# Patient Record
Sex: MALE | Race: WHITE | Employment: FULL TIME | URBAN - METROPOLITAN AREA
[De-identification: names, ages, dates, MRNs, and addresses within clinical notes are randomized per-mention and may not be internally consistent; named-entity substitution may affect disease eponyms.]

---

## 2017-03-17 ENCOUNTER — OFFICE VISIT (OUTPATIENT)
Dept: FAMILY MEDICINE CLINIC | Age: 42
End: 2017-03-17

## 2017-03-17 VITALS
DIASTOLIC BLOOD PRESSURE: 86 MMHG | TEMPERATURE: 98.3 F | WEIGHT: 253 LBS | HEART RATE: 85 BPM | BODY MASS INDEX: 39.63 KG/M2 | SYSTOLIC BLOOD PRESSURE: 124 MMHG | OXYGEN SATURATION: 98 %

## 2017-03-17 DIAGNOSIS — J02.9 SORE THROAT: Primary | ICD-10-CM

## 2017-03-17 LAB — S PYO AG THROAT QL: NORMAL

## 2017-03-17 PROCEDURE — 87880 STREP A ASSAY W/OPTIC: CPT | Performed by: FAMILY MEDICINE

## 2017-03-17 PROCEDURE — 99213 OFFICE O/P EST LOW 20 MIN: CPT | Performed by: FAMILY MEDICINE

## 2017-04-18 ENCOUNTER — TELEPHONE (OUTPATIENT)
Dept: FAMILY MEDICINE CLINIC | Age: 42
End: 2017-04-18

## 2017-04-18 RX ORDER — OMEPRAZOLE 40 MG/1
CAPSULE, DELAYED RELEASE ORAL
Qty: 90 CAPSULE | Refills: 3 | Status: SHIPPED | OUTPATIENT
Start: 2017-04-18 | End: 2017-07-10 | Stop reason: SDUPTHER

## 2017-05-09 ENCOUNTER — OFFICE VISIT (OUTPATIENT)
Dept: SURGERY | Age: 42
End: 2017-05-09

## 2017-05-09 VITALS — HEIGHT: 68 IN | DIASTOLIC BLOOD PRESSURE: 80 MMHG | SYSTOLIC BLOOD PRESSURE: 120 MMHG

## 2017-05-09 DIAGNOSIS — N50.89 SCROTAL SWELLING: Primary | ICD-10-CM

## 2017-05-09 PROCEDURE — 99203 OFFICE O/P NEW LOW 30 MIN: CPT | Performed by: SURGERY

## 2017-05-09 ASSESSMENT — ENCOUNTER SYMPTOMS
EYE ITCHING: 0
ABDOMINAL PAIN: 0
SINUS PRESSURE: 1
BACK PAIN: 1
ABDOMINAL DISTENTION: 0
EYE DISCHARGE: 0
COUGH: 1
COLOR CHANGE: 0

## 2017-05-13 PROBLEM — N45.1 EPIDIDYMITIS WITHOUT ABSCESS: Status: ACTIVE | Noted: 2017-05-13

## 2017-07-10 ENCOUNTER — OFFICE VISIT (OUTPATIENT)
Dept: FAMILY MEDICINE CLINIC | Age: 42
End: 2017-07-10

## 2017-07-10 VITALS
DIASTOLIC BLOOD PRESSURE: 84 MMHG | OXYGEN SATURATION: 97 % | HEART RATE: 72 BPM | WEIGHT: 260 LBS | SYSTOLIC BLOOD PRESSURE: 128 MMHG | BODY MASS INDEX: 39.53 KG/M2

## 2017-07-10 DIAGNOSIS — G40.209 PARTIAL EPILEPSY WITH IMPAIRMENT OF CONSCIOUSNESS (HCC): ICD-10-CM

## 2017-07-10 DIAGNOSIS — K21.9 GASTROESOPHAGEAL REFLUX DISEASE, ESOPHAGITIS PRESENCE NOT SPECIFIED: ICD-10-CM

## 2017-07-10 DIAGNOSIS — M19.072 PRIMARY OSTEOARTHRITIS OF LEFT ANKLE: ICD-10-CM

## 2017-07-10 DIAGNOSIS — I10 ESSENTIAL HYPERTENSION: Primary | ICD-10-CM

## 2017-07-10 PROBLEM — N45.1 EPIDIDYMITIS WITHOUT ABSCESS: Status: RESOLVED | Noted: 2017-05-13 | Resolved: 2017-07-10

## 2017-07-10 PROCEDURE — 99214 OFFICE O/P EST MOD 30 MIN: CPT | Performed by: FAMILY MEDICINE

## 2017-07-10 RX ORDER — OMEPRAZOLE 40 MG/1
CAPSULE, DELAYED RELEASE ORAL
Qty: 90 CAPSULE | Refills: 3 | Status: SHIPPED | OUTPATIENT
Start: 2017-07-10 | End: 2018-06-27 | Stop reason: SDUPTHER

## 2017-07-10 RX ORDER — LISINOPRIL AND HYDROCHLOROTHIAZIDE 20; 12.5 MG/1; MG/1
1 TABLET ORAL DAILY
Qty: 90 TABLET | Refills: 3 | Status: SHIPPED | OUTPATIENT
Start: 2017-07-10 | End: 2018-06-27 | Stop reason: SDUPTHER

## 2017-07-10 RX ORDER — MELOXICAM 15 MG/1
TABLET ORAL
Qty: 90 TABLET | Refills: 3 | Status: SHIPPED | OUTPATIENT
Start: 2017-07-10 | End: 2018-06-27 | Stop reason: SDUPTHER

## 2017-07-10 RX ORDER — LEVETIRACETAM 1000 MG/1
TABLET ORAL
Qty: 180 TABLET | Refills: 3 | Status: SHIPPED | OUTPATIENT
Start: 2017-07-10 | End: 2018-06-27 | Stop reason: SDUPTHER

## 2017-07-10 RX ORDER — AMLODIPINE BESYLATE 5 MG/1
TABLET ORAL
Qty: 90 TABLET | Refills: 3 | Status: SHIPPED | OUTPATIENT
Start: 2017-07-10 | End: 2018-06-27 | Stop reason: SDUPTHER

## 2017-07-10 RX ORDER — ALBUTEROL SULFATE 90 UG/1
2 AEROSOL, METERED RESPIRATORY (INHALATION) EVERY 6 HOURS PRN
Qty: 3 INHALER | Refills: 3 | Status: SHIPPED | OUTPATIENT
Start: 2017-07-10 | End: 2018-07-18 | Stop reason: SDUPTHER

## 2017-08-22 ENCOUNTER — TELEPHONE (OUTPATIENT)
Dept: ORTHOPEDIC SURGERY | Age: 42
End: 2017-08-22

## 2017-08-23 ENCOUNTER — ORTHOTIC/BRACE ENCOUNTER (OUTPATIENT)
Dept: ORTHOPEDIC SURGERY | Age: 42
End: 2017-08-23

## 2017-09-15 ENCOUNTER — OFFICE VISIT (OUTPATIENT)
Dept: ORTHOPEDIC SURGERY | Age: 42
End: 2017-09-15

## 2017-09-15 VITALS
DIASTOLIC BLOOD PRESSURE: 79 MMHG | SYSTOLIC BLOOD PRESSURE: 128 MMHG | WEIGHT: 259.92 LBS | HEIGHT: 68 IN | HEART RATE: 70 BPM | BODY MASS INDEX: 39.39 KG/M2

## 2017-09-15 DIAGNOSIS — M19.072 PRIMARY OSTEOARTHRITIS OF LEFT ANKLE: ICD-10-CM

## 2017-09-15 DIAGNOSIS — M79.671 PAIN IN BOTH FEET: Primary | ICD-10-CM

## 2017-09-15 DIAGNOSIS — M79.672 PAIN IN BOTH FEET: Primary | ICD-10-CM

## 2017-09-15 PROCEDURE — 73630 X-RAY EXAM OF FOOT: CPT | Performed by: ORTHOPAEDIC SURGERY

## 2017-09-15 PROCEDURE — 99243 OFF/OP CNSLTJ NEW/EST LOW 30: CPT | Performed by: ORTHOPAEDIC SURGERY

## 2017-09-20 ENCOUNTER — ORTHOTIC/BRACE ENCOUNTER (OUTPATIENT)
Dept: ORTHOPEDIC SURGERY | Age: 42
End: 2017-09-20

## 2017-09-20 DIAGNOSIS — M76.829 POSTERIOR TIBIALIS MUSCLE DYSFUNCTION: Primary | ICD-10-CM

## 2017-09-20 PROCEDURE — L3020 FOOT LONGITUD/METATARSAL SUP: HCPCS | Performed by: PEDORTHIST

## 2018-03-23 ENCOUNTER — OFFICE VISIT (OUTPATIENT)
Dept: FAMILY MEDICINE CLINIC | Age: 43
End: 2018-03-23

## 2018-03-23 VITALS
WEIGHT: 258.38 LBS | DIASTOLIC BLOOD PRESSURE: 78 MMHG | HEART RATE: 76 BPM | SYSTOLIC BLOOD PRESSURE: 132 MMHG | OXYGEN SATURATION: 98 % | TEMPERATURE: 98.4 F | BODY MASS INDEX: 39.16 KG/M2 | RESPIRATION RATE: 20 BRPM

## 2018-03-23 DIAGNOSIS — J01.90 ACUTE BACTERIAL SINUSITIS: Primary | ICD-10-CM

## 2018-03-23 DIAGNOSIS — B96.89 ACUTE BACTERIAL SINUSITIS: Primary | ICD-10-CM

## 2018-03-23 PROCEDURE — 99213 OFFICE O/P EST LOW 20 MIN: CPT | Performed by: FAMILY MEDICINE

## 2018-03-23 RX ORDER — CEFDINIR 300 MG/1
300 CAPSULE ORAL 2 TIMES DAILY
Qty: 20 CAPSULE | Refills: 0 | Status: SHIPPED | OUTPATIENT
Start: 2018-03-23 | End: 2018-04-02

## 2018-03-23 ASSESSMENT — ENCOUNTER SYMPTOMS
SINUS PRESSURE: 1
HOARSE VOICE: 1
SORE THROAT: 1
COUGH: 1

## 2018-03-23 NOTE — PROGRESS NOTES
Subjective:      Patient ID: Netty Fleischer is a 37 y.o. male. Patient presents for acute medical problem. Medical assistant notes reviewed. Sinusitis   This is a new problem. Episode onset: Tuesday. The problem has been gradually worsening since onset. There has been no fever. His pain is at a severity of 5/10. The pain is moderate. Associated symptoms include congestion, coughing, headaches, a hoarse voice, sinus pressure and a sore throat. (Wheezing ) Treatments tried: tylenol, mucinex. The treatment provided no relief. Patient started having purulent nasal drainage. He's also concerned that he is going out of town next several days. Review of Systems   HENT: Positive for congestion, hoarse voice, sinus pressure and sore throat. Respiratory: Positive for cough. Neurological: Positive for headaches. Allergies   Allergen Reactions    Penicillins     Phenobarbital Sodium        Objective:   Physical Exam   Constitutional: He appears well-developed and well-nourished. He is cooperative. No distress. HENT:   Right Ear: Tympanic membrane and ear canal normal.   Left Ear: Tympanic membrane and ear canal normal.   Nose: Mucosal edema and rhinorrhea (purulent) present. Right sinus exhibits frontal sinus tenderness. Right sinus exhibits no maxillary sinus tenderness. Left sinus exhibits frontal sinus tenderness. Left sinus exhibits no maxillary sinus tenderness. Mouth/Throat: Oropharynx is clear and moist and mucous membranes are normal.   Pulmonary/Chest: Effort normal and breath sounds normal.   Lymphadenopathy:     He has no cervical adenopathy. Neurological: He is alert. Assessment:      Destini Pinto was seen today for sinusitis. Diagnoses and all orders for this visit:    Acute bacterial sinusitis    Other orders  -     cefdinir (OMNICEF) 300 MG capsule; Take 1 capsule by mouth 2 times daily for 10 days            Plan:      Humidification of the bedroom was discussed.   Maintain

## 2018-06-14 ENCOUNTER — HOSPITAL ENCOUNTER (OUTPATIENT)
Dept: SURGERY | Age: 43
Discharge: HOME | End: 2018-06-14
Attending: PODIATRIST | Admitting: PODIATRIST

## 2018-06-14 VITALS
OXYGEN SATURATION: 100 % | BODY MASS INDEX: 39.25 KG/M2 | RESPIRATION RATE: 16 BRPM | DIASTOLIC BLOOD PRESSURE: 68 MMHG | SYSTOLIC BLOOD PRESSURE: 112 MMHG | TEMPERATURE: 98.5 F | HEART RATE: 73 BPM | HEIGHT: 68 IN | WEIGHT: 259 LBS

## 2018-06-14 DIAGNOSIS — M24.571 ANKLE JOINT CONTRACTURE, RIGHT: Primary | ICD-10-CM

## 2018-06-14 LAB
ANION GAP SERPL CALCULATED.3IONS-SCNC: 12 MMOL/L (ref 3–16)
BUN BLDV-MCNC: 19 MG/DL (ref 7–20)
CALCIUM SERPL-MCNC: 9.5 MG/DL (ref 8.3–10.6)
CHLORIDE BLD-SCNC: 101 MMOL/L (ref 99–110)
CO2: 27 MMOL/L (ref 21–32)
CREAT SERPL-MCNC: 0.8 MG/DL (ref 0.9–1.3)
GFR AFRICAN AMERICAN: >60
GFR NON-AFRICAN AMERICAN: >60
GLUCOSE BLD-MCNC: 99 MG/DL (ref 70–99)
HCT VFR BLD CALC: 46.8 % (ref 40.5–52.5)
HEMOGLOBIN: 15.7 G/DL (ref 13.5–17.5)
MCH RBC QN AUTO: 28.4 PG (ref 26–34)
MCHC RBC AUTO-ENTMCNC: 33.6 G/DL (ref 31–36)
MCV RBC AUTO: 84.6 FL (ref 80–100)
PDW BLD-RTO: 13.6 % (ref 12.4–15.4)
PLATELET # BLD: 209 K/UL (ref 135–450)
PMV BLD AUTO: 7.8 FL (ref 5–10.5)
POTASSIUM SERPL-SCNC: 3.9 MMOL/L (ref 3.5–5.1)
RBC # BLD: 5.53 M/UL (ref 4.2–5.9)
SODIUM BLD-SCNC: 140 MMOL/L (ref 136–145)
WBC # BLD: 6.6 K/UL (ref 4–11)

## 2018-06-14 RX ORDER — HYDROCODONE BITARTRATE AND ACETAMINOPHEN 5; 325 MG/1; MG/1
1 TABLET ORAL EVERY 6 HOURS PRN
Status: DISCONTINUED | OUTPATIENT
Start: 2018-06-14 | End: 2018-06-15 | Stop reason: HOSPADM

## 2018-06-14 RX ORDER — LIDOCAINE HYDROCHLORIDE 10 MG/ML
0.5 INJECTION, SOLUTION EPIDURAL; INFILTRATION; INTRACAUDAL; PERINEURAL ONCE
Status: DISCONTINUED | OUTPATIENT
Start: 2018-06-14 | End: 2018-06-15 | Stop reason: HOSPADM

## 2018-06-14 RX ORDER — HYDROCODONE BITARTRATE AND ACETAMINOPHEN 5; 325 MG/1; MG/1
1-2 TABLET ORAL EVERY 6 HOURS PRN
Qty: 30 TABLET | Refills: 0 | Status: SHIPPED | OUTPATIENT
Start: 2018-06-14 | End: 2018-06-17

## 2018-06-14 RX ORDER — SODIUM CHLORIDE, SODIUM LACTATE, POTASSIUM CHLORIDE, CALCIUM CHLORIDE 600; 310; 30; 20 MG/100ML; MG/100ML; MG/100ML; MG/100ML
INJECTION, SOLUTION INTRAVENOUS CONTINUOUS
Status: DISCONTINUED | OUTPATIENT
Start: 2018-06-14 | End: 2018-06-15 | Stop reason: HOSPADM

## 2018-06-14 RX ORDER — MIDAZOLAM HYDROCHLORIDE 1 MG/ML
1 INJECTION INTRAMUSCULAR; INTRAVENOUS ONCE
Status: COMPLETED | OUTPATIENT
Start: 2018-06-14 | End: 2018-06-14

## 2018-06-14 RX ORDER — SCOLOPAMINE TRANSDERMAL SYSTEM 1 MG/1
1 PATCH, EXTENDED RELEASE TRANSDERMAL ONCE
Status: DISCONTINUED | OUTPATIENT
Start: 2018-06-14 | End: 2018-06-15 | Stop reason: HOSPADM

## 2018-06-14 RX ORDER — FENTANYL CITRATE 50 UG/ML
50 INJECTION, SOLUTION INTRAMUSCULAR; INTRAVENOUS ONCE
Status: COMPLETED | OUTPATIENT
Start: 2018-06-14 | End: 2018-06-14

## 2018-06-14 RX ORDER — CEFAZOLIN SODIUM 2 G/100ML
2 INJECTION, SOLUTION INTRAVENOUS
Status: COMPLETED | OUTPATIENT
Start: 2018-06-14 | End: 2018-06-14

## 2018-06-14 RX ADMIN — MIDAZOLAM HYDROCHLORIDE 1 MG: 1 INJECTION INTRAMUSCULAR; INTRAVENOUS at 12:40

## 2018-06-14 RX ADMIN — MIDAZOLAM HYDROCHLORIDE 1 MG: 1 INJECTION INTRAMUSCULAR; INTRAVENOUS at 12:35

## 2018-06-14 RX ADMIN — HYDROCODONE BITARTRATE AND ACETAMINOPHEN 1 TABLET: 5; 325 TABLET ORAL at 14:46

## 2018-06-14 RX ADMIN — CEFAZOLIN SODIUM 2 G: 2 INJECTION, SOLUTION INTRAVENOUS at 13:29

## 2018-06-14 RX ADMIN — SODIUM CHLORIDE, SODIUM LACTATE, POTASSIUM CHLORIDE, CALCIUM CHLORIDE: 600; 310; 30; 20 INJECTION, SOLUTION INTRAVENOUS at 12:32

## 2018-06-14 RX ADMIN — FENTANYL CITRATE 50 MCG: 50 INJECTION, SOLUTION INTRAMUSCULAR; INTRAVENOUS at 12:36

## 2018-06-14 ASSESSMENT — PAIN SCALES - GENERAL
PAINLEVEL_OUTOF10: 4
PAINLEVEL_OUTOF10: 0

## 2018-06-14 ASSESSMENT — PAIN - FUNCTIONAL ASSESSMENT: PAIN_FUNCTIONAL_ASSESSMENT: 0-10

## 2018-06-15 LAB
EKG ATRIAL RATE: 69 BPM
EKG DIAGNOSIS: NORMAL
EKG P AXIS: 3 DEGREES
EKG P-R INTERVAL: 190 MS
EKG Q-T INTERVAL: 408 MS
EKG QRS DURATION: 96 MS
EKG QTC CALCULATION (BAZETT): 437 MS
EKG R AXIS: 23 DEGREES
EKG T AXIS: 16 DEGREES
EKG VENTRICULAR RATE: 69 BPM

## 2018-06-15 PROCEDURE — 93010 ELECTROCARDIOGRAM REPORT: CPT | Performed by: INTERNAL MEDICINE

## 2018-06-27 DIAGNOSIS — G40.209 PARTIAL EPILEPSY WITH IMPAIRMENT OF CONSCIOUSNESS (HCC): ICD-10-CM

## 2018-06-27 RX ORDER — MELOXICAM 15 MG/1
TABLET ORAL
Qty: 90 TABLET | Refills: 0 | Status: SHIPPED | OUTPATIENT
Start: 2018-06-27 | End: 2018-07-18 | Stop reason: SDUPTHER

## 2018-06-27 RX ORDER — LEVETIRACETAM 1000 MG/1
TABLET ORAL
Qty: 180 TABLET | Refills: 0 | Status: SHIPPED | OUTPATIENT
Start: 2018-06-27 | End: 2018-07-18 | Stop reason: SDUPTHER

## 2018-06-27 RX ORDER — AMLODIPINE BESYLATE 5 MG/1
TABLET ORAL
Qty: 90 TABLET | Refills: 0 | Status: SHIPPED | OUTPATIENT
Start: 2018-06-27 | End: 2018-07-18 | Stop reason: SDUPTHER

## 2018-06-27 RX ORDER — LISINOPRIL AND HYDROCHLOROTHIAZIDE 20; 12.5 MG/1; MG/1
1 TABLET ORAL DAILY
Qty: 90 TABLET | Refills: 0 | Status: SHIPPED | OUTPATIENT
Start: 2018-06-27 | End: 2018-07-18

## 2018-06-27 RX ORDER — OMEPRAZOLE 40 MG/1
CAPSULE, DELAYED RELEASE ORAL
Qty: 90 CAPSULE | Refills: 0 | Status: SHIPPED | OUTPATIENT
Start: 2018-06-27 | End: 2018-07-18

## 2018-06-28 ENCOUNTER — TELEPHONE (OUTPATIENT)
Dept: FAMILY MEDICINE CLINIC | Age: 43
End: 2018-06-28

## 2018-07-05 ENCOUNTER — TELEPHONE (OUTPATIENT)
Dept: FAMILY MEDICINE CLINIC | Age: 43
End: 2018-07-05

## 2018-07-05 DIAGNOSIS — G40.209 PARTIAL EPILEPSY WITH IMPAIRMENT OF CONSCIOUSNESS (HCC): ICD-10-CM

## 2018-07-05 RX ORDER — AMLODIPINE BESYLATE 5 MG/1
TABLET ORAL
Qty: 90 TABLET | Refills: 0 | Status: SHIPPED | OUTPATIENT
Start: 2018-07-05 | End: 2018-07-18

## 2018-07-05 RX ORDER — LEVETIRACETAM 1000 MG/1
TABLET ORAL
Qty: 180 TABLET | Refills: 0 | Status: SHIPPED | OUTPATIENT
Start: 2018-07-05 | End: 2018-07-18

## 2018-07-05 RX ORDER — LISINOPRIL AND HYDROCHLOROTHIAZIDE 20; 12.5 MG/1; MG/1
1 TABLET ORAL DAILY
Qty: 90 TABLET | Refills: 0 | Status: SHIPPED | OUTPATIENT
Start: 2018-07-05 | End: 2018-07-18 | Stop reason: SDUPTHER

## 2018-07-05 RX ORDER — MELOXICAM 15 MG/1
TABLET ORAL
Qty: 90 TABLET | Refills: 0 | Status: SHIPPED | OUTPATIENT
Start: 2018-07-05 | End: 2018-07-18

## 2018-07-05 RX ORDER — OMEPRAZOLE 40 MG/1
CAPSULE, DELAYED RELEASE ORAL
Qty: 90 CAPSULE | Refills: 0 | Status: SHIPPED | OUTPATIENT
Start: 2018-07-05 | End: 2018-07-18 | Stop reason: SDUPTHER

## 2018-07-05 NOTE — TELEPHONE ENCOUNTER
Rx's have been cancelled at [facility] pharmacy and sent to [facility].

## 2018-07-05 NOTE — TELEPHONE ENCOUNTER
Pt has an appt scheduled for 09/11. His medications 90 day mail order prescriptions.   He is requesting a refill for the below meds:      levETIRAcetam (KEPPRA) 1000 MG tablet    omeprazole (PRILOSEC) 40 MG delayed release capsule

## 2018-07-17 ENCOUNTER — TELEPHONE (OUTPATIENT)
Dept: NEUROLOGY | Age: 43
End: 2018-07-17

## 2018-07-18 ENCOUNTER — OFFICE VISIT (OUTPATIENT)
Dept: FAMILY MEDICINE CLINIC | Age: 43
End: 2018-07-18

## 2018-07-18 VITALS
OXYGEN SATURATION: 97 % | WEIGHT: 234 LBS | BODY MASS INDEX: 35.58 KG/M2 | HEART RATE: 70 BPM | DIASTOLIC BLOOD PRESSURE: 84 MMHG | SYSTOLIC BLOOD PRESSURE: 120 MMHG

## 2018-07-18 DIAGNOSIS — Z82.49 FAMILY HISTORY OF ISCHEMIC HEART DISEASE: ICD-10-CM

## 2018-07-18 DIAGNOSIS — Q03.9 CONGENITAL HYDROCEPHALUS (HCC): ICD-10-CM

## 2018-07-18 DIAGNOSIS — K21.9 GASTROESOPHAGEAL REFLUX DISEASE, ESOPHAGITIS PRESENCE NOT SPECIFIED: ICD-10-CM

## 2018-07-18 DIAGNOSIS — I10 ESSENTIAL HYPERTENSION: ICD-10-CM

## 2018-07-18 DIAGNOSIS — G40.209 PARTIAL EPILEPSY WITH IMPAIRMENT OF CONSCIOUSNESS (HCC): Primary | ICD-10-CM

## 2018-07-18 PROCEDURE — 90732 PPSV23 VACC 2 YRS+ SUBQ/IM: CPT | Performed by: FAMILY MEDICINE

## 2018-07-18 PROCEDURE — 99214 OFFICE O/P EST MOD 30 MIN: CPT | Performed by: FAMILY MEDICINE

## 2018-07-18 PROCEDURE — 90471 IMMUNIZATION ADMIN: CPT | Performed by: FAMILY MEDICINE

## 2018-07-18 RX ORDER — ALBUTEROL SULFATE 90 UG/1
2 AEROSOL, METERED RESPIRATORY (INHALATION) EVERY 6 HOURS PRN
Qty: 3 INHALER | Refills: 3 | Status: SHIPPED | OUTPATIENT
Start: 2018-07-18 | End: 2019-07-18 | Stop reason: SDUPTHER

## 2018-07-18 RX ORDER — LISINOPRIL AND HYDROCHLOROTHIAZIDE 20; 12.5 MG/1; MG/1
1 TABLET ORAL DAILY
Qty: 90 TABLET | Refills: 3 | Status: SHIPPED | OUTPATIENT
Start: 2018-07-18 | End: 2019-07-18 | Stop reason: SDUPTHER

## 2018-07-18 RX ORDER — OMEPRAZOLE 40 MG/1
CAPSULE, DELAYED RELEASE ORAL
Qty: 90 CAPSULE | Refills: 3 | Status: SHIPPED | OUTPATIENT
Start: 2018-07-18 | End: 2019-07-18 | Stop reason: SDUPTHER

## 2018-07-18 RX ORDER — LEVETIRACETAM 1000 MG/1
TABLET ORAL
Qty: 180 TABLET | Refills: 3 | Status: SHIPPED | OUTPATIENT
Start: 2018-07-18 | End: 2019-07-18 | Stop reason: SDUPTHER

## 2018-07-18 RX ORDER — MELOXICAM 15 MG/1
TABLET ORAL
Qty: 90 TABLET | Refills: 3 | Status: SHIPPED | OUTPATIENT
Start: 2018-07-18 | End: 2019-07-18 | Stop reason: SDUPTHER

## 2018-07-18 RX ORDER — AMLODIPINE BESYLATE 5 MG/1
TABLET ORAL
Qty: 90 TABLET | Refills: 3 | Status: SHIPPED | OUTPATIENT
Start: 2018-07-18 | End: 2019-07-18 | Stop reason: SDUPTHER

## 2018-07-18 ASSESSMENT — PATIENT HEALTH QUESTIONNAIRE - PHQ9
SUM OF ALL RESPONSES TO PHQ9 QUESTIONS 1 & 2: 0
2. FEELING DOWN, DEPRESSED OR HOPELESS: 0
SUM OF ALL RESPONSES TO PHQ QUESTIONS 1-9: 0
1. LITTLE INTEREST OR PLEASURE IN DOING THINGS: 0

## 2018-07-18 ASSESSMENT — ENCOUNTER SYMPTOMS
ABDOMINAL PAIN: 0
HEARTBURN: 0
COUGH: 0
SHORTNESS OF BREATH: 0
CONSTIPATION: 0
BACK PAIN: 0
BLURRED VISION: 0

## 2018-07-18 NOTE — PROGRESS NOTES
3. 9    CREATININE (mg/dL)   Date Value   2018 0.8 (L)        Patient is a family history of heart disease in his father who  in his 35s of a heart attack. His lipids have been normal in the past.  Current Outpatient Prescriptions on File Prior to Visit   Medication Sig Dispense Refill    cetirizine (ZYRTEC) 10 MG tablet Take 10 mg by mouth daily      melatonin 3 MG TABS tablet Take 3 mg by mouth 2 QHS       No current facility-administered medications on file prior to visit. Review of Systems   Constitutional: Negative for malaise/fatigue. HENT: Negative for hearing loss. Eyes: Negative for blurred vision. Respiratory: Negative for cough and shortness of breath. Cardiovascular: Negative for chest pain and leg swelling. Gastrointestinal: Negative for abdominal pain, constipation and heartburn. Musculoskeletal: Positive for joint pain. Negative for back pain and myalgias. Skin: Negative for rash. Neurological: Positive for tingling. Negative for sensory change, focal weakness and weakness. Endo/Heme/Allergies: Negative for polydipsia. Psychiatric/Behavioral: The patient does not have insomnia. Physical Exam   Constitutional: He is oriented to person, place, and time. He appears well-developed and well-nourished. He is cooperative. Obese   HENT:   Nose: No mucosal edema or rhinorrhea. Right sinus exhibits no maxillary sinus tenderness and no frontal sinus tenderness. Left sinus exhibits no maxillary sinus tenderness and no frontal sinus tenderness. Mouth/Throat: Oropharynx is clear and moist and mucous membranes are normal.   Eyes: EOM are normal. Pupils are equal, round, and reactive to light. Neck: Neck supple. Cardiovascular: Normal rate, regular rhythm, normal heart sounds and intact distal pulses. Pulmonary/Chest: Effort normal and breath sounds normal. No respiratory distress.    Musculoskeletal:   Deformity of foot, smaller calf left side

## 2018-07-19 ENCOUNTER — HOSPITAL ENCOUNTER (OUTPATIENT)
Dept: OTHER | Age: 43
Discharge: HOME | End: 2018-07-19
Attending: FAMILY MEDICINE | Admitting: FAMILY MEDICINE

## 2018-07-19 DIAGNOSIS — Z82.49 FAMILY HISTORY OF ISCHEMIC HEART DISEASE: ICD-10-CM

## 2018-07-19 DIAGNOSIS — K21.9 GASTROESOPHAGEAL REFLUX DISEASE, ESOPHAGITIS PRESENCE NOT SPECIFIED: ICD-10-CM

## 2018-07-19 DIAGNOSIS — G40.209 PARTIAL EPILEPSY WITH IMPAIRMENT OF CONSCIOUSNESS (HCC): ICD-10-CM

## 2018-07-19 LAB
ALBUMIN SERPL-MCNC: 4.1 G/DL (ref 3.4–5)
ALP BLD-CCNC: 56 U/L (ref 40–129)
ALT SERPL-CCNC: 22 U/L (ref 10–40)
AST SERPL-CCNC: 13 U/L (ref 15–37)
BILIRUB SERPL-MCNC: 0.8 MG/DL (ref 0–1)
BILIRUBIN DIRECT: <0.2 MG/DL (ref 0–0.3)
BILIRUBIN, INDIRECT: ABNORMAL MG/DL (ref 0–1)
CHOLESTEROL, TOTAL: 104 MG/DL (ref 0–199)
HDLC SERPL-MCNC: 29 MG/DL (ref 40–60)
KEPPRA DOSE AMT: NORMAL
KEPPRA: 36.6 UG/ML (ref 6–46)
LDL CHOLESTEROL CALCULATED: 55 MG/DL
MAGNESIUM: 2 MG/DL (ref 1.8–2.4)
TOTAL PROTEIN: 6.7 G/DL (ref 6.4–8.2)
TRIGL SERPL-MCNC: 101 MG/DL (ref 0–150)
VLDLC SERPL CALC-MCNC: 20 MG/DL

## 2018-08-14 ENCOUNTER — OFFICE VISIT (OUTPATIENT)
Dept: NEUROLOGY | Age: 43
End: 2018-08-14

## 2018-08-14 VITALS
DIASTOLIC BLOOD PRESSURE: 79 MMHG | HEIGHT: 68 IN | BODY MASS INDEX: 39.56 KG/M2 | WEIGHT: 261 LBS | SYSTOLIC BLOOD PRESSURE: 121 MMHG | HEART RATE: 82 BPM

## 2018-08-14 DIAGNOSIS — G40.209 PARTIAL EPILEPSY WITH IMPAIRMENT OF CONSCIOUSNESS (HCC): Primary | ICD-10-CM

## 2018-08-14 DIAGNOSIS — G40.919 BREAKTHROUGH SEIZURE (HCC): ICD-10-CM

## 2018-08-14 PROCEDURE — 99244 OFF/OP CNSLTJ NEW/EST MOD 40: CPT | Performed by: PSYCHIATRY & NEUROLOGY

## 2018-08-14 NOTE — PROGRESS NOTES
NEUROLOGY CONSULTATION     Chief Complaint   Patient presents with    Seizures     Patient is here today because he had a recent seizure after 9 years. HISTORY OF PRESENT ILLNESS :    Kenneth mAado is a 37 y.o. male who is referred by Dr. Julieta Schaffer   History was obtained from patient And his wife. Patient has a known partial complex seizure disorder. Patient also has known congenital hydrocephalus. I had seen this patient a little over 3 years ago. Patient now follow-up with his primary care physician for medication refills. Patient had been doing fairly well on Keppra 1000 mg twice daily. A few weeks ago patient was with some friends and his wife and was having a couple of years. After that he had a spell in which she was startedand became less responsive. This lasted for a minute or 2. After the patient was somewhat foggy and had difficulty with concentrating for the next 2-3 days. There was no tonic-clonic seizure activity noted. There was no bladder or bowel incontinence. There was no tongue or lip biting. Patient had been taking his medications regularly. Since that time patient has had a Keppra level which was therapeutic. Patient states that he had not eaten much that day and for the previous 2 nights he had not slept well maybe not more than a few hours each day. There has been some increased stress at work.       REVIEW OF SYSTEMS    Constitutional:  []   Chills   []  Fatigue   []  Fevers   []  Malaise   []  Weight loss     [x] Denies all of the above    Eyes:  []  Double vision   []  Blurry vision     [x] Denies all of the above    Ears, nose, mouth, throat, and face:   [] Hearing loss    []   Hoarseness      []  Snoring    []  Tinnitus       [x] Denies all of the above     Respiratory:   []  Cough    []  Shortness of breath         [x] Denies all of the above     Cardiovascular:   []  Chest pain    []

## 2018-08-14 NOTE — LETTER
[facility]  [address] [address]  Phone: [phone]  Fax: [phone]    Dylon Del Rosario MD        August 14, 2018       Patient: Sahra Washington   MR Number: [id number]   YOB: 1975   Date of Visit: 8/14/2018       Dear Dr. Abelardo Farris: Thank you for the request for consultation for Ada Chiang to me for the evaluation of Breakthrough seizure. Below are the relevant portions of my assessment and plan of care. NEUROLOGY CONSULTATION     Chief Complaint   Patient presents with    Seizures     Patient is here today because he had a recent seizure after 9 years. HISTORY OF PRESENT ILLNESS :    Ada Chiang is a 37 y.o. male who is referred by Dr. Abelardo Farris   History was obtained from patient And his wife. Patient has a known partial complex seizure disorder. Patient also has known congenital hydrocephalus. I had seen this patient a little over 3 years ago. Patient now follow-up with his primary care physician for medication refills. Patient had been doing fairly well on Keppra 1000 mg twice daily. A few weeks ago patient was with some friends and his wife and was having a couple of years. After that he had a spell in which she was startedand became less responsive. This lasted for a minute or 2. After the patient was somewhat foggy and had difficulty with concentrating for the next 2-3 days. There was no tonic-clonic seizure activity noted. There was no bladder or bowel incontinence. There was no tongue or lip biting. Patient had been taking his medications regularly. Since that time patient has had a Keppra level which was therapeutic. Patient states that he had not eaten much that day and for the previous 2 nights he had not slept well maybe not more than a few hours each day. There has been some increased stress at work.       REVIEW OF SYSTEMS  PONV (postoperative nausea and vomiting)     Seizure ([address]     started as infant, then none x 30 yrs, none since 2009    Seizures ([address] 06/05/2018    LAST SEIZURE     Sinusitis, acute     Upper respiratory infection      Family History   Problem Relation Age of Onset    Rheum Arthritis Mother     High Blood Pressure Mother     Cancer Maternal Grandmother         lung    Other Maternal Grandfather         parkinsons disease    Heart Disease Father      Social History     Social History    Marital status:      Spouse name: N/A    Number of children: N/A    Years of education: N/A     Social History Main Topics    Smoking status: Former Smoker     Quit date: 5/10/1996    Smokeless tobacco: Never Used    Alcohol use 3.0 oz/week     5 Cans of beer per week      Comment: 5 BEERS WEEKLY    Drug use: No    Sexual activity: Not Asked     Other Topics Concern    None     Social History Narrative    None       PHYSICAL EXAMINATION:  /79   Pulse 82   Ht 5' 8\" (1.727 m)   Wt 261 lb (118.4 kg)   BMI 39.68 kg/m²    Appearance: Well appearing, well nourished and in no distress  Mental Status Exam: Patient is alert, oriented to person, place and time. Recent and remote memory is normal  Fund of Knowledge is normal  Attention/concentration is normal.   Speech : No dysarthria  Language : No aphasia  Funduscopic Exam: sharp disc margins  Cranial Nerves:   II: Visual fields:  Full to confrontation  III: Pupils:  equal, round, reactive to light  III,IV,VI: Extra Ocular Movements are intact.  No nystagmus  V: Facial sensation is intact to pin prick and light touch  VII: Facial strength and movements: intact and symmetric smile,cheek puffing and eyebrow elevation  VIII: Hearing:  Intact to finger rub bilaterally  IX: Palate  elevation is symmetric  XI: Shoulder shrug is intact  XII: Tongue movements are normal  Motor:  Muscle tone and bulk are normal.

## 2018-08-14 NOTE — PATIENT INSTRUCTIONS
Please call with any questions or concerns:   [facility]  @ [phone]. LAB RESULTS:  Please obtain any labs or diagnostic tests as discussed today. You should call the office to check the results. Please allow  3 to 7 days for us to get these results. MEDICATION LIST:  Please bring an accurate list of your medications to every visit. APPOINTMENT CONFIRMATION:  We will call you the day before your scheduled appointment to confirm. If we are unable to reach you, you MUST call back by the end of the day to confirm the appointment or we may be forced to cancel.

## 2019-07-18 ENCOUNTER — OFFICE VISIT (OUTPATIENT)
Dept: FAMILY MEDICINE CLINIC | Age: 44
End: 2019-07-18
Payer: COMMERCIAL

## 2019-07-18 VITALS
OXYGEN SATURATION: 96 % | SYSTOLIC BLOOD PRESSURE: 100 MMHG | BODY MASS INDEX: 39.53 KG/M2 | HEART RATE: 68 BPM | DIASTOLIC BLOOD PRESSURE: 80 MMHG | WEIGHT: 260 LBS

## 2019-07-18 DIAGNOSIS — G40.209 PARTIAL EPILEPSY WITH IMPAIRMENT OF CONSCIOUSNESS (HCC): ICD-10-CM

## 2019-07-18 DIAGNOSIS — Z00.00 PREVENTATIVE HEALTH CARE: Primary | ICD-10-CM

## 2019-07-18 DIAGNOSIS — J30.2 SEASONAL ALLERGIC RHINITIS, UNSPECIFIED TRIGGER: ICD-10-CM

## 2019-07-18 DIAGNOSIS — K21.9 GASTROESOPHAGEAL REFLUX DISEASE, ESOPHAGITIS PRESENCE NOT SPECIFIED: ICD-10-CM

## 2019-07-18 DIAGNOSIS — F51.01 PRIMARY INSOMNIA: ICD-10-CM

## 2019-07-18 DIAGNOSIS — I10 ESSENTIAL HYPERTENSION: ICD-10-CM

## 2019-07-18 DIAGNOSIS — M19.072 PRIMARY OSTEOARTHRITIS OF LEFT ANKLE: ICD-10-CM

## 2019-07-18 DIAGNOSIS — Z23 NEED FOR VACCINATION FOR STREP PNEUMONIAE: ICD-10-CM

## 2019-07-18 DIAGNOSIS — J45.909 MILD ASTHMA WITHOUT COMPLICATION, UNSPECIFIED WHETHER PERSISTENT: ICD-10-CM

## 2019-07-18 PROCEDURE — 99396 PREV VISIT EST AGE 40-64: CPT | Performed by: PHYSICIAN ASSISTANT

## 2019-07-18 PROCEDURE — 90670 PCV13 VACCINE IM: CPT | Performed by: PHYSICIAN ASSISTANT

## 2019-07-18 PROCEDURE — 90471 IMMUNIZATION ADMIN: CPT | Performed by: PHYSICIAN ASSISTANT

## 2019-07-18 RX ORDER — AMLODIPINE BESYLATE 5 MG/1
TABLET ORAL
Qty: 90 TABLET | Refills: 3 | Status: SHIPPED | OUTPATIENT
Start: 2019-07-18 | End: 2020-10-26 | Stop reason: SDUPTHER

## 2019-07-18 RX ORDER — TRAZODONE HYDROCHLORIDE 50 MG/1
TABLET ORAL
Qty: 60 TABLET | Refills: 3 | Status: SHIPPED | OUTPATIENT
Start: 2019-07-18 | End: 2019-09-17 | Stop reason: SDUPTHER

## 2019-07-18 RX ORDER — MELOXICAM 15 MG/1
TABLET ORAL
Qty: 90 TABLET | Refills: 3 | Status: SHIPPED | OUTPATIENT
Start: 2019-07-18 | End: 2020-10-26 | Stop reason: SDUPTHER

## 2019-07-18 RX ORDER — LISINOPRIL AND HYDROCHLOROTHIAZIDE 20; 12.5 MG/1; MG/1
1 TABLET ORAL DAILY
Qty: 90 TABLET | Refills: 3 | Status: SHIPPED | OUTPATIENT
Start: 2019-07-18 | End: 2020-06-04

## 2019-07-18 RX ORDER — LEVETIRACETAM 1000 MG/1
TABLET ORAL
Qty: 180 TABLET | Refills: 3 | Status: SHIPPED | OUTPATIENT
Start: 2019-07-18 | End: 2020-10-26 | Stop reason: SDUPTHER

## 2019-07-18 RX ORDER — OMEPRAZOLE 40 MG/1
CAPSULE, DELAYED RELEASE ORAL
Qty: 90 CAPSULE | Refills: 3 | Status: SHIPPED | OUTPATIENT
Start: 2019-07-18 | End: 2020-10-26 | Stop reason: SDUPTHER

## 2019-07-18 RX ORDER — ALBUTEROL SULFATE 90 UG/1
2 AEROSOL, METERED RESPIRATORY (INHALATION) EVERY 6 HOURS PRN
Qty: 3 INHALER | Refills: 0 | Status: SHIPPED | OUTPATIENT
Start: 2019-07-18 | End: 2019-09-29 | Stop reason: SDUPTHER

## 2019-07-18 ASSESSMENT — ENCOUNTER SYMPTOMS
CHEST TIGHTNESS: 0
CONSTIPATION: 0
TROUBLE SWALLOWING: 0
BACK PAIN: 0
DIARRHEA: 0
SORE THROAT: 0
EYE PAIN: 0
COUGH: 0
VOICE CHANGE: 0
SHORTNESS OF BREATH: 0
ABDOMINAL PAIN: 0

## 2019-07-18 ASSESSMENT — PATIENT HEALTH QUESTIONNAIRE - PHQ9
SUM OF ALL RESPONSES TO PHQ QUESTIONS 1-9: 0
1. LITTLE INTEREST OR PLEASURE IN DOING THINGS: 0
SUM OF ALL RESPONSES TO PHQ9 QUESTIONS 1 & 2: 0
SUM OF ALL RESPONSES TO PHQ QUESTIONS 1-9: 0
2. FEELING DOWN, DEPRESSED OR HOPELESS: 0

## 2019-07-18 NOTE — PATIENT INSTRUCTIONS
Mabel Calzada was seen today for follow-up. 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)  -     levETIRAcetam (KEPPRA) 1000 MG tablet; TAKE 1 TABLET BY MOUTH 2 TIMES DAILY    Mild asthma without complication, unspecified whether persistent  -     albuterol sulfate HFA (VENTOLIN HFA) 108 (90 Base) MCG/ACT inhaler; Inhale 2 puffs into the lungs every 6 hours as needed for Wheezing    Essential hypertension  -     lisinopril-hydrochlorothiazide (PRINZIDE;ZESTORETIC) 20-12.5 MG per tablet; Take 1 tablet by mouth daily  -     amLODIPine (NORVASC) 5 MG tablet; TAKE 1 TABLET BY MOUTH DAILY    Seasonal allergic rhinitis, unspecified trigger    Primary osteoarthritis of left ankle  -     meloxicam (MOBIC) 15 MG tablet; TAKE 1 TABLET BY MOUTH DAILY    Primary insomnia  -     traZODone (DESYREL) 50 MG tablet; Take 1-2 tabs po qhs prn insomnia    Need for vaccination for Strep pneumoniae  -     PREVNAR 13 IM (Pneumococcal conjugate vaccine 13-valent)       Trazodone for sleep, prevnar 13 today, discussed better eating habits and more exercise, return in a year.

## 2019-07-18 NOTE — PROGRESS NOTES
Donaldo Ricci was seen today for follow-up. 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)  -     levETIRAcetam (KEPPRA) 1000 MG tablet; TAKE 1 TABLET BY MOUTH 2 TIMES DAILY    Mild asthma without complication, unspecified whether persistent  -     albuterol sulfate HFA (VENTOLIN HFA) 108 (90 Base) MCG/ACT inhaler; Inhale 2 puffs into the lungs every 6 hours as needed for Wheezing    Essential hypertension  -     lisinopril-hydrochlorothiazide (PRINZIDE;ZESTORETIC) 20-12.5 MG per tablet; Take 1 tablet by mouth daily  -     amLODIPine (NORVASC) 5 MG tablet; TAKE 1 TABLET BY MOUTH DAILY    Seasonal allergic rhinitis, unspecified trigger    Primary osteoarthritis of left ankle  -     meloxicam (MOBIC) 15 MG tablet; TAKE 1 TABLET BY MOUTH DAILY    Primary insomnia  -     traZODone (DESYREL) 50 MG tablet; Take 1-2 tabs po qhs prn insomnia    Need for vaccination for Strep pneumoniae  -     PREVNAR 13 IM (Pneumococcal conjugate vaccine 13-valent)             Plan:      Prevnar 13 today, try trazodone for sleep, refills given, reviewed recent labs that were normal, return in a year.          Ness Rao

## 2019-09-17 DIAGNOSIS — F51.01 PRIMARY INSOMNIA: ICD-10-CM

## 2019-09-17 RX ORDER — TRAZODONE HYDROCHLORIDE 50 MG/1
TABLET ORAL
Qty: 180 TABLET | Refills: 1 | Status: SHIPPED | OUTPATIENT
Start: 2019-09-17 | End: 2020-10-26 | Stop reason: SDUPTHER

## 2019-12-27 DIAGNOSIS — J45.909 MILD ASTHMA WITHOUT COMPLICATION, UNSPECIFIED WHETHER PERSISTENT: ICD-10-CM

## 2019-12-27 RX ORDER — ALBUTEROL SULFATE 90 UG/1
AEROSOL, METERED RESPIRATORY (INHALATION)
Qty: 3 INHALER | Refills: 0 | Status: SHIPPED | OUTPATIENT
Start: 2019-12-27 | End: 2020-10-26 | Stop reason: SDUPTHER

## 2020-03-26 RX ORDER — ALBUTEROL SULFATE 90 UG/1
AEROSOL, METERED RESPIRATORY (INHALATION)
Qty: 24 G | Refills: 3 | OUTPATIENT
Start: 2020-03-26

## 2020-03-27 RX ORDER — ALBUTEROL SULFATE 90 UG/1
AEROSOL, METERED RESPIRATORY (INHALATION)
Qty: 24 G | Refills: 3 | OUTPATIENT
Start: 2020-03-27

## 2020-06-04 RX ORDER — LISINOPRIL AND HYDROCHLOROTHIAZIDE 20; 12.5 MG/1; MG/1
TABLET ORAL
Qty: 90 TABLET | Refills: 0 | Status: SHIPPED | OUTPATIENT
Start: 2020-06-04 | End: 2020-10-26 | Stop reason: SDUPTHER

## 2020-06-18 RX ORDER — ALBUTEROL SULFATE 90 UG/1
AEROSOL, METERED RESPIRATORY (INHALATION)
Qty: 24 G | Refills: 0 | OUTPATIENT
Start: 2020-06-18

## 2020-07-30 ENCOUNTER — TELEPHONE (OUTPATIENT)
Dept: FAMILY MEDICINE CLINIC | Age: 45
End: 2020-07-30

## 2020-07-30 NOTE — TELEPHONE ENCOUNTER
Gurugela Cy sent to [facility] Clinical Staff               Subject: Message to Provider     QUESTIONS   Information for Provider? pt requesting np apt for her in her    Joanne Tinoco  [birth year]. has Elizabeth Schulte they really would like to switch to   dr Manoj Mitchell. please f/u asap   ---------------------------------------------------------------------------   --------------   CALL BACK INFO   What is the best way for the office to contact you? OK to leave message on   voicemail   Preferred Call Back Phone Number? [phone]   ---------------------------------------------------------------------------   --------------   SCRIPT ANSWERS   Relationship to Patient?  Self

## 2020-10-26 ENCOUNTER — OFFICE VISIT (OUTPATIENT)
Dept: FAMILY MEDICINE CLINIC | Age: 45
End: 2020-10-26
Payer: COMMERCIAL

## 2020-10-26 VITALS
TEMPERATURE: 97.2 F | BODY MASS INDEX: 42.41 KG/M2 | DIASTOLIC BLOOD PRESSURE: 80 MMHG | HEIGHT: 68 IN | OXYGEN SATURATION: 98 % | HEART RATE: 68 BPM | SYSTOLIC BLOOD PRESSURE: 116 MMHG | WEIGHT: 279.8 LBS

## 2020-10-26 PROCEDURE — 99396 PREV VISIT EST AGE 40-64: CPT | Performed by: FAMILY MEDICINE

## 2020-10-26 RX ORDER — MELOXICAM 15 MG/1
TABLET ORAL
Qty: 90 TABLET | Refills: 1 | Status: SHIPPED | OUTPATIENT
Start: 2020-10-26 | End: 2021-05-04 | Stop reason: SDUPTHER

## 2020-10-26 RX ORDER — OMEPRAZOLE 40 MG/1
CAPSULE, DELAYED RELEASE ORAL
Qty: 90 CAPSULE | Refills: 1 | Status: SHIPPED | OUTPATIENT
Start: 2020-10-26 | End: 2021-05-04 | Stop reason: SDUPTHER

## 2020-10-26 RX ORDER — ALBUTEROL SULFATE 90 UG/1
AEROSOL, METERED RESPIRATORY (INHALATION)
Qty: 3 INHALER | Refills: 1 | Status: SHIPPED | OUTPATIENT
Start: 2020-10-26 | End: 2021-05-03

## 2020-10-26 RX ORDER — AMLODIPINE BESYLATE 5 MG/1
TABLET ORAL
Qty: 90 TABLET | Refills: 1 | Status: SHIPPED | OUTPATIENT
Start: 2020-10-26 | End: 2021-05-04 | Stop reason: SDUPTHER

## 2020-10-26 RX ORDER — TRAZODONE HYDROCHLORIDE 50 MG/1
TABLET ORAL
Qty: 180 TABLET | Refills: 1 | Status: SHIPPED | OUTPATIENT
Start: 2020-10-26 | End: 2021-05-04 | Stop reason: SDUPTHER

## 2020-10-26 RX ORDER — LEVETIRACETAM 1000 MG/1
TABLET ORAL
Qty: 180 TABLET | Refills: 1 | Status: SHIPPED | OUTPATIENT
Start: 2020-10-26 | End: 2021-05-04 | Stop reason: SDUPTHER

## 2020-10-26 RX ORDER — LISINOPRIL AND HYDROCHLOROTHIAZIDE 20; 12.5 MG/1; MG/1
TABLET ORAL
Qty: 90 TABLET | Refills: 1 | Status: SHIPPED | OUTPATIENT
Start: 2020-10-26 | End: 2021-05-04 | Stop reason: SDUPTHER

## 2020-10-26 ASSESSMENT — PATIENT HEALTH QUESTIONNAIRE - PHQ9
SUM OF ALL RESPONSES TO PHQ QUESTIONS 1-9: 0
SUM OF ALL RESPONSES TO PHQ9 QUESTIONS 1 & 2: 0
SUM OF ALL RESPONSES TO PHQ QUESTIONS 1-9: 0
1. LITTLE INTEREST OR PLEASURE IN DOING THINGS: 0
SUM OF ALL RESPONSES TO PHQ QUESTIONS 1-9: 0
2. FEELING DOWN, DEPRESSED OR HOPELESS: 0

## 2020-10-26 NOTE — PROGRESS NOTES
Ivanna Lei   YOB: 1975    Date of Visit:  10/26/2020        Allergies   Allergen Reactions    Penicillins Rash    Phenobarbital Sodium Diarrhea and Nausea And Vomiting     Outpatient Medications Marked as Taking for the 10/26/20 encounter (Office Visit) with Tessa Jones MD   Medication Sig Dispense Refill    albuterol sulfate  (90 Base) MCG/ACT inhaler INHALE 2 PUFFS ORALLY EVERY SIX HOURS AS NEEDED FOR WHEEZING 3 Inhaler 1    amLODIPine (NORVASC) 5 MG tablet TAKE 1 TABLET BY MOUTH DAILY 90 tablet 1    levETIRAcetam (KEPPRA) 1000 MG tablet TAKE 1 TABLET BY MOUTH 2 TIMES DAILY 180 tablet 1    lisinopril-hydroCHLOROthiazide (PRINZIDE;ZESTORETIC) 20-12.5 MG per tablet TAKE 1 TABLET BY MOUTH EVERY DAY 90 tablet 1    meloxicam (MOBIC) 15 MG tablet TAKE 1 TABLET BY MOUTH DAILY 90 tablet 1    omeprazole (PRILOSEC) 40 MG delayed release capsule TAKE 1 CAPSULE BY MOUTH DAILY 90 capsule 1    traZODone (DESYREL) 50 MG tablet Take 1-2 tabs po qhs prn insomnia 180 tablet 1    cetirizine (ZYRTEC) 10 MG tablet Take 10 mg by mouth daily      melatonin 3 MG TABS tablet Take 6 mg by mouth nightly as needed            Vitals:    10/26/20 1304   BP: 116/80   Site: Left Upper Arm   Position: Sitting   Cuff Size: Medium Adult   Pulse: 68   Temp: 97.2 °F (36.2 °C)   TempSrc: Temporal   SpO2: 98%   Weight: 279 lb 12.8 oz (126.9 kg)   Height: 5' 8\" (1.727 m)     Body mass index is 42.54 kg/m². Wt Readings from Last 3 Encounters:   10/26/20 279 lb 12.8 oz (126.9 kg)   07/18/19 260 lb (117.9 kg)   08/14/18 261 lb (118.4 kg)     BP Readings from Last 3 Encounters:   10/26/20 116/80   07/18/19 100/80   08/14/18 121/79        Chief Complaint   Patient presents with   Beba Sierra [facility] Patient     [facility] care ; medication mgmt       HPI    Federico Daughters presents to establish care. he has the following concerns today:    Seizure disorder:  Stable on his current medications since 2009. His last seizure was July 2018. Last saw a neurologist July 2018 - Dr. Deacon Douglas - he was cleared by neurology and advised to f/u with PCP. Thought 2/2 to shunt. Congenital hydrocephalus:  He has a  shunt since age 3. His was diagnosed at age 3. Was having balance and speech issues. Has no residual issues. Was told he would never need to have shunt replaced unless became symptomatic. Has yearly eye exams to measure pressure behind his eyes. Having no coordination or speech issues. Asthma: Rarely needs albuterol. Hypertension: Stable on current medication regimen. GERD: On omeprazole daily. Insomnia: on trazondone. Drop foot:  L side. Has a brace he has worn the last 10 years. Has chronic pain. Takes mobic daily. Hx of urethral stent:  S/p surgery x2 - last 2005. Dx age 13. 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 Present since age 6. SH:  Lives with wife Lashawn Flores who is a patient here). Has 1 child- 33 yo step- son. .      Past Medical History:   Diagnosis Date    Asthma     SEASONAL    Bronchitis, acute     Disorder of skin and subcutaneous tissue     Hydrocephalus, congenital (HCC)     Hypertension     Need for prophylactic vaccination against Streptococcus pneumoniae (pneumococcus)     Need for prophylactic vaccination and inoculation against influenza     Need for prophylactic vaccination or inoculation against diphtheria and tetanus     Osteoarthrosis, unspecified whether generalized or localized, unspecified site     foot and ankle    Pharyngitis     PONV (postoperative nausea and vomiting)     Seizure ([address]     started as infant, then none x 30 yrs, none since 2009    Seizures ([address] 06/05/2018    LAST SEIZURE     Sinusitis, acute     Upper respiratory infection        Past Surgical History:   Procedure Laterality Date    FOOT SURGERY Right     gastrocnemius recession    HYDROCELE EXCISION Right 06/2017    OTHER SURGICAL HISTORY      URETHROPLASTY X 2    URETHRAL STRICTURE DILATATION      x2    VENTRICULOPERITONEAL SHUNT         Social History     Socioeconomic History    Marital status:      Spouse name: Not on file    Number of children: Not on file    Years of education: Not on file    Highest education level: Not on file   Occupational History    Not on file   Social Needs    Financial resource strain: Not on file    Food insecurity     Worry: Not on file     Inability: Not on file    Transportation needs     Medical: Not on file     Non-medical: Not on file   Tobacco Use    Smoking status: Former Smoker     Last attempt to quit: 5/10/1996     Years since quittin.4    Smokeless tobacco: Never Used   Substance and Sexual Activity    Alcohol use: Yes     Alcohol/week: 5.0 standard drinks     Types: 5 Cans of beer per week     Comment: 5 BEERS WEEKLY    Drug use: No    Sexual activity: Not on file   Lifestyle    Physical activity     Days per week: Not on file     Minutes per session: Not on file    Stress: Not on file   Relationships    Social connections     Talks on phone: Not on file     Gets together: Not on file     Attends Islam service: Not on file     Active member of club or organization: Not on file     Attends meetings of clubs or organizations: Not on file     Relationship status: Not on file    Intimate partner violence     Fear of current or ex partner: Not on file     Emotionally abused: Not on file     Physically abused: Not on file     Forced sexual activity: Not on file   Other Topics Concern    Not on file   Social History Narrative    Not on file       Family History   Problem Relation Age of Onset    Rheum Arthritis Mother     High Blood Pressure Mother     Cancer Maternal Grandmother         lung    Other Maternal Grandfather         parkinsons disease    Heart Disease Father          Review of Systems  Complete review of systems negative except as documented in the HPI.     Physical Exam  Constitutional:       General: He is not in acute distress. Appearance: He is well-developed. HENT:      Head: Normocephalic and atraumatic. Right Ear: Tympanic membrane, ear canal and external ear normal. There is no impacted cerumen. Tympanic membrane is not injected or bulging. Left Ear: Tympanic membrane, ear canal and external ear normal. There is no impacted cerumen. Tympanic membrane is not injected or bulging. Eyes:      General: Lids are normal.         Right eye: No discharge. Left eye: No discharge. Extraocular Movements: Extraocular movements intact. Conjunctiva/sclera: Conjunctivae normal.      Pupils: Pupils are equal, round, and reactive to light. Neck:      Musculoskeletal: Normal range of motion and neck supple. Thyroid: No thyromegaly. Trachea: No tracheal deviation. Cardiovascular:      Rate and Rhythm: Normal rate and regular rhythm. Heart sounds: Normal heart sounds. No murmur. Pulmonary:      Effort: Pulmonary effort is normal. No respiratory distress. Breath sounds: Normal breath sounds. Abdominal:      General: Bowel sounds are normal. There is no distension. Palpations: Abdomen is soft. Tenderness: There is no abdominal tenderness. Musculoskeletal:         General: No swelling. Comments: Normal gait, normal muscle tone   Lymphadenopathy:      Cervical: No cervical adenopathy. Skin:     General: Skin is warm and dry. Findings: No rash. Neurological:      General: No focal deficit present. Mental Status: He is alert and oriented to person, place, and time. Mental status is at baseline. Cranial Nerves: No cranial nerve deficit. Psychiatric:         Mood and Affect: Mood normal.         Behavior: Behavior normal.         Thought Content: Thought content normal.         Judgment: Judgment normal.           Assessment/Plan     1.  Mild asthma without complication, unspecified whether persistent  Stable. Continue current regimen. - albuterol sulfate  (90 Base) MCG/ACT inhaler; INHALE 2 PUFFS ORALLY EVERY SIX HOURS AS NEEDED FOR WHEEZING  Dispense: 3 Inhaler; Refill: 0    2. Essential hypertension  Stable. Continue current regimen. - amLODIPine (NORVASC) 5 MG tablet; TAKE 1 TABLET BY MOUTH DAILY  Dispense: 90 tablet; Refill: 3  - lisinopril-hydroCHLOROthiazide (PRINZIDE;ZESTORETIC) 20-12.5 MG per tablet; TAKE 1 TABLET BY MOUTH EVERY DAY  Dispense: 90 tablet; Refill: 3    3. 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 Continue current regimen. Discussed seeing neurology again. Patient prefers to continue fu here as stable. If develops any new symptoms will refer back the neuro. - levETIRAcetam (KEPPRA) 1000 MG tablet; TAKE 1 TABLET BY MOUTH 2 TIMES DAILY  Dispense: 180 tablet; Refill: 3    4. Primary osteoarthritis of left ankle  Stable. Continue current regimen. - meloxicam (MOBIC) 15 MG tablet; TAKE 1 TABLET BY MOUTH DAILY  Dispense: 90 tablet; Refill: 3    5. Gastroesophageal reflux disease  Stable. Continue current regimen. - omeprazole (PRILOSEC) 40 MG delayed release capsule; TAKE 1 CAPSULE BY MOUTH DAILY  Dispense: 90 capsule; Refill: 3    6. Primary insomnia  Stable. Continue current regimen. - traZODone (DESYREL) 50 MG tablet; Take 1-2 tabs po qhs prn insomnia  Dispense: 180 tablet; Refill: 1    7. Congenital hydrocephalus (HCC)  Stable. Continue current regimen. 8. Seasonal allergic rhinitis, unspecified trigger  Stable. Continue current regimen. 9. Class 3 severe obesity due to excess calories with serious comorbidity and body mass index (BMI) of 40.0 to 44.9 in adult [facility])  Increased. Counseled on lifestyle modifications including diet and exercise. 10. Congenital stricture of urethra  Stable. Continue current regimen. Resolved. [address] maintenance  Annual physical exam done today.  Counseled on preventative care and a healthy lifestlye. Immunization history reviewed. - Lipid Panel; Future  - HIV Screen; Future  - Comprehensive Metabolic Panel; Future        Discussed medications with patient, who voiced understanding of their use and indications. All questions answered. Return in about 6 months (around 4/26/2021) for Chronic conditions.

## 2020-10-30 ENCOUNTER — HOSPITAL ENCOUNTER (OUTPATIENT)
Age: 45
Discharge: HOME | End: 2020-10-30
Payer: COMMERCIAL

## 2020-10-30 LAB
A/G RATIO: 1.3 (ref 1.1–2.2)
ALBUMIN SERPL-MCNC: 4 G/DL (ref 3.4–5)
ALP BLD-CCNC: 63 U/L (ref 40–129)
ALT SERPL-CCNC: 27 U/L (ref 10–40)
ANION GAP SERPL CALCULATED.3IONS-SCNC: 12 MMOL/L (ref 3–16)
AST SERPL-CCNC: 16 U/L (ref 15–37)
BILIRUB SERPL-MCNC: 0.8 MG/DL (ref 0–1)
BUN BLDV-MCNC: 15 MG/DL (ref 7–20)
CALCIUM SERPL-MCNC: 8.8 MG/DL (ref 8.3–10.6)
CHLORIDE BLD-SCNC: 98 MMOL/L (ref 99–110)
CHOLESTEROL, TOTAL: 123 MG/DL (ref 0–199)
CO2: 25 MMOL/L (ref 21–32)
CREAT SERPL-MCNC: 1 MG/DL (ref 0.9–1.3)
GFR AFRICAN AMERICAN: >60
GFR NON-AFRICAN AMERICAN: >60
GLOBULIN: 3 G/DL
GLUCOSE BLD-MCNC: 93 MG/DL (ref 70–99)
HDLC SERPL-MCNC: 31 MG/DL (ref 40–60)
HIV AG/AB: NORMAL
HIV ANTIGEN: NORMAL
HIV-1 ANTIBODY: NORMAL
HIV-2 AB: NORMAL
KEPPRA DOSE AMT: NORMAL
KEPPRA: 28.3 UG/ML (ref 6–46)
LDL CHOLESTEROL CALCULATED: 69 MG/DL
POTASSIUM SERPL-SCNC: 4 MMOL/L (ref 3.5–5.1)
SODIUM BLD-SCNC: 135 MMOL/L (ref 136–145)
TOTAL PROTEIN: 7 G/DL (ref 6.4–8.2)
TRIGL SERPL-MCNC: 113 MG/DL (ref 0–150)
VLDLC SERPL CALC-MCNC: 23 MG/DL

## 2020-10-30 PROCEDURE — 80177 DRUG SCRN QUAN LEVETIRACETAM: CPT

## 2020-10-30 PROCEDURE — 80053 COMPREHEN METABOLIC PANEL: CPT

## 2020-10-30 PROCEDURE — 86701 HIV-1ANTIBODY: CPT

## 2020-10-30 PROCEDURE — 36415 COLL VENOUS BLD VENIPUNCTURE: CPT

## 2020-10-30 PROCEDURE — 87390 HIV-1 AG IA: CPT

## 2020-10-30 PROCEDURE — 80061 LIPID PANEL: CPT

## 2020-10-30 PROCEDURE — 86702 HIV-2 ANTIBODY: CPT

## 2021-04-26 ENCOUNTER — OFFICE VISIT (OUTPATIENT)
Dept: FAMILY MEDICINE CLINIC | Age: 46
End: 2021-04-26
Payer: COMMERCIAL

## 2021-04-26 VITALS
HEART RATE: 64 BPM | WEIGHT: 271 LBS | SYSTOLIC BLOOD PRESSURE: 118 MMHG | BODY MASS INDEX: 41.07 KG/M2 | OXYGEN SATURATION: 99 % | HEIGHT: 68 IN | DIASTOLIC BLOOD PRESSURE: 82 MMHG

## 2021-04-26 DIAGNOSIS — I10 ESSENTIAL HYPERTENSION: Primary | ICD-10-CM

## 2021-04-26 DIAGNOSIS — G40.209 PARTIAL EPILEPSY WITH IMPAIRMENT OF CONSCIOUSNESS (HCC): ICD-10-CM

## 2021-04-26 DIAGNOSIS — E66.01 CLASS 3 SEVERE OBESITY DUE TO EXCESS CALORIES WITH SERIOUS COMORBIDITY AND BODY MASS INDEX (BMI) OF 40.0 TO 44.9 IN ADULT (HCC): ICD-10-CM

## 2021-04-26 DIAGNOSIS — J45.909 MILD ASTHMA WITHOUT COMPLICATION, UNSPECIFIED WHETHER PERSISTENT: ICD-10-CM

## 2021-04-26 DIAGNOSIS — Z00.00 HEALTHCARE MAINTENANCE: ICD-10-CM

## 2021-04-26 DIAGNOSIS — Q03.9 CONGENITAL HYDROCEPHALUS (HCC): ICD-10-CM

## 2021-04-26 PROCEDURE — 99214 OFFICE O/P EST MOD 30 MIN: CPT | Performed by: FAMILY MEDICINE

## 2021-04-26 SDOH — ECONOMIC STABILITY: INCOME INSECURITY: HOW HARD IS IT FOR YOU TO PAY FOR THE VERY BASICS LIKE FOOD, HOUSING, MEDICAL CARE, AND HEATING?: NOT HARD AT ALL

## 2021-04-26 SDOH — ECONOMIC STABILITY: TRANSPORTATION INSECURITY
IN THE PAST 12 MONTHS, HAS LACK OF TRANSPORTATION KEPT YOU FROM MEETINGS, WORK, OR FROM GETTING THINGS NEEDED FOR DAILY LIVING?: NO

## 2021-04-26 ASSESSMENT — PATIENT HEALTH QUESTIONNAIRE - PHQ9: 1. LITTLE INTEREST OR PLEASURE IN DOING THINGS: 0

## 2021-04-26 NOTE — PROGRESS NOTES
Dickson Boydton   YOB: 1975    Date of Visit:  4/26/2021    Allergies   Allergen Reactions    Penicillins Rash    Phenobarbital Sodium Diarrhea and Nausea And Vomiting     Outpatient Medications Marked as Taking for the 4/26/21 encounter (Office Visit) with Jettie Osgood, MD   Medication Sig Dispense Refill    albuterol sulfate  (90 Base) MCG/ACT inhaler INHALE 2 PUFFS ORALLY EVERY SIX HOURS AS NEEDED FOR WHEEZING 3 Inhaler 1    amLODIPine (NORVASC) 5 MG tablet TAKE 1 TABLET BY MOUTH DAILY 90 tablet 1    levETIRAcetam (KEPPRA) 1000 MG tablet TAKE 1 TABLET BY MOUTH 2 TIMES DAILY 180 tablet 1    lisinopril-hydroCHLOROthiazide (PRINZIDE;ZESTORETIC) 20-12.5 MG per tablet TAKE 1 TABLET BY MOUTH EVERY DAY 90 tablet 1    meloxicam (MOBIC) 15 MG tablet TAKE 1 TABLET BY MOUTH DAILY 90 tablet 1    omeprazole (PRILOSEC) 40 MG delayed release capsule TAKE 1 CAPSULE BY MOUTH DAILY 90 capsule 1    traZODone (DESYREL) 50 MG tablet Take 1-2 tabs po qhs prn insomnia 180 tablet 1    cetirizine (ZYRTEC) 10 MG tablet Take 10 mg by mouth daily      melatonin 3 MG TABS tablet Take 6 mg by mouth nightly as needed            Vitals:    04/26/21 0748   BP: 118/82   Pulse: 64   SpO2: 99%   Weight: 271 lb (122.9 kg)   Height: 5' 8\" (1.727 m)     Body mass index is 41.21 kg/m². Wt Readings from Last 3 Encounters:   04/26/21 271 lb (122.9 kg)   10/26/20 279 lb 12.8 oz (126.9 kg)   07/18/19 260 lb (117.9 kg)     BP Readings from Last 3 Encounters:   04/26/21 118/82   10/26/20 116/80   07/18/19 100/80        Chief Complaint   Patient presents with    6 Month Follow-Up       HPI    Seizure disorder:  Stable on his current medications since 2009. His last seizure was July 2018. Last saw a neurologist July 2018 - Dr. Gayla Ignacio - he was cleared by neurology and advised to f/u with PCP. Thought 2/2 to shunt.  Patient prefers not to see neurology again unless has recurrent issues.      Congenital hydrocephalus: He has a  shunt since age 3. His was diagnosed at age 3. Was having balance and speech issues. Has no residual issues. Was told he would never need to have shunt replaced unless became symptomatic. Has yearly eye exams to measure pressure behind his eyes. Having no coordination or speech issues.      Asthma: Rarely needs albuterol.      Hypertension: Stable on current medication regimen.      GERD: On omeprazole daily.      Insomnia: on trazondone.      Drop foot:  L side. Has a brace he has worn the last 10 years. Has chronic pain. Takes mobic daily.      Hx of urethral stent:  S/p surgery x2 - last . Dx age 13.      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 Present since age 6.      SH: Lives with wife Halina Silva who is a patient here). Has 1 child- 31 yo step- son. .        Social History     Socioeconomic History    Marital status:      Spouse name: Not on file    Number of children: Not on file    Years of education: Not on file    Highest education level: Not on file   Occupational History    Not on file   Social Needs    Financial resource strain: Not hard at all   Fernando-Jason insecurity     Worry: Never true     Inability: Never true   [employer] needs     Medical: No     Non-medical: No   Tobacco Use    Smoking status: Former Smoker     Quit date: 5/10/1996     Years since quittin.9    Smokeless tobacco: Never Used   Substance and Sexual Activity    Alcohol use:  Yes     Alcohol/week: 5.0 standard drinks     Types: 5 Cans of beer per week     Comment: 5 BEERS WEEKLY    Drug use: No    Sexual activity: Not on file   Lifestyle    Physical activity     Days per week: Not on file     Minutes per session: Not on file    Stress: Not on file   Relationships    Social connections     Talks on phone: Not on file     Gets together: Not on file     Attends Islam service: Not on file     Active member of club or organization: Not on file     Attends meetings of clubs or organizations: Not on file     Relationship status: Not on file    Intimate partner violence     Fear of current or ex partner: Not on file     Emotionally abused: Not on file     Physically abused: Not on file     Forced sexual activity: Not on file   Other Topics Concern    Not on file   Social History Narrative    Not on file         Review of Systems  As documented in the HPI. Currently no complaints of CP or RACQUEL. Physical Exam  Constitutional:       General: He is not in acute distress. Appearance: He is well-developed. HENT:      Head: Normocephalic and atraumatic. Cardiovascular:      Rate and Rhythm: Normal rate and regular rhythm. Heart sounds: Normal heart sounds. No murmur. Pulmonary:      Effort: Pulmonary effort is normal. No respiratory distress. Breath sounds: Normal breath sounds. Skin:     General: Skin is warm and dry. Neurological:      Mental Status: He is alert. Psychiatric:         Behavior: Behavior normal.           Assessment/Plan     1. Essential hypertension  Stable. Continue current regimen. - Basic Metabolic Panel; Future    2. Healthcare maintenance  - Hepatitis C Antibody; Future    3. 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 Continue current regimen.   - LEVETIRACETAM LEVEL; Future    4. Congenital hydrocephalus (HCC)  Stable. Continue current regimen. 5. Mild asthma without complication, unspecified whether persistent  Stable. Continue current regimen. 6. Class 3 severe obesity due to excess calories with serious comorbidity and body mass index (BMI) of 40.0 to 44.9 in [facility])  Improved. Continue with weight loss efforts. Discussed medications with patient, who voiced understanding of their use and indications. All questions answered. Return in about 6 months (around 10/26/2021) for Chronic conditions, Physical.         Documentation was done using voice recognition dragon software.  Efforts were made to ensure accuracy; however, inadvertent, unintentional computerized transcription errors may be present. --Viktoria Washington MD on 4/26/2021    An electronic signature was used to authenticate this note.

## 2021-05-02 DIAGNOSIS — J45.909 MILD ASTHMA WITHOUT COMPLICATION, UNSPECIFIED WHETHER PERSISTENT: ICD-10-CM

## 2021-05-03 ENCOUNTER — PATIENT MESSAGE (OUTPATIENT)
Dept: FAMILY MEDICINE CLINIC | Age: 46
End: 2021-05-03

## 2021-05-03 DIAGNOSIS — F51.01 PRIMARY INSOMNIA: ICD-10-CM

## 2021-05-03 DIAGNOSIS — G40.209 PARTIAL EPILEPSY WITH IMPAIRMENT OF CONSCIOUSNESS (HCC): ICD-10-CM

## 2021-05-03 DIAGNOSIS — M19.072 PRIMARY OSTEOARTHRITIS OF LEFT ANKLE: ICD-10-CM

## 2021-05-03 DIAGNOSIS — I10 ESSENTIAL HYPERTENSION: ICD-10-CM

## 2021-05-03 RX ORDER — ALBUTEROL SULFATE 90 UG/1
AEROSOL, METERED RESPIRATORY (INHALATION)
Qty: 20.1 INHALER | Refills: 1 | Status: SHIPPED | OUTPATIENT
Start: 2021-05-03 | End: 2022-07-22 | Stop reason: SDUPTHER

## 2021-05-03 NOTE — TELEPHONE ENCOUNTER
Medication:   Requested Prescriptions     Pending Prescriptions Disp Refills    albuterol sulfate  (90 Base) MCG/ACT inhaler [Pharmacy Med Name: ALBUTEROL HFA (PROVENTIL) INH] 20.1 Inhaler 1     Sig: INHALE 2 PUFFS EVERY SIX HOURS AS NEEDED FOR WHEEZING        Last Filled:  10/26/2020 3 inhalers 1 refill     Patient Phone Number: [phone] (home) [phone] (work)    Last appt: 4/26/2021   Next appt: 10/28/2021    Last OARRS: No flowsheet data found.

## 2021-05-03 NOTE — TELEPHONE ENCOUNTER
Medication:   Requested Prescriptions     Pending Prescriptions Disp Refills    levETIRAcetam (KEPPRA) 1000 MG tablet 180 tablet 1     Sig: TAKE 1 TABLET BY MOUTH 2 TIMES DAILY    lisinopril-hydroCHLOROthiazide (PRINZIDE;ZESTORETIC) 20-12.5 MG per tablet 90 tablet 1     Sig: TAKE 1 TABLET BY MOUTH EVERY DAY    amLODIPine (NORVASC) 5 MG tablet 90 tablet 1     Sig: TAKE 1 TABLET BY MOUTH DAILY    traZODone (DESYREL) 50 MG tablet 180 tablet 1     Sig: Take 1-2 tabs po qhs prn insomnia    omeprazole (PRILOSEC) 40 MG delayed release capsule 90 capsule 1     Sig: TAKE 1 CAPSULE BY MOUTH DAILY    meloxicam (MOBIC) 15 MG tablet 90 tablet 1     Sig: TAKE 1 TABLET BY MOUTH DAILY        Last Filled:  10/26/2020 90 tabs 0 refills     Patient Phone Number: [phone] (home) [phone] (work)    Last appt: 4/26/2021   Next appt: 10/28/2021    Last OARRS: No flowsheet data found.

## 2021-05-03 NOTE — TELEPHONE ENCOUNTER
From: Mike Segal  To: Caroline Zarate MD  Sent: 5/3/2021 2:19 PM EDT  Subject: Prescription Question    Dr Robert Gr,    I was recently in your office for a checkup. For some reason my prescriptions were not sent to [facility] in [place] or they did not go thru. Can someone in your office resend? I need refills on    Keppra  Lisinopril  Amlodipine  Trazodone  Omeprazole  Meloxicam     Thank you!

## 2021-05-04 DIAGNOSIS — I10 ESSENTIAL HYPERTENSION: ICD-10-CM

## 2021-05-04 DIAGNOSIS — K21.9 GASTRO-ESOPHAGEAL REFLUX DISEASE WITHOUT ESOPHAGITIS: ICD-10-CM

## 2021-05-04 DIAGNOSIS — G40.209 PARTIAL EPILEPSY WITH IMPAIRMENT OF CONSCIOUSNESS (HCC): ICD-10-CM

## 2021-05-04 DIAGNOSIS — F51.01 PRIMARY INSOMNIA: ICD-10-CM

## 2021-05-04 RX ORDER — LEVETIRACETAM 1000 MG/1
TABLET ORAL
Qty: 180 TABLET | Refills: 1 | Status: SHIPPED | OUTPATIENT
Start: 2021-05-04 | End: 2021-05-04

## 2021-05-04 RX ORDER — MELOXICAM 15 MG/1
TABLET ORAL
Qty: 90 TABLET | Refills: 1 | Status: SHIPPED | OUTPATIENT
Start: 2021-05-04 | End: 2021-05-06 | Stop reason: SDUPTHER

## 2021-05-04 RX ORDER — TRAZODONE HYDROCHLORIDE 50 MG/1
TABLET ORAL
Qty: 180 TABLET | Refills: 1 | Status: SHIPPED | OUTPATIENT
Start: 2021-05-04 | End: 2021-05-06 | Stop reason: SDUPTHER

## 2021-05-04 RX ORDER — OMEPRAZOLE 40 MG/1
CAPSULE, DELAYED RELEASE ORAL
Qty: 90 CAPSULE | Refills: 1 | Status: SHIPPED | OUTPATIENT
Start: 2021-05-04 | End: 2021-05-04

## 2021-05-04 RX ORDER — TRAZODONE HYDROCHLORIDE 50 MG/1
TABLET ORAL
Qty: 180 TABLET | Refills: 1 | Status: SHIPPED | OUTPATIENT
Start: 2021-05-04 | End: 2021-05-04

## 2021-05-04 RX ORDER — LEVETIRACETAM 1000 MG/1
TABLET ORAL
Qty: 180 TABLET | Refills: 1 | Status: SHIPPED | OUTPATIENT
Start: 2021-05-04 | End: 2021-05-06 | Stop reason: SDUPTHER

## 2021-05-04 RX ORDER — LISINOPRIL AND HYDROCHLOROTHIAZIDE 20; 12.5 MG/1; MG/1
TABLET ORAL
Qty: 90 TABLET | Refills: 1 | Status: SHIPPED | OUTPATIENT
Start: 2021-05-04 | End: 2021-05-04

## 2021-05-04 RX ORDER — LISINOPRIL AND HYDROCHLOROTHIAZIDE 20; 12.5 MG/1; MG/1
TABLET ORAL
Qty: 90 TABLET | Refills: 1 | Status: SHIPPED | OUTPATIENT
Start: 2021-05-04 | End: 2021-05-06 | Stop reason: SDUPTHER

## 2021-05-04 RX ORDER — OMEPRAZOLE 40 MG/1
CAPSULE, DELAYED RELEASE ORAL
Qty: 90 CAPSULE | Refills: 1 | Status: SHIPPED | OUTPATIENT
Start: 2021-05-04 | End: 2021-05-06 | Stop reason: SDUPTHER

## 2021-05-04 RX ORDER — AMLODIPINE BESYLATE 5 MG/1
TABLET ORAL
Qty: 90 TABLET | Refills: 1 | Status: SHIPPED | OUTPATIENT
Start: 2021-05-04 | End: 2021-05-04

## 2021-05-04 RX ORDER — AMLODIPINE BESYLATE 5 MG/1
TABLET ORAL
Qty: 90 TABLET | Refills: 1 | Status: SHIPPED | OUTPATIENT
Start: 2021-05-04 | End: 2021-05-06 | Stop reason: SDUPTHER

## 2021-05-06 DIAGNOSIS — I10 ESSENTIAL HYPERTENSION: ICD-10-CM

## 2021-05-06 DIAGNOSIS — M19.072 PRIMARY OSTEOARTHRITIS OF LEFT ANKLE: ICD-10-CM

## 2021-05-06 DIAGNOSIS — K21.9 GASTRO-ESOPHAGEAL REFLUX DISEASE WITHOUT ESOPHAGITIS: ICD-10-CM

## 2021-05-06 DIAGNOSIS — G40.209 PARTIAL EPILEPSY WITH IMPAIRMENT OF CONSCIOUSNESS (HCC): ICD-10-CM

## 2021-05-06 DIAGNOSIS — F51.01 PRIMARY INSOMNIA: ICD-10-CM

## 2021-05-06 RX ORDER — MELOXICAM 15 MG/1
TABLET ORAL
Qty: 90 TABLET | Refills: 1 | Status: SHIPPED | OUTPATIENT
Start: 2021-05-06 | End: 2021-10-28 | Stop reason: SDUPTHER

## 2021-05-06 RX ORDER — TRAZODONE HYDROCHLORIDE 50 MG/1
TABLET ORAL
Qty: 180 TABLET | Refills: 1 | Status: SHIPPED | OUTPATIENT
Start: 2021-05-06 | End: 2021-10-28 | Stop reason: SDUPTHER

## 2021-05-06 RX ORDER — LISINOPRIL AND HYDROCHLOROTHIAZIDE 20; 12.5 MG/1; MG/1
1 TABLET ORAL DAILY
Qty: 90 TABLET | Refills: 1 | Status: SHIPPED | OUTPATIENT
Start: 2021-05-06 | End: 2021-10-28 | Stop reason: SDUPTHER

## 2021-05-06 RX ORDER — OMEPRAZOLE 40 MG/1
CAPSULE, DELAYED RELEASE ORAL
Qty: 90 CAPSULE | Refills: 1 | Status: SHIPPED | OUTPATIENT
Start: 2021-05-06 | End: 2021-10-28 | Stop reason: SDUPTHER

## 2021-05-06 RX ORDER — AMLODIPINE BESYLATE 5 MG/1
TABLET ORAL
Qty: 90 TABLET | Refills: 1 | Status: SHIPPED | OUTPATIENT
Start: 2021-05-06 | End: 2021-10-28 | Stop reason: SDUPTHER

## 2021-05-06 RX ORDER — LEVETIRACETAM 1000 MG/1
TABLET ORAL
Qty: 180 TABLET | Refills: 1 | Status: SHIPPED | OUTPATIENT
Start: 2021-05-06 | End: 2021-10-28 | Stop reason: SDUPTHER

## 2021-05-06 NOTE — TELEPHONE ENCOUNTER
Medication and Quantity requested: levETIRAcetam (KEPPRA) 1000 MG tablet   QTY:180   Medication and Quantity requested: omeprazole (PRILOSEC) 40 MG delayed release capsule  QTY:90  Medication and Quantity requested: lisinopril-hydroCHLOROthiazide (PRINZIDE;ZESTORETIC) 20-12.5 MG per tablet   QTY:90  Medication and Quantity requested: amLODIPine (NORVASC) 5 MG tablet   QTY:90  Medication and Quantity requested:traZODone (DESYREL) 50 MG tablet  QTY:180  Medication and Quantity requested:meloxicam (MOBIC) 15 MG tablet   QTY:90     Last Visit  4/26/21    Pharmacy and phone number updated in EPIC:  Yes  [facility]      All rx were sent to wrong pharmacy                          Last Visit       Pharmacy and phone number updated in EPIC:  yes

## 2021-05-06 NOTE — TELEPHONE ENCOUNTER
LOV 04/26/21 NOV 10/28/21    90 day refill requests pending, they were sent on 5/4/21 to local [facility], [facility]

## 2021-08-27 ENCOUNTER — PATIENT MESSAGE (OUTPATIENT)
Dept: FAMILY MEDICINE CLINIC | Age: 46
End: 2021-08-27

## 2021-08-27 NOTE — TELEPHONE ENCOUNTER
From: Leona Cabrera  To: Magi Ortega MD  Sent: 8/27/2021 3:16 PM EDT  Subject: Non-Urgent Medical Question    Dr Meghan Bird,    I am needing assistance with paperwork to obtain a handicap parking sticker. I have a congenital foot/ankle defect with nerve pain (drop foot). Can I drop forms off for your staff to complete or is an appointment required?

## 2021-10-28 ENCOUNTER — OFFICE VISIT (OUTPATIENT)
Dept: FAMILY MEDICINE CLINIC | Age: 46
End: 2021-10-28
Payer: COMMERCIAL

## 2021-10-28 VITALS
BODY MASS INDEX: 38.92 KG/M2 | SYSTOLIC BLOOD PRESSURE: 128 MMHG | HEIGHT: 68 IN | OXYGEN SATURATION: 98 % | DIASTOLIC BLOOD PRESSURE: 82 MMHG | WEIGHT: 256.8 LBS | HEART RATE: 61 BPM

## 2021-10-28 DIAGNOSIS — G40.209 PARTIAL EPILEPSY WITH IMPAIRMENT OF CONSCIOUSNESS (HCC): ICD-10-CM

## 2021-10-28 DIAGNOSIS — Z00.00 HEALTHCARE MAINTENANCE: Primary | ICD-10-CM

## 2021-10-28 DIAGNOSIS — M19.072 PRIMARY OSTEOARTHRITIS OF LEFT ANKLE: ICD-10-CM

## 2021-10-28 DIAGNOSIS — K21.9 GASTRO-ESOPHAGEAL REFLUX DISEASE WITHOUT ESOPHAGITIS: ICD-10-CM

## 2021-10-28 DIAGNOSIS — I10 ESSENTIAL HYPERTENSION: ICD-10-CM

## 2021-10-28 DIAGNOSIS — F51.01 PRIMARY INSOMNIA: ICD-10-CM

## 2021-10-28 DIAGNOSIS — J45.909 MILD ASTHMA WITHOUT COMPLICATION, UNSPECIFIED WHETHER PERSISTENT: ICD-10-CM

## 2021-10-28 PROCEDURE — 90674 CCIIV4 VAC NO PRSV 0.5 ML IM: CPT | Performed by: FAMILY MEDICINE

## 2021-10-28 PROCEDURE — 99396 PREV VISIT EST AGE 40-64: CPT | Performed by: FAMILY MEDICINE

## 2021-10-28 PROCEDURE — 90471 IMMUNIZATION ADMIN: CPT | Performed by: FAMILY MEDICINE

## 2021-10-28 RX ORDER — TRAZODONE HYDROCHLORIDE 50 MG/1
TABLET ORAL
Qty: 180 TABLET | Refills: 1 | Status: SHIPPED | OUTPATIENT
Start: 2021-10-28 | End: 2022-05-23 | Stop reason: SDUPTHER

## 2021-10-28 RX ORDER — LEVETIRACETAM 1000 MG/1
TABLET ORAL
Qty: 180 TABLET | Refills: 1 | Status: SHIPPED | OUTPATIENT
Start: 2021-10-28 | End: 2022-05-23 | Stop reason: SDUPTHER

## 2021-10-28 RX ORDER — CETIRIZINE HYDROCHLORIDE 10 MG/1
10 TABLET ORAL DAILY
Qty: 30 TABLET | Refills: 0 | Status: CANCELLED | OUTPATIENT
Start: 2021-10-28

## 2021-10-28 RX ORDER — OMEPRAZOLE 40 MG/1
CAPSULE, DELAYED RELEASE ORAL
Qty: 90 CAPSULE | Refills: 1 | Status: SHIPPED | OUTPATIENT
Start: 2021-10-28 | End: 2022-05-23 | Stop reason: SDUPTHER

## 2021-10-28 RX ORDER — LANOLIN ALCOHOL/MO/W.PET/CERES
6 CREAM (GRAM) TOPICAL NIGHTLY PRN
Status: CANCELLED | OUTPATIENT
Start: 2021-10-28

## 2021-10-28 RX ORDER — ALBUTEROL SULFATE 90 UG/1
AEROSOL, METERED RESPIRATORY (INHALATION)
Qty: 1 EACH | Refills: 0 | Status: CANCELLED | OUTPATIENT
Start: 2021-10-28

## 2021-10-28 RX ORDER — LISINOPRIL AND HYDROCHLOROTHIAZIDE 20; 12.5 MG/1; MG/1
1 TABLET ORAL DAILY
Qty: 90 TABLET | Refills: 1 | Status: SHIPPED | OUTPATIENT
Start: 2021-10-28 | End: 2022-05-23 | Stop reason: SDUPTHER

## 2021-10-28 RX ORDER — AMLODIPINE BESYLATE 5 MG/1
TABLET ORAL
Qty: 90 TABLET | Refills: 1 | Status: SHIPPED | OUTPATIENT
Start: 2021-10-28 | End: 2022-05-23 | Stop reason: SDUPTHER

## 2021-10-28 RX ORDER — MELOXICAM 15 MG/1
TABLET ORAL
Qty: 90 TABLET | Refills: 1 | Status: SHIPPED | OUTPATIENT
Start: 2021-10-28 | End: 2022-05-23 | Stop reason: SDUPTHER

## 2021-10-28 NOTE — PROGRESS NOTES
History and Physical      Carol Du  YOB: 1975    Date of Service:  10/28/2021    Chief Complaint:   Carol Du is a 48 y.o. male who presents for complete physical examination. Chief Complaint   Patient presents with    Follow-up     Chronic conditions       HPI: Seizure disorder:  Stable on his current medications since 2009.  His last seizure was July 2018. Last saw a neurologist July 2018 - Dr. Abhijit Regalado - he was cleared by neurology and advised to f/u with PCP.  Thought 2/2 to shunt. Patient prefers not to see neurology again unless has recurrent issues.      Congenital hydrocephalus:  He has a  shunt since age 3.  His was diagnosed at age 3. Was having balance and speech issues.  Has no residual issues. Was told he would never need to have shunt replaced unless became symptomatic. Has yearly eye exams to measure pressure behind his eyes. Having no coordination or speech issues. Has multiple fibromas related to this condition. Obesity: working diligently on diet and loosing weight.      Asthma: Rarely needs albuterol.      Hypertension: Stable on current medication regimen.      GERD: On omeprazole daily.      Insomnia: on trazondone.      Drop foot:  L side. Has a brace he has worn the last 10 years. Has chronic pain.  Takes mobic daily.      Hx of urethral stent:  S/p surgery x2 - last 2005. Dx age 13.      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 Present since age 6.      SH:  Lives with wife (Suzanne Rowan who is a patient here).  Has 1 child- 33 yo step- son.  .      Vitals:    10/28/21 0743   BP: 128/82   Pulse: 61   SpO2: 98%   Weight: 256 lb 12.8 oz (116.5 kg)   Height: 5' 8\" (1.727 m)       Wt Readings from Last 3 Encounters:   10/28/21 256 lb 12.8 oz (116.5 kg)   04/26/21 271 lb (122.9 kg)   10/26/20 279 lb 12.8 oz (126.9 kg)     BP Readings from Last 3 Encounters:   10/28/21 128/82   04/26/21 118/82   10/26/20 116/80       Patient Active Problem List Diagnosis    Esophageal reflux    Asthma    Essential hypertension    Family history of ischemic heart disease    Allergic rhinitis    Urethral stricture    Partial epilepsy with impairment of consciousness ([facility]    Congenital hydrocephalus (HCC)    OA (osteoarthritis) of ankle    Obesity       Preventive Care:  Health Maintenance   Topic Date Due    Hepatitis C screen  Never done    Colon cancer screen colonoscopy  Never done    Potassium monitoring  10/30/2021    Creatinine monitoring  10/30/2021    DTaP/Tdap/Td vaccine (2 - Td or Tdap) 10/08/2024    Lipid screen  10/30/2025    Pneumococcal 0-64 years Vaccine (2 of 2 - PPSV23) 03/07/2038    Flu vaccine  Completed    COVID-19 Vaccine  Completed    HIV screen  Completed    Hepatitis A vaccine  Aged Out    Hepatitis B vaccine  Aged Out    Hib vaccine  Aged Out    Meningococcal (ACWY) vaccine  Aged Out        Immunization History   Administered Date(s) Administered    COVID-19, Pfizer, PF, 30mcg/0.3mL 03/18/2021, 04/08/2021    Influenza Virus Vaccine 10/19/2020    Influenza Whole 11/01/2015    Influenza, MDCK Quadv, IM, PF (Flucelvax 2 yrs and older) 10/28/2021    Pneumococcal Conjugate 13-valent ([id number]) 07/18/2019    Pneumococcal Polysaccharide ([id number]) 07/18/2018    Tdap (Boostrix, Adacel) 10/08/2014       Allergies   Allergen Reactions    Penicillins Rash    Phenobarbital Sodium Diarrhea and Nausea And Vomiting     Outpatient Medications Marked as Taking for the 10/28/21 encounter (Office Visit) with Aaron Crawley MD   Medication Sig Dispense Refill    meloxicam (MOBIC) 15 MG tablet TAKE 1 TABLET BY MOUTH DAILY 90 tablet 1    lisinopril-hydroCHLOROthiazide (PRINZIDE;ZESTORETIC) 20-12.5 MG per tablet Take 1 tablet by mouth daily 90 tablet 1    omeprazole (PRILOSEC) 40 MG delayed release capsule TAKE 1 CAPSULE BY MOUTH EVERY DAY 90 capsule 1    amLODIPine (NORVASC) 5 MG tablet TAKE 1 TABLET BY MOUTH EVERY DAY 90 tablet 1    levETIRAcetam (KEPPRA) 1000 MG tablet TAKE 1 TABLET BY MOUTH TWICE A  tablet 1    traZODone (DESYREL) 50 MG tablet TAKE 1 TO 2 TABLETS BY MOUTH AT BEDTIME AS NEEDED FOR INSOMNIA 180 tablet 1    albuterol sulfate  (90 Base) MCG/ACT inhaler INHALE 2 PUFFS EVERY SIX HOURS AS NEEDED FOR WHEEZING 20.1 Inhaler 1    cetirizine (ZYRTEC) 10 MG tablet Take 10 mg by mouth daily      melatonin 3 MG TABS tablet Take 6 mg by mouth nightly as needed          Past Medical History:   Diagnosis Date    Asthma     SEASONAL    Bronchitis, acute     Disorder of skin and subcutaneous tissue     Hydrocephalus, congenital (HCC)     Hypertension     Need for prophylactic vaccination against Streptococcus pneumoniae (pneumococcus)     Need for prophylactic vaccination and inoculation against influenza     Need for prophylactic vaccination or inoculation against diphtheria and tetanus     Osteoarthrosis, unspecified whether generalized or localized, unspecified site     foot and ankle    Pharyngitis     PONV (postoperative nausea and vomiting)     Seizure ([address]     started as infant, then none x 30 yrs, none since 2009    Seizures ([address] 06/05/2018    LAST SEIZURE     Sinusitis, acute     Upper respiratory infection      Past Surgical History:   Procedure Laterality Date    FOOT SURGERY Right     gastrocnemius recession    HYDROCELE EXCISION Right 06/2017    OTHER SURGICAL HISTORY      URETHROPLASTY X 2    URETHRAL STRICTURE DILATATION      x2    VENTRICULOPERITONEAL SHUNT       Family History   Problem Relation Age of Onset    Rheum Arthritis Mother     High Blood Pressure Mother     Cancer Maternal Grandmother         lung    Other Maternal Grandfather         parkinsons disease    Heart Disease Father      Social History     Socioeconomic History    Marital status:      Spouse name: Not on file    Number of children: Not on file    Years of education: Not on file   [place] Highest education level: Not on file   Occupational History    Not on file   Tobacco Use    Smoking status: Former Smoker     Quit date: 5/10/1996     Years since quittin.4    Smokeless tobacco: Never Used   Vaping Use    Vaping Use: Never used   Substance and Sexual Activity    Alcohol use: Yes     Alcohol/week: 5.0 standard drinks     Types: 5 Cans of beer per week     Comment: 5 BEERS WEEKLY    Drug use: No    Sexual activity: Not on file   Other Topics Concern    Not on file   Social History Narrative    Not on file     Social Determinants of Health     Financial Resource Strain: Low Risk     Difficulty of Paying Living Expenses: Not hard at all   Food Insecurity: No Food Insecurity    Worried About Running Out of Food in the Last Year: Never true    Marisabel of Food in the Last Year: Never true   Transportation Needs: No Transportation Needs    Lack of Transportation (Medical): No    Lack of Transportation (Non-Medical): No   Physical Activity:     Days of Exercise per Week:     Minutes of Exercise per Session:    Stress:     Feeling of Stress :    Social Connections:     Frequency of Communication with Friends and Family:     Frequency of Social Gatherings with Friends and Family:     Attends Catholic Services:     Active Member of Clubs or Organizations:     Attends Club or Organization Meetings:     Marital Status:    Intimate Partner Violence:     Fear of Current or Ex-Partner:     Emotionally Abused:     Physically Abused:     Sexually Abused:        Review of Systems:  A comprehensive review of systems was negative except for what was noted in the HPI. Physical Exam:   Vitals:    10/28/21 0743   BP: 128/82   Pulse: 61   SpO2: 98%   Weight: 256 lb 12.8 oz (116.5 kg)   Height: 5' 8\" (1.727 m)     Body mass index is 39.05 kg/m². Constitutional: He is oriented to person, place, and time. He appears well-developed and well-nourished. No distress.    HEENT:   Head: Normocephalic and atraumatic. Right Ear: Tympanic membrane, external ear and ear canal normal.   Left Ear: Tympanic membrane, external ear and ear canal normal.   Nose: Nose normal.   Mouth/Throat: He has no cervical adenopathy. Eyes: Conjunctivae and extraocular motions are normal. Pupils are equal, round, and reactive to light. Neck: Full passive range of motion without pain. Neck supple. No mass and no thyromegaly present. Cardiovascular: Normal rate, regular rhythm, normal heart sounds. No murmur heard. Pulmonary/Chest: Effort normal and breath sounds normal. No respiratory distress. He has no wheezes, rhonchi or rales. Abdominal: Soft, non-tender. Bowel sounds and aorta are normal. There is no noticeable organomegaly, mass or bruit. Musculoskeletal: He exhibits no edema. Neurological: He is alert and oriented to person, place, and time. No cranial nerve deficit. Coordination normal.   Skin: Skin is warm, dry and intact. Psychiatric: He has a normal mood and affect. His speech is normal and behavior is normal. Judgment, cognition and memory are normal.           Assessment/Plan     1. Primary osteoarthritis of left ankle  Stable. Continue current regimen. Risks of NSAIDS discussed. - meloxicam (MOBIC) 15 MG tablet; TAKE 1 TABLET BY MOUTH DAILY  Dispense: 90 tablet; Refill: 1    2. Essential hypertension  Stable. Continue current regimen. - lisinopril-hydroCHLOROthiazide (PRINZIDE;ZESTORETIC) 20-12.5 MG per tablet; Take 1 tablet by mouth daily  Dispense: 90 tablet; Refill: 1  - amLODIPine (NORVASC) 5 MG tablet; TAKE 1 TABLET BY MOUTH EVERY DAY  Dispense: 90 tablet; Refill: 1    3. Gastro-esophageal reflux disease without esophagitis  Stable. Continue current regimen. Reports symptoms if does not take PPI daily. - omeprazole (PRILOSEC) 40 MG delayed release capsule; TAKE 1 CAPSULE BY MOUTH EVERY DAY  Dispense: 90 capsule; Refill: 1    4.  Partial epilepsy with impairment of consciousness ([facility]  Stable. Continue current regimen.   - levETIRAcetam (KEPPRA) 1000 MG tablet; TAKE 1 TABLET BY MOUTH TWICE A DAY  Dispense: 180 tablet; Refill: 1  - LEVETIRACETAM LEVEL; Future    5. Primary insomnia  Stable. Continue current regimen. - traZODone (DESYREL) 50 MG tablet; TAKE 1 TO 2 TABLETS BY MOUTH AT BEDTIME AS NEEDED FOR INSOMNIA  Dispense: 180 tablet; Refill: 1    6. Mild asthma without complication, unspecified whether persistent  Stable. Continue current regimen. 7. Healthcare maintenance  Annual physical exam done today. Counseled on preventative care and a healthy lifestlye. Immunization history reviewed. - Comprehensive Metabolic Panel; Future  - Lipid Panel; Future  - Hepatitis C Antibody; Future  - AFL - Gio Mars MD, Gastroenterology, [facility]      Discussed medications with patient, who voiced understanding of their use and indications. All questions answered. Return in about 6 months (around 4/28/2022) for Chronic conditions. Documentation was done using voice recognition dragon software. Efforts were made to ensure accuracy; however, inadvertent, unintentional computerized transcription errors may be present. --Kori Mobley MD on 10/28/2021    An electronic signature was used to authenticate this note.

## 2021-11-11 LAB
ALBUMIN SERPL-MCNC: 4.7 G/DL
ALP BLD-CCNC: 75 U/L
ALT SERPL-CCNC: 21 U/L
ANION GAP SERPL CALCULATED.3IONS-SCNC: 1.8 MMOL/L
AST SERPL-CCNC: 15 U/L
AVERAGE GLUCOSE: NORMAL
BASOPHILS ABSOLUTE: 0 /ΜL
BASOPHILS RELATIVE PERCENT: 1 %
BILIRUB SERPL-MCNC: 0.9 MG/DL (ref 0.1–1.4)
BUN BLDV-MCNC: 16 MG/DL
CALCIUM SERPL-MCNC: 9.4 MG/DL
CHLORIDE BLD-SCNC: 98 MMOL/L
CHOLESTEROL, TOTAL: 132 MG/DL
CHOLESTEROL/HDL RATIO: 4
CO2: NORMAL
CREAT SERPL-MCNC: 1.09 MG/DL
EOSINOPHILS ABSOLUTE: 0.2 /ΜL
EOSINOPHILS RELATIVE PERCENT: 3 %
GFR CALCULATED: 80
GLUCOSE BLD-MCNC: 88 MG/DL
HBA1C MFR BLD: 5.8 %
HCT VFR BLD CALC: 48.8 % (ref 41–53)
HDLC SERPL-MCNC: 33 MG/DL (ref 35–70)
HEMOGLOBIN: 16.7 G/DL (ref 13.5–17.5)
LDL CHOLESTEROL CALCULATED: 76 MG/DL (ref 0–160)
LYMPHOCYTES ABSOLUTE: 1.3 /ΜL
LYMPHOCYTES RELATIVE PERCENT: 22 %
MCH RBC QN AUTO: 28.7 PG
MCHC RBC AUTO-ENTMCNC: 34.2 G/DL
MCV RBC AUTO: 84 FL
MONOCYTES ABSOLUTE: 0.7 /ΜL
MONOCYTES RELATIVE PERCENT: 11 %
NEUTROPHILS ABSOLUTE: 3.8 /ΜL
NEUTROPHILS RELATIVE PERCENT: 62 %
NONHDLC SERPL-MCNC: ABNORMAL MG/DL
PLATELET # BLD: 242 K/ΜL
PMV BLD AUTO: NORMAL FL
POTASSIUM SERPL-SCNC: 4.2 MMOL/L
RBC # BLD: 5.82 10^6/ΜL
SODIUM BLD-SCNC: 138 MMOL/L
TOTAL PROTEIN: 7.3
TRIGL SERPL-MCNC: 126 MG/DL
VLDLC SERPL CALC-MCNC: 23 MG/DL
WBC # BLD: 6 10^3/ML

## 2022-05-23 ENCOUNTER — PATIENT MESSAGE (OUTPATIENT)
Dept: FAMILY MEDICINE CLINIC | Age: 47
End: 2022-05-23

## 2022-05-23 DIAGNOSIS — G40.209 PARTIAL EPILEPSY WITH IMPAIRMENT OF CONSCIOUSNESS (HCC): ICD-10-CM

## 2022-05-23 DIAGNOSIS — K21.9 GASTRO-ESOPHAGEAL REFLUX DISEASE WITHOUT ESOPHAGITIS: ICD-10-CM

## 2022-05-23 DIAGNOSIS — I10 ESSENTIAL HYPERTENSION: ICD-10-CM

## 2022-05-23 DIAGNOSIS — M19.072 PRIMARY OSTEOARTHRITIS OF LEFT ANKLE: ICD-10-CM

## 2022-05-23 DIAGNOSIS — F51.01 PRIMARY INSOMNIA: ICD-10-CM

## 2022-05-23 RX ORDER — TRAZODONE HYDROCHLORIDE 50 MG/1
TABLET ORAL
Qty: 90 TABLET | Refills: 0 | Status: SHIPPED | OUTPATIENT
Start: 2022-05-23 | End: 2022-07-22 | Stop reason: SDUPTHER

## 2022-05-23 RX ORDER — OMEPRAZOLE 40 MG/1
CAPSULE, DELAYED RELEASE ORAL
Qty: 180 CAPSULE | Refills: 0 | Status: SHIPPED | OUTPATIENT
Start: 2022-05-23 | End: 2022-07-22 | Stop reason: SDUPTHER

## 2022-05-23 RX ORDER — MELOXICAM 15 MG/1
TABLET ORAL
Qty: 90 TABLET | Refills: 0 | Status: SHIPPED | OUTPATIENT
Start: 2022-05-23 | End: 2022-07-22 | Stop reason: SDUPTHER

## 2022-05-23 RX ORDER — LISINOPRIL AND HYDROCHLOROTHIAZIDE 20; 12.5 MG/1; MG/1
1 TABLET ORAL DAILY
Qty: 90 TABLET | Refills: 0 | Status: SHIPPED | OUTPATIENT
Start: 2022-05-23 | End: 2022-07-22 | Stop reason: SDUPTHER

## 2022-05-23 RX ORDER — LEVETIRACETAM 1000 MG/1
TABLET ORAL
Qty: 180 TABLET | Refills: 0 | Status: SHIPPED | OUTPATIENT
Start: 2022-05-23 | End: 2022-07-22 | Stop reason: SDUPTHER

## 2022-05-23 RX ORDER — AMLODIPINE BESYLATE 5 MG/1
TABLET ORAL
Qty: 90 TABLET | Refills: 0 | Status: SHIPPED | OUTPATIENT
Start: 2022-05-23 | End: 2022-07-22 | Stop reason: SDUPTHER

## 2022-05-23 NOTE — TELEPHONE ENCOUNTER
Medication:   Requested Prescriptions     Pending Prescriptions Disp Refills    lisinopril-hydroCHLOROthiazide (PRINZIDE;ZESTORETIC) 20-12.5 MG per tablet 90 tablet 1     Sig: Take 1 tablet by mouth daily    amLODIPine (NORVASC) 5 MG tablet 90 tablet 1     Sig: TAKE 1 TABLET BY MOUTH EVERY DAY        Last Filled:  10/28/2021 #90 w/1 RF    Patient Phone Number: [phone] (home) [phone] (work)    Last appt: 10/28/2021   Next appt: 6/14/2022    Last OARRS: No flowsheet data found.

## 2022-05-23 NOTE — TELEPHONE ENCOUNTER
From: Sunita Reddy  To: Dr. Jose Montelongo: 2022 8:20 AM EDT  Subject: Refill prescriptions to [facility] -     Due to not being able to see you this morning since you have COVID, can you please send in a new 90-day prescription for the below medications to [facility] (this is a new place that I have not used before so it won't be in the system for me)     Medlert ID#: [id number]  Tel: [phone]  Please include my  ([id number]) and email address ([email or web address]) along with the request.     - Omeprazole 40mg DR capsule 2 times a day  - Meloxicam 15mg 1 time daily  - Trazadone 50mg 1 time daily  - Levetiracetam (Keppra) 1,000mg 2 times a day       Please let me know if you have any questions.      Thanks,  Will Deanna Rico  [phone]

## 2022-05-23 NOTE — TELEPHONE ENCOUNTER
From: Faye Guillaume  To: Dr. Corley Ni: 5/23/2022 8:23 AM EDT  Subject: Refill prescriptions to [address]     [facility],    Since I was unable to see you this morning because you have COVID, can you please send a 90-day refill for the following medications to [facility] please:    - Lisinopril HCTZ 20mg 1 time daily   - Amlodipine 5mg 1 time daily    [facility] should already be in my file. It was previously [address] and then changed to [address]. Please let me know if you have any questions.     Thanks,  Will Mary Alice Harris  [phone]

## 2022-07-22 ENCOUNTER — HOSPITAL ENCOUNTER (OUTPATIENT)
Dept: GENERAL RADIOLOGY | Age: 47
Discharge: HOME | End: 2022-07-22
Payer: COMMERCIAL

## 2022-07-22 ENCOUNTER — OFFICE VISIT (OUTPATIENT)
Dept: FAMILY MEDICINE CLINIC | Age: 47
End: 2022-07-22
Payer: COMMERCIAL

## 2022-07-22 VITALS
BODY MASS INDEX: 39.47 KG/M2 | SYSTOLIC BLOOD PRESSURE: 122 MMHG | OXYGEN SATURATION: 99 % | HEART RATE: 61 BPM | WEIGHT: 260.4 LBS | HEIGHT: 68 IN | DIASTOLIC BLOOD PRESSURE: 74 MMHG | TEMPERATURE: 97.4 F

## 2022-07-22 DIAGNOSIS — J45.909 MILD ASTHMA WITHOUT COMPLICATION, UNSPECIFIED WHETHER PERSISTENT: ICD-10-CM

## 2022-07-22 DIAGNOSIS — M19.072 PRIMARY OSTEOARTHRITIS OF LEFT ANKLE: ICD-10-CM

## 2022-07-22 DIAGNOSIS — Z11.59 NEED FOR HEPATITIS C SCREENING TEST: ICD-10-CM

## 2022-07-22 DIAGNOSIS — M75.01 ADHESIVE CAPSULITIS OF RIGHT SHOULDER: ICD-10-CM

## 2022-07-22 DIAGNOSIS — G40.209 PARTIAL EPILEPSY WITH IMPAIRMENT OF CONSCIOUSNESS (HCC): ICD-10-CM

## 2022-07-22 DIAGNOSIS — I10 ESSENTIAL HYPERTENSION: ICD-10-CM

## 2022-07-22 DIAGNOSIS — F51.01 PRIMARY INSOMNIA: ICD-10-CM

## 2022-07-22 DIAGNOSIS — Q03.9 CONGENITAL HYDROCEPHALUS (HCC): Primary | ICD-10-CM

## 2022-07-22 DIAGNOSIS — M21.42 FLAT FEET, BILATERAL: ICD-10-CM

## 2022-07-22 DIAGNOSIS — Z00.00 HEALTHCARE MAINTENANCE: Primary | ICD-10-CM

## 2022-07-22 DIAGNOSIS — M21.372 LEFT FOOT DROP: ICD-10-CM

## 2022-07-22 DIAGNOSIS — K21.9 GASTRO-ESOPHAGEAL REFLUX DISEASE WITHOUT ESOPHAGITIS: ICD-10-CM

## 2022-07-22 DIAGNOSIS — M21.41 FLAT FEET, BILATERAL: ICD-10-CM

## 2022-07-22 LAB
ANION GAP SERPL CALCULATED.3IONS-SCNC: 11 MMOL/L (ref 3–16)
BUN BLDV-MCNC: 16 MG/DL (ref 7–20)
CALCIUM SERPL-MCNC: 8.8 MG/DL (ref 8.3–10.6)
CHLORIDE BLD-SCNC: 101 MMOL/L (ref 99–110)
CO2: 27 MMOL/L (ref 21–32)
CREAT SERPL-MCNC: 0.9 MG/DL (ref 0.9–1.3)
GFR AFRICAN AMERICAN: >60
GFR NON-AFRICAN AMERICAN: >60
GLUCOSE BLD-MCNC: 91 MG/DL (ref 70–99)
HEPATITIS C ANTIBODY INTERPRETATION: NORMAL
KEPPRA DOSE AMT: NORMAL
KEPPRA: 27.3 UG/ML (ref 6–46)
POTASSIUM SERPL-SCNC: 4.1 MMOL/L (ref 3.5–5.1)
SODIUM BLD-SCNC: 139 MMOL/L (ref 136–145)

## 2022-07-22 PROCEDURE — 99396 PREV VISIT EST AGE 40-64: CPT | Performed by: FAMILY MEDICINE

## 2022-07-22 PROCEDURE — 73030 X-RAY EXAM OF SHOULDER: CPT

## 2022-07-22 RX ORDER — AMLODIPINE BESYLATE 5 MG/1
TABLET ORAL
Qty: 90 TABLET | Refills: 0 | Status: CANCELLED | OUTPATIENT
Start: 2022-07-22

## 2022-07-22 RX ORDER — MELOXICAM 15 MG/1
TABLET ORAL
Qty: 90 TABLET | Refills: 3 | Status: SHIPPED | OUTPATIENT
Start: 2022-07-22

## 2022-07-22 RX ORDER — LEVETIRACETAM 1000 MG/1
TABLET ORAL
Qty: 180 TABLET | Refills: 3 | Status: SHIPPED | OUTPATIENT
Start: 2022-07-22

## 2022-07-22 RX ORDER — ALBUTEROL SULFATE 90 UG/1
AEROSOL, METERED RESPIRATORY (INHALATION)
Qty: 20.1 EACH | Refills: 3 | Status: SHIPPED | OUTPATIENT
Start: 2022-07-22

## 2022-07-22 RX ORDER — LANOLIN ALCOHOL/MO/W.PET/CERES
6 CREAM (GRAM) TOPICAL NIGHTLY PRN
Qty: 180 TABLET | Refills: 3 | Status: SHIPPED | OUTPATIENT
Start: 2022-07-22

## 2022-07-22 RX ORDER — TRAZODONE HYDROCHLORIDE 50 MG/1
TABLET ORAL
Qty: 90 TABLET | Refills: 3 | Status: SHIPPED | OUTPATIENT
Start: 2022-07-22

## 2022-07-22 RX ORDER — CETIRIZINE HYDROCHLORIDE 10 MG/1
10 TABLET ORAL DAILY
Qty: 90 TABLET | Refills: 3 | Status: SHIPPED | OUTPATIENT
Start: 2022-07-22

## 2022-07-22 RX ORDER — OMEPRAZOLE 40 MG/1
CAPSULE, DELAYED RELEASE ORAL
Qty: 180 CAPSULE | Refills: 3 | Status: SHIPPED | OUTPATIENT
Start: 2022-07-22

## 2022-07-22 RX ORDER — LISINOPRIL AND HYDROCHLOROTHIAZIDE 20; 12.5 MG/1; MG/1
1 TABLET ORAL DAILY
Qty: 90 TABLET | Refills: 3 | Status: SHIPPED | OUTPATIENT
Start: 2022-07-22

## 2022-07-22 RX ORDER — LISINOPRIL AND HYDROCHLOROTHIAZIDE 20; 12.5 MG/1; MG/1
1 TABLET ORAL DAILY
Qty: 90 TABLET | Refills: 0 | Status: CANCELLED | OUTPATIENT
Start: 2022-07-22

## 2022-07-22 RX ORDER — AMLODIPINE BESYLATE 5 MG/1
TABLET ORAL
Qty: 90 TABLET | Refills: 3 | Status: SHIPPED | OUTPATIENT
Start: 2022-07-22

## 2022-07-22 SDOH — HEALTH STABILITY: MENTAL HEALTH
STRESS IS WHEN SOMEONE FEELS TENSE, NERVOUS, ANXIOUS, OR CAN'T SLEEP AT NIGHT BECAUSE THEIR MIND IS TROUBLED. HOW STRESSED ARE YOU?: ONLY A LITTLE

## 2022-07-22 SDOH — SOCIAL STABILITY: SOCIAL NETWORK: HOW OFTEN DO YOU ATTENT MEETINGS OF THE CLUB OR ORGANIZATION YOU BELONG TO?: MORE THAN 4 TIMES PER YEAR

## 2022-07-22 SDOH — SOCIAL STABILITY: SOCIAL NETWORK
IN A TYPICAL WEEK, HOW MANY TIMES DO YOU TALK ON THE PHONE WITH FAMILY, FRIENDS, OR NEIGHBORS?: MORE THAN THREE TIMES A WEEK

## 2022-07-22 SDOH — HEALTH STABILITY: PHYSICAL HEALTH: ON AVERAGE, HOW MANY DAYS PER WEEK DO YOU ENGAGE IN MODERATE TO STRENUOUS EXERCISE (LIKE A BRISK WALK)?: 0 DAYS

## 2022-07-22 SDOH — ECONOMIC STABILITY: INCOME INSECURITY: IN THE LAST 12 MONTHS, WAS THERE A TIME WHEN YOU WERE NOT ABLE TO PAY THE MORTGAGE OR RENT ON TIME?: NO

## 2022-07-22 SDOH — ECONOMIC STABILITY: HOUSING INSECURITY
IN THE LAST 12 MONTHS, WAS THERE A TIME WHEN YOU DID NOT HAVE A STEADY PLACE TO SLEEP OR SLEPT IN A SHELTER (INCLUDING NOW)?: NO

## 2022-07-22 SDOH — SOCIAL STABILITY: SOCIAL NETWORK: HOW OFTEN DO YOU GET TOGETHER WITH FRIENDS OR RELATIVES?: THREE TIMES A WEEK

## 2022-07-22 SDOH — HEALTH STABILITY: MENTAL HEALTH: HOW MANY STANDARD DRINKS CONTAINING ALCOHOL DO YOU HAVE ON A TYPICAL DAY?: 1 OR 2

## 2022-07-22 SDOH — SOCIAL STABILITY: SOCIAL NETWORK: HOW OFTEN DO YOU ATTEND CHURCH OR RELIGIOUS SERVICES?: MORE THAN 4 TIMES PER YEAR

## 2022-07-22 SDOH — ECONOMIC STABILITY: HOUSING INSECURITY: IN THE LAST 12 MONTHS, HOW MANY PLACES HAVE YOU LIVED?: 2

## 2022-07-22 SDOH — HEALTH STABILITY: MENTAL HEALTH: HOW OFTEN DO YOU HAVE A DRINK CONTAINING ALCOHOL?: 4 OR MORE TIMES A WEEK

## 2022-07-22 SDOH — ECONOMIC STABILITY: INCOME INSECURITY: HOW HARD IS IT FOR YOU TO PAY FOR THE VERY BASICS LIKE FOOD, HOUSING, MEDICAL CARE, AND HEATING?: NOT HARD AT ALL

## 2022-07-22 SDOH — ECONOMIC STABILITY: FOOD INSECURITY: WITHIN THE PAST 12 MONTHS, THE FOOD YOU BOUGHT JUST DIDN'T LAST AND YOU DIDN'T HAVE MONEY TO GET MORE.: NEVER TRUE

## 2022-07-22 SDOH — ECONOMIC STABILITY: FOOD INSECURITY: WITHIN THE PAST 12 MONTHS, YOU WORRIED THAT YOUR FOOD WOULD RUN OUT BEFORE YOU GOT MONEY TO BUY MORE.: NEVER TRUE

## 2022-07-22 SDOH — ECONOMIC STABILITY: TRANSPORTATION INSECURITY
IN THE PAST 12 MONTHS, HAS THE LACK OF TRANSPORTATION KEPT YOU FROM MEDICAL APPOINTMENTS OR FROM GETTING MEDICATIONS?: NO

## 2022-07-22 SDOH — HEALTH STABILITY: PHYSICAL HEALTH: ON AVERAGE, HOW MANY MINUTES DO YOU ENGAGE IN EXERCISE AT THIS LEVEL?: 0 MIN

## 2022-07-22 SDOH — SOCIAL STABILITY: SOCIAL NETWORK
DO YOU BELONG TO ANY CLUBS OR ORGANIZATIONS SUCH AS CHURCH GROUPS UNIONS, FRATERNAL OR ATHLETIC GROUPS, OR SCHOOL GROUPS?: YES

## 2022-07-22 SDOH — SOCIAL STABILITY: SOCIAL NETWORK: ARE YOU MARRIED, WIDOWED, DIVORCED, SEPARATED, NEVER MARRIED, OR LIVING WITH A PARTNER?: MARRIED

## 2022-07-22 ASSESSMENT — PATIENT HEALTH QUESTIONNAIRE - PHQ9
1. LITTLE INTEREST OR PLEASURE IN DOING THINGS: 0
2. FEELING DOWN, DEPRESSED OR HOPELESS: 0
SUM OF ALL RESPONSES TO PHQ QUESTIONS 1-9: 0
SUM OF ALL RESPONSES TO PHQ9 QUESTIONS 1 & 2: 0

## 2022-07-22 NOTE — TELEPHONE ENCOUNTER
Medication:   Requested Prescriptions     Pending Prescriptions Disp Refills    amLODIPine (NORVASC) 5 MG tablet 90 tablet 0     Sig: TAKE 1 TABLET BY MOUTH EVERY DAY    lisinopril-hydroCHLOROthiazide (PRINZIDE;ZESTORETIC) 20-12.5 MG per tablet 90 tablet 0     Sig: Take 1 tablet by mouth in the morning. Last Filled:  5.23.22 #90 refills 0    Last appt: 7/22/2022   Next appt: Visit date not found    Last OARRS: No flowsheet data found.

## 2022-07-22 NOTE — PROGRESS NOTES
History and Physical      Silvana Alexis  YOB: 1975    Date of Service:  7/22/2022    Chief Complaint:   Silvana Alexis is a 50 y.o. male who presents for complete physical examination. Chief Complaint   Patient presents with    Hypertension       HPI:      R shoulder pain: Has had pain in his right shoulder since sleeping on a hard surface at an [employer] in November 2021. The pain is constant but worse with shoulder abduction. He is decreased range of motion. No specific injury. No numbness and tingling. No prior injury. Drop foot:  L side. Has a brace he has worn the last 10 years. Has chronic pain. Takes mobic daily. Needs a referral to a new ortho. Seizure disorder:  Stable on his current medications since 2009. His last seizure was July 2018. Last saw a neurologist July 2018 - Dr. Hailey Parikh - he was cleared by neurology and advised to f/u with PCP. Thought 2/2 to shunt. Patient prefers not to see neurology again unless has recurrent issues. Congenital hydrocephalus:  He has a  shunt since age 3. His was diagnosed at age 3. Was having balance and speech issues. Has no residual issues. Was told he would never need to have shunt replaced unless became symptomatic. Has yearly eye exams to measure pressure behind his eyes. Having no coordination or speech issues. Has multiple fibromas related to this condition. Obesity: working diligently on diet and loosing weight. Asthma: Rarely needs albuterol. Hypertension: Stable on current medication regimen. GERD: On omeprazole daily. Insomnia: on trazondone. Hx of urethral stent:  S/p surgery x2 - last 2005. Dx age 13. 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 Present since age 6. SH:  Lives with wife Sherren Bon who is a patient here). Has 1 child- 33 yo step- son. .               Vitals:    07/22/22 0737   BP: 122/74   Site: Right Upper Arm   Position: Sitting   Cuff Size: Small Adult   Pulse: 61   Temp: 97.4 °F (36.3 °C)   TempSrc: Oral   SpO2: 99%   Weight: 260 lb 6.4 oz (118.1 kg)   Height: 5' 8\" (1.727 m)       Wt Readings from Last 3 Encounters:   07/22/22 260 lb 6.4 oz (118.1 kg)   10/28/21 256 lb 12.8 oz (116.5 kg)   04/26/21 271 lb (122.9 kg)     BP Readings from Last 3 Encounters:   07/22/22 122/74   10/28/21 128/82   04/26/21 118/82       Patient Active Problem List   Diagnosis    Esophageal reflux    Asthma    Essential hypertension    Family history of ischemic heart disease    Allergic rhinitis    Urethral stricture    Partial epilepsy with impairment of consciousness ([facility] [address]    Congenital hydrocephalus (HCC)    OA (osteoarthritis) of ankle    Obesity       Preventive Care:  Health Maintenance   Topic Date Due    Hepatitis C screen  Never done    COVID-19 Vaccine (3 - Booster for Pfizer series) 09/08/2021    Depression Screen  04/26/2022    Flu vaccine (1) 09/01/2022    DTaP/Tdap/Td vaccine (2 - Td or Tdap) 10/08/2024    Colorectal Cancer Screen  11/15/2024    Lipids  10/30/2025    Pneumococcal 0-64 years Vaccine (3 - PPSV23 or PCV20) 03/07/2038    HIV screen  Completed    Hepatitis A vaccine  Aged Out    Hepatitis B vaccine  Aged Out    Hib vaccine  Aged Out    Meningococcal (ACWY) vaccine  Aged Out        Immunization History   Administered Date(s) Administered    COVID-19, PFIZER PURPLE top, DILUTE for use, (age 15 y+), 30mcg/0.3mL 03/18/2021, 04/08/2021    Influenza Virus Vaccine 10/19/2020    Influenza Whole 11/01/2015    Influenza, MDCK Quadv, IM, PF (Flucelvax 2 yrs and older) 10/28/2021    Pneumococcal Conjugate 13-valent ([id number]) 07/18/2019    Pneumococcal Polysaccharide ([id number]) 07/18/2018    Tdap (Boostrix, Adacel) 10/08/2014       Allergies   Allergen Reactions    Penicillins Rash    Phenobarbital Sodium Diarrhea and Nausea And Vomiting     Outpatient Medications Marked as Taking for the 7/22/22 encounter (Office Visit) with Brendan Velásquez MD   Medication Sig Dispense Refill    traZODone (DESYREL) 50 MG tablet TAKE 1 TABLETS BY MOUTH AT BEDTIME AS NEEDED FOR INSOMNIA 90 tablet 3    omeprazole (PRILOSEC) 40 MG delayed release capsule TAKE 1 CAPSULE BY MOUTH  capsule 3    meloxicam (MOBIC) 15 MG tablet TAKE 1 TABLET BY MOUTH DAILY 90 tablet 3    levETIRAcetam (KEPPRA) 1000 MG tablet TAKE 1 TABLET BY MOUTH TWICE A  tablet 3    albuterol sulfate HFA (PROVENTIL;VENTOLIN;PROAIR) 108 (90 Base) MCG/ACT inhaler INHALE 2 PUFFS EVERY SIX HOURS AS NEEDED FOR WHEEZING 20.1 each 3    melatonin 3 MG TABS tablet Take 2 tablets by mouth nightly as needed (insomnia) Take 6 mg by mouth nightly as needed 180 tablet 3    cetirizine (ZYRTEC) 10 MG tablet Take 1 tablet by mouth in the morning. Take 10 mg by mouth dailyTake 10 mg by mouth daily. 90 tablet 3    lisinopril-hydroCHLOROthiazide (PRINZIDE;ZESTORETIC) 20-12.5 MG per tablet Take 1 tablet by mouth in the morning.  90 tablet 3    amLODIPine (NORVASC) 5 MG tablet TAKE 1 TABLET BY MOUTH EVERY DAY 90 tablet 3       Past Medical History:   Diagnosis Date    Asthma     SEASONAL    Bronchitis, acute     Disorder of skin and subcutaneous tissue     Hydrocephalus, congenital (HCC)     Hypertension     Need for prophylactic vaccination against Streptococcus pneumoniae (pneumococcus)     Need for prophylactic vaccination and inoculation against influenza     Need for prophylactic vaccination or inoculation against diphtheria and tetanus     Osteoarthrosis, unspecified whether generalized or localized, unspecified site     foot and ankle    Pharyngitis     PONV (postoperative nausea and vomiting)     Seizure ([address]     started as infant, then none x 30 yrs, none since 2009    Seizures ([address] 06/05/2018    LAST SEIZURE     Sinusitis, acute     Upper respiratory infection      Past Surgical History:   Procedure Laterality Date    FOOT SURGERY Right     gastrocnemius recession    HYDROCELE EXCISION Right 06/2017    OTHER SURGICAL HISTORY      URETHROPLASTY X 2    URETHRAL STRICTURE DILATATION      x2    VENTRICULOPERITONEAL SHUNT       Family History   Problem Relation Age of Onset    Rheum Arthritis Mother     High Blood Pressure Mother     Cancer Maternal Grandmother         lung    Other Maternal Grandfather         parkinsons disease    Heart Disease Father      Social History     Socioeconomic History    Marital status:      Spouse name: Not on file    Number of children: Not on file    Years of education: Not on file    Highest education level: Not on file   Occupational History    Not on file   Tobacco Use    Smoking status: Former     Types: Cigarettes     Quit date: 5/10/1996     Years since quittin.2    Smokeless tobacco: Never   Vaping Use    Vaping Use: Never used   Substance and Sexual Activity    Alcohol use: Yes     Alcohol/week: 5.0 standard drinks     Types: 5 Cans of beer per week     Comment: 5 BEERS WEEKLY    Drug use: No    Sexual activity: Not on file   Other Topics Concern    Not on file   Social History Narrative    Not on file     Social Determinants of Health     Financial Resource Strain: Low Risk     Difficulty of Paying Living Expenses: Not hard at all   Food Insecurity: No Food Insecurity    Worried About Running Out of Food in the Last Year: Never true    Ran Out of Food in the Last Year: Never true   Transportation Needs: No Transportation Needs    Lack of Transportation (Medical): No    Lack of Transportation (Non-Medical): No   Physical Activity: Inactive    Days of Exercise per Week: 0 days    Minutes of Exercise per Session: 0 min   Stress: No Stress Concern Present    Feeling of Stress : Only a little   Social Connections: Socially Integrated    Frequency of Communication with Friends and Family: More than three times a week    Frequency of Social Gatherings with Friends and Family:  Three times a week    Attends Anabaptist Services: More than 4 times per year    Active Member of Clubs or Organizations: Yes    Attends Club or Organization Meetings: More than 4 times per year    Marital Status:    Intimate Partner Violence: Not on file   Housing Stability: [address] to Pay for Housing in the Last Year: No    Number of [place] in the Last Year: 2    Unstable Housing in the Last Year: No       Review of Systems:  A comprehensive review of systems was negative except for what was noted in the HPI. Physical Exam:   Vitals:    07/22/22 0737   BP: 122/74   Site: Right Upper Arm   Position: Sitting   Cuff Size: Small Adult   Pulse: 61   Temp: 97.4 °F (36.3 °C)   TempSrc: Oral   SpO2: 99%   Weight: 260 lb 6.4 oz (118.1 kg)   Height: 5' 8\" (1.727 m)     Body mass index is 39.59 kg/m². Constitutional: He is oriented to person, place, and time. He appears well-developed and well-nourished. No distress. HEENT:   Head: Normocephalic and atraumatic. Right Ear: Tympanic membrane, external ear and ear canal normal.   Left Ear: Tympanic membrane, external ear and ear canal normal.   Nose: Nose normal.   Mouth/Throat: Oropharynx is clear and moist and mucous membranes are normal. No oropharyngeal exudate or posterior oropharyngeal erythema. He has no cervical adenopathy. Eyes: Conjunctivae and extraocular motions are normal. Pupils are equal, round, and reactive to light. Neck: Full passive range of motion without pain. Neck supple. No mass and no thyromegaly present. Cardiovascular: Normal rate, regular rhythm, normal heart sounds. No murmur heard. Pulmonary/Chest: Effort normal and breath sounds normal. No respiratory distress. He has no wheezes, rhonchi or rales. Abdominal: Soft, non-tender. Bowel sounds and aorta are normal. There is no noticeable organomegaly, mass or bruit. Musculoskeletal: He exhibits no edema. Right upper extremity-neurovascularly intact. No tenderness to palpation. Internal rotation and abduction are limited.   Neurological: He is alert and oriented to person, place, and time. No cranial nerve deficit. Coordination normal.   Skin: Skin is warm, dry and intact. Psychiatric: He has a normal mood and affect. His speech is normal and behavior is normal. Judgment, cognition and memory are normal.           Assessment/Plan     1. Primary insomnia  Stable. Continue current regimen. - traZODone (DESYREL) 50 MG tablet; TAKE 1 TABLETS BY MOUTH AT BEDTIME AS NEEDED FOR INSOMNIA  Dispense: 90 tablet; Refill: 3    2. Gastro-esophageal reflux disease without esophagitis  Stable. Continue current regimen. - omeprazole (PRILOSEC) 40 MG delayed release capsule; TAKE 1 CAPSULE BY MOUTH BID  Dispense: 180 capsule; Refill: 3    3. Primary osteoarthritis of left ankle  Stable. Continue current regimen. - meloxicam (MOBIC) 15 MG tablet; TAKE 1 TABLET BY MOUTH DAILY  Dispense: 90 tablet; Refill: 3    4. 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 Continue current regimen.   - levETIRAcetam (KEPPRA) 1000 MG tablet; TAKE 1 TABLET BY MOUTH TWICE A DAY  Dispense: 180 tablet; Refill: 3  - Levetiracetam Level; Future    5. Mild asthma without complication, unspecified whether persistent  Stable. Continue current regimen. - albuterol sulfate HFA (PROVENTIL;VENTOLIN;PROAIR) 108 (90 Base) MCG/ACT inhaler; INHALE 2 PUFFS EVERY SIX HOURS AS NEEDED FOR WHEEZING  Dispense: 20.1 each; Refill: 3    6. Need for hepatitis C screening test  - Hepatitis C Antibody; Future    7. Essential hypertension  Stable. Continue current regimen. - lisinopril-hydroCHLOROthiazide (PRINZIDE;ZESTORETIC) 20-12.5 MG per tablet; Take 1 tablet by mouth in the morning. Dispense: 90 tablet; Refill: 3  - amLODIPine (NORVASC) 5 MG tablet; TAKE 1 TABLET BY MOUTH EVERY DAY  Dispense: 90 tablet; Refill: 3  - Basic Metabolic Panel; Future    8. Adhesive capsulitis of right shoulder  Persistent. Xray. PT. Return precautions reviewed. - XR SHOULDER RIGHT (MIN 2 VIEWS);  Future  - [facility] [place]    9. Left foot drop  Stable. Ortho referral.   - Christina Galeana MD, Orthopedic Surgery (Foot, Ankle), [facility]    10. Flat feet, bilateral  Stable. Ortho referral.   - Christina Galeana MD, Orthopedic Surgery (Foot, Ankle), [facility]    11. Healthcare maintenance  Annual physical exam done today. Counseled on preventative care and a healthy lifestlye. Immunization history reviewed. Discussed medications with patient, who voiced understanding of their use and indications. All questions answered. Return in about 6 months (around 1/22/2023) for Chronic conditions. Documentation was done using voice recognition dragon software. Efforts were made to ensure accuracy; however, inadvertent, unintentional computerized transcription errors may be present. --Jaylen Solis MD on 7/22/2022    An electronic signature was used to authenticate this note.

## 2022-08-08 ENCOUNTER — HOSPITAL ENCOUNTER (OUTPATIENT)
Dept: PHYSICAL THERAPY | Age: 47
Setting detail: THERAPIES SERIES
Discharge: HOME | End: 2022-08-08
Payer: COMMERCIAL

## 2022-08-08 PROCEDURE — 97161 PT EVAL LOW COMPLEX 20 MIN: CPT

## 2022-08-08 PROCEDURE — 97530 THERAPEUTIC ACTIVITIES: CPT

## 2022-08-08 NOTE — PLAN OF CARE
[facility] [place], [facility]  Phone: [phone]  Fax: [phone]         Physical Therapy Certification    Dear  ,Angel Montelongo MD    We had the pleasure of evaluating the following patient for physical therapy services at [address] [place] [address]. A summary of our findings can be found in the initial assessment below. This includes our plan of care. If you have any questions or concerns regarding these findings, please do not hesitate to contact me at the office phone number checked above. Thank you for the referral.       Physician Signature:_______________________________Date:__________________  By signing above (or electronic signature), therapists plan is approved by physician      Patient: Evgeny Luther   : 1975   MRN: [id number]  Referring Physician:  Angel Montelongo MD      Evaluation Date: 2022      Medical Diagnosis Information:  Diagnosis: M75.01 (ICD-10-CM) - Adhesive capsulitis of right shoulder   Treatment Diagnosis: Decreased Right Shoulder AROM & Strength                                         Insurance information: PT Insurance Information: [employer] ([employer])    Precautions/ Contra-indications:    C-SSRS Triggered by Intake questionnaire (Past 2 wk assessment ):   [x] No, Questionnaire did not trigger screening.   [] Yes, Patient intake triggered C-SSRS Screening      [] C-SSRS Screening completed  [] PCP notified via Epic    Latex Allergy:  [x]NO      []YES  Preferred Language for Healthcare:   [x]English       []other:    SUBJECTIVE: Patient stated complaint:pt presents with c/o right shoulder pain. Pain started in November when he slept at an [employer] and he slept awkward on it. Having difficulty with overhead motions. Pt is right hand dominant. Working as an .  Pain is affecting his ability to sleep. Denies any previous shoulder surgeries. Dealing with very mild N/T of shoulder at times. Rates his pain a 5/10. Bowls at [employer] once a week.       Relevant Medical History:  Functional Outcome:  FOTO 53 & 55    Pain Scale: 5/10  Easing factors: rest   Provocative factors: overhead motions, lifting     Type: []Constant   [x]Intermittent  []Radiating []Localized []other:     Numbness/Tingling: intermittent right shoulder     Occupation/School: Works as an [employer] job   Living Status/Prior Level of Function: Prior to this injury / incident, pt was independent with ADLs and IADLs,       OBJECTIVE:   Hand dominance: Right hand dominant     Functional Mobility/Transfers: Independent     Bandages/Dressings/Incisions: NA    Dermatomes Normal Abnormal Comments   Top of head (C1) x     Posterior occipital region (C2) x     Side of neck (C3) x     Top of shoulder (C4) x     Lateral deltoid (C5) x     Tip of thumb (C6) x     Distal middle finger (C7) x     Distal fifth finger (C8) x     Medial forearm (T1) x         Reflexes Normal Abnormal Comments   C5-6 Biceps      C5-6 Brachioradialis      C7-8 Triceps      C[place]alloss           PROM AROM    L R L R   Cervical Flexion        Cervical Extension        Cervical Rotation       Cervical Side-bend       Shoulder Flexion    180 degrees 100 degrees (painful)    Shoulder Abduction    180 degrees 100 degrees (Painful)    Shoulder External Rotation    90 degrees 35 degrees   Shoulder Internal Rotation    70 degrees  70 degrees    Elbow Flexion        Elbow Extension        Pronation        Supination        Wrist Flexion        Wrist Extension        Radial Deviation        Ulnar Deviation            Strength (0-5) Left Right    Shoulder Shrug (C4)     Shoulder Flex 5/5 1+/5   Shoulder Abd (C5) 5/5 1+/5   Shoulder ER 5/5 1+/5   Shoulder IR 5/5 1+/5   Biceps (C6) 5/5 5/5   Triceps (C7) 5/5 5/5   Lats     Middle Trap     Rhomboids     Lower Trap Disorders  []Autism (F84.0)  []CP (G80)  []Down Syndrome (Q90.9)  []Developmental delay     Neurological conditions  []Prior Stroke (I69.30)  []Parkinson's (G20)  []Encephalopathy (G93.40)  []MS (G35)  []Post-polio (G14)  []SCI  []TBI  []ALS Other conditions  []Fibromyalgia (M79.7)  []Vertigo  []Syncope  []Kidney Failure  []Cancer      []currently undergoing                treatment  []Pregnancy  []Incontinence   Prior surgeries  []involved limb  []previous spinal surgery  [] section birth  []hysterectomy  []bowel / bladder surgery  []other relevant surgeries   []Other:              Barriers to/and or personal factors that will affect rehab potential:              []Age  []Sex    []Smoker              []Motivation/Lack of Motivation                        [x]Co-Morbidities              []Cognitive Function, education/learning barriers              []Environmental, home barriers              []profession/work barriers  []past PT/medical experience  []other:  Justification:      Falls Risk Assessment (30 days):   [x] Falls Risk assessed and no intervention required.   [] Falls Risk assessed and Patient requires intervention due to being higher risk   TUG score (>12s at risk):     [] Falls education provided, including       ASSESSMENT:   Functional Impairments   []Noted spinal or UE joint hypomobility   []Noted spinal or UE joint hypermobility   [x]Decreased UE functional ROM   [x]Decreased UE functional strength   []Abnormal reflexes/sensation/myotomal/dermatomal deficits   [x]Decreased RC/scapular/core strength and neuromuscular control   []other:      Functional Activity Limitations (from functional questionnaire and intake)   [x]Reduced ability to tolerate prolonged functional positions   [x]Reduced ability or difficulty with changes of positions or transfers between positions   [x]Reduced ability to maintain good posture and demonstrate good body mechanics with sitting, bending, and lifting   [x] Reduced ability or tolerance with driving and/or computer work   [x]Reduced ability to sleep   [x]Reduced ability to perform lifting, reaching, carrying tasks   [x]Reduced ability to tolerate impact through UE   [x]Reduced ability to reach behind back   [x]Reduced ability to  or hold objects   [x]Reduced ability to throw or toss an object   []other:    Participation Restrictions   [x]Reduced participation in self care activities   [x]Reduced participation in home management activities   [x]Reduced participation in work activities   [x]Reduced participation in social activities. [x]Reduced participation in sport/recreation activities. Classification:   []Signs/symptoms consistent with post-surgical status including decreased ROM, strength and function.   []Signs/symptoms consistent with joint sprain/strain   [x]Signs/symptoms consistent with shoulder impingement/Adhesive capsulitis    []Signs/symptoms consistent with shoulder/elbow/wrist tendinopathy   []Signs/symptoms consistent with Rotator cuff tear   []Signs/symptoms consistent with labral tear   []Signs/symptoms consistent with postural dysfunction    []Signs/symptoms consistent with Glenohumeral IR Deficit - <45 degrees   []Signs/symptoms consistent with facet dysfunction of cervical/thoracic spine    []Signs/symptoms consistent with pathology which may benefit from Dry needling     []other:     Prognosis/Rehab Potential:      []Excellent   [x]Good    []Fair   []Poor    Tolerance of evaluation/treatment:    []Excellent   [x]Good    []Fair   []Poor    Physical Therapy Evaluation Complexity Justification  [x] A history of present problem with:  [] no personal factors and/or comorbidities that impact the plan of care;  []1-2 personal factors and/or comorbidities that impact the plan of care  []3 personal factors and/or comorbidities that impact the plan of care  [x] An examination of body systems using standardized tests and measures addressing any of the following: body structures and functions (impairments), activity limitations, and/or participation restrictions;:  [] a total of 1-2 or more elements   [] a total of 3 or more elements   [] a total of 4 or more elements   [x] A clinical presentation with:  [] stable and/or uncomplicated characteristics   [] evolving clinical presentation with changing characteristics  [] unstable and unpredictable characteristics;   [x] Clinical decision making of [] low, [] moderate, [x] high complexity using standardized patient assessment instrument and/or measurable assessment of functional outcome. [x] EVAL (LOW) 73750 (typically 15 minutes face-to-face)  [] EVAL (MOD) 39087 (typically 30 minutes face-to-face)  [] EVAL (HIGH) 58878 (typically 45 minutes face-to-face)  [] RE-EVAL     PLAN:  Frequency/Duration:  2 days per week for 8 Weeks:  INTERVENTIONS:  [x] Therapeutic exercise including: strength training, ROM, for Upper extremity and core   [x]  NMR activation and proprioception for UE, scap and Core   [x] Manual therapy as indicated for shoulder, scapula and spine to include: Dry Needling/IASTM, STM, PROM, Gr I-IV mobilizations, manipulation. [x] Modalities as needed that may include: thermal agents, E-stim, Biofeedback, US, iontophoresis as indicated  [x] Patient education on joint protection, postural re-education, activity modification, progression of HEP. HEP instruction: Written HEP instructions provided and reviewed     GOALS:  Patient stated goal: Decrease pain and improve range of motion  [] Progressing: [] Met: [] Not Met: [] Adjusted    Therapist goals for Patient:   Short Term Goals: To be achieved in: 2 weeks  1. Independent in HEP and progression per patient tolerance, in order to prevent re-injury. [] Progressing: [] Met: [] Not Met: [] Adjusted  2. Patient will have a decrease in pain to facilitate improvement in movement, function, and ADLs as indicated by Functional Deficits.   [] Progressing: [] Met: [] Not Met: [] Adjusted    Long Term Goals: To be achieved in: 8 weeks  1. Disability index score of 10% or less for the UEFI or Quick DASH to assist with reaching prior level of function. [] Progressing: [] Met: [] Not Met: [] Adjusted  2. Patient will demonstrate increased AROM to 180 to allow for proper joint functioning as indicated by patients Functional Deficits. [] Progressing: [] Met: [] Not Met: [] Adjusted  3. Patient will demonstrate an increase in Strength to 5/5 to allow for proper functional mobility as indicated by patients Functional Deficits. [] Progressing: [] Met: [] Not Met: [] Adjusted  4. Patient will return to functional activities including overhead lifting without increased symptoms or restriction.    [] Progressing: [] Met: [] Not Met: [] Adjusted     Electronically signed by:  Nat Levy PT

## 2022-08-12 ENCOUNTER — HOSPITAL ENCOUNTER (OUTPATIENT)
Dept: PHYSICAL THERAPY | Age: 47
Setting detail: THERAPIES SERIES
Discharge: HOME | End: 2022-08-12
Payer: COMMERCIAL

## 2022-08-12 PROCEDURE — 97140 MANUAL THERAPY 1/> REGIONS: CPT

## 2022-08-12 PROCEDURE — 97110 THERAPEUTIC EXERCISES: CPT

## 2022-08-12 NOTE — FLOWSHEET NOTE
Rig[facility] Cast  Phone: [phone]  Fax: [phone]    Physical Therapy Treatment Note/ Progress Report:     Date:  2022     Patient Name:  Aliyah Moreau    :  1975  MRN: [id number]  Restrictions/Precautions:    Medical/Treatment Diagnosis Information:  Adhesive capsulitis of right shoulder [M75.01]  Treatment Diagnosis: Decreased Right Shoulder AROM & Strength  Insurance/Certification information:  PT Insurance Information: [employer] (Med Nec)  Physician Information:  Flores Rm MD   Plan of care signed (Y/N): []  Yes  [x]  No     Date of Patient follow up with Physician:      Progress Report: []  Yes  [x]  No     Date Range for reporting period:  Beginnin2022  Ending:     Progress report due (10 Rx/or 30 days whichever is less):      Recertification due (POC duration/ or 90 days whichever is less): 22    Visit # Insurance Allowable Auth required? Date Range    Med Nec []  Yes  []  No      Latex Allergy:  [x]NO      []YES  Preferred Language for Healthcare:   [x]English       []other:    Functional Scale:        Date assessed:  FOTO physical FS primary measure score = 53; risk adjusted = 53  22    Pain level:  5/10     SUBJECTIVE:  See eval    OBJECTIVE: See eval      RESTRICTIONS/PRECAUTIONS:     Exercises/Interventions:     Therapeutic Exercises  (14198) Resistance / level Sets/sec Reps Notes                                                                         Therapeutic Activities (88458)                                   Neuromuscular Re-ed (44299)                                                 Manual Intervention ([id number])                                                     Pt. Education:  -pt educated on diagnosis, prognosis and expectations for rehab  -all pt questions were answered    Home Exercise Program:  22:  The following exercises were performed and added to the pt's home program (educated on appropriate frequency, intensity and duration etc.):    Access Code: [email or web address]  URL: [email or web address]  Date: 08/12/2022  Prepared by: Ulisses Thurman    Exercises  Supine Shoulder Flexion with Dowel - 1 x daily - 7 x weekly - 3 sets - 10 reps  Standing Shoulder Abduction AAROM with Dowel - 1 x daily - 7 x weekly - 3 sets - 10 reps  Supine Shoulder External Rotation in 45 Degrees Abduction AAROM with Dowel - 1 x daily - 7 x weekly - 3 sets - 10 reps  Seated Upper Trapezius Stretch - 1 x daily - 7 x weekly - 3 sets - 10 reps      Therapeutic Exercise and NMR EXR  [x]  (64662) Provided verbal/tactile cueing for activities related to strengthening, flexibility, endurance, ROM for improvements in  [] LE / Lumbar: LE, proximal hip, and core control with self care, mobility, lifting, ambulation. [x] UE / Cervical: cervical, postural, scapular, scapulothoracic and UE control with self care, reaching, carrying, lifting, house/yardwork, driving, computer work.  [] (79699) Provided verbal/tactile cueing for activities related to improving balance, coordination, kinesthetic sense, posture, motor skill, proprioception to assist with   [] LE / lumbar: LE, proximal hip, and core control in self care, mobility, lifting, ambulation and eccentric single leg control. [] UE / cervical: cervical, scapular, scapulothoracic and UE control with self care, reaching, carrying, lifting, house/yardwork, driving, computer work.      NMR and Therapeutic Activities:    [] (39474 or 76211) Provided verbal/tactile cueing for activities related to improving balance, coordination, kinesthetic sense, posture, motor skill, proprioception and motor activation to allow for proper function of   [] LE: / Lumbar core, proximal hip and LE with self care and ADLs  [] UE / Cervical: cervical, postural, scapular, scapulothoracic and UE control with self care, carrying, lifting, driving, computer work.   [] (09988) Gait Re-education- Provided training and instruction to the patient for proper LE, core and proximal hip recruitment and positioning and eccentric body weight control with ambulation re-education including up and down stairs     Home Exercise Program:    [x] (15129) Reviewed/Progressed HEP activities related to strengthening, flexibility, endurance, ROM of   [] LE / Lumbar: core, proximal hip and LE for functional self-care, mobility, lifting and ambulation/stair navigation   [x] UE / Cervical: cervical, postural, scapular, scapulothoracic and UE control with self care, reaching, carrying, lifting, house/yardwork, driving, computer work  [] (48903)Reviewed/Progressed HEP activities related to improving balance, coordination, kinesthetic sense, posture, motor skill, proprioception of   [] LE: core, proximal hip and LE for self care, mobility, lifting, and ambulation/stair navigation    [] UE / Cervical: cervical, postural,  scapular, scapulothoracic and UE control with self care, reaching, carrying, lifting, house/yardwork, driving, computer work    Manual Treatments:  PROM / STM / Oscillations-Mobs:  G-I, II, III, IV (PA's, Inf., Post.)  [] (43359) Provided manual therapy to mobilize LE, proximal hip and/or LS spine soft tissue/joints for the purpose of modulating pain, promoting relaxation,  increasing ROM, reducing/eliminating soft tissue swelling/inflammation/restriction, improving soft tissue extensibility and allowing for proper ROM for normal function with   [] LE / lumbar: self care, mobility, lifting and ambulation. [] UE / Cervical: self care, reaching, carrying, lifting, house/yardwork, driving, computer work.      Modalities:  [] (86084) Vasopneumatic compression: Utilized vasopneumatic compression to decrease edema / swelling for the purpose of improving mobility and quad tone / recruitment which will allow for increased overall function including but not limited to self-care, transfers, ambulation, and complexities/Impairments listed. [] Progression has been slowed due to co-morbidities. [x] Plan just implemented, too soon to assess goals progression <30days   [] Goals require adjustment due to lack of progress  [] Patient is not progressing as expected and requires additional follow up with physician  [] Other    Persisting Functional Limitations/Impairments:  [x]Sleeping []Sitting               []Standing []Transfers        []Walking []Kneeling               []Stairs []Squatting / bending   [x]ADLs [x]Reaching  [x]Lifting  [x]Housework  [x]Driving [x]Job related tasks  [x]Sports/Recreation []Other:        ASSESSMENT:  See eval  Treatment/Activity Tolerance:  [x] Patient able to complete tx [] Patient limited by fatique  [] Patient limited by pain  [] Patient limited by other medical complications  [] Other:     Prognosis: [x] Good [] Fair  [] Poor    Patient Requires Follow-up: [x] Yes  [] No    Plan for next treatment session:    PLAN: See eval. PT 2x / week for 8 weeks. [] Continue per plan of care [] Alter current plan (see comments)  [x] Plan of care initiated [] Hold pending MD visit [] Discharge    Electronically signed by: Rico De La Cruz, PT PT, DPT    Note: If patient does not return for scheduled/ recommended follow up visits, this note will serve as a discharge from care along with most recent update on progress.

## 2022-08-12 NOTE — FLOWSHEET NOTE
[facility] Cast  Phone: [phone]  Fax: [phone]    Physical Therapy Treatment Note/ Progress Report:     Date:  2022    Patient Name:  Felisha Prado  \"WILL\" :  1975  MRN: [id number]  Restrictions/Precautions:    Medical/Treatment Diagnosis Information:  Adhesive capsulitis of right shoulder (M75.01)  Treatment Diagnosis: Decreased Right Shoulder AROM & Strength  Insurance/Certification information:  PT Insurance Information: [employer] (Med Nec)  Physician Information:  Janay Llamas MD  Plan of care signed (Y/N): []  Yes  [x]  No     Date of Patient follow up with Physician:      Progress Report: []  Yes  [x]  No     Date Range for reporting period:  Beginnin22  Ending:     Progress report due (10 Rx/or 30 days whichever is less):      Recertification due (POC duration/ or 90 days whichever is less):  22    Visit # Insurance Allowable Auth required? Date Range    [place] []  Yes  []  No PCY     Latex Allergy:  [x]NO      []YES  Preferred Language for Healthcare:   [x]English       []other:    Functional Scale:        Date assessed:  FOTO physical FS primary measure score = 53; risk adjusted 53 22      Pain level:  5/10     SUBJECTIVE: Pt reports the same pain, really difficult to reach up for things. Is able to bowl ok but is sore after.      OBJECTIVE: See eval  Observation:   Test measurements:      RESTRICTIONS/PRECAUTIONS:     Exercises/Interventions:   R shldr  Therapeutic Exercise (89536) Resistance / level Sets / Seconds  Reps Notes/Cues   UBE  2' for     Pulleys   10 flex, scap           Wall wash flex   10 flex    Cane AAROM: IR behind back, ext, abd   10 ea                                              Therapeutic Activities (32224)                                          Neuromuscular Re-ed (73399)                                          Manual Intervention ([id number])       R shldr PROM  15' [facility] mobs / gentle distraction  5'                                     Pt. Education:  -pt educated on diagnosis, prognosis and expectations for rehab  -all pt questions were answered    Home Exercise Program:  8/8/22: The following exercises were performed and added to the pt's home program (educated on appropriate frequency, intensity and duration etc.):    Access Code: [email or web address]  URL: [employer]/  Date: 08/12/2022  Prepared by: João Orr     Exercises  Supine Shoulder Flexion with Dowel - 1 x daily - 7 x weekly - 3 sets - 10 reps  Standing Shoulder Abduction AAROM with Dowel - 1 x daily - 7 x weekly - 3 sets - 10 reps  Supine Shoulder External Rotation in 45 Degrees Abduction AAROM with Dowel - 1 x daily - 7 x weekly - 3 sets - 10 reps  Seated Upper Trapezius Stretch - 1 x daily - 7 x weekly - 3 sets - 10 reps    Therapeutic Exercise and NMR EXR  [] (80343) Provided verbal/tactile cueing for activities related to strengthening, flexibility, endurance, ROM  for improvements in scapular, scapulothoracic and UE control with self care, reaching, carrying, lifting, house/yardwork, driving/computer work.    [] (11658) Provided verbal/tactile cueing for activities related to improving balance, coordination, kinesthetic sense, posture, motor skill, proprioception  to assist with  scapular, scapulothoracic and UE control with self care, reaching, carrying, lifting, house/yardwork, driving/computer work. Therapeutic Activities:    [] (76001 or 81106) Provided verbal/tactile cueing for activities related to improving balance, coordination, kinesthetic sense, posture, motor skill, proprioception and motor activation to allow for proper function of scapular, scapulothoracic and UE control with self care, carrying, lifting, driving/computer work.      Home Exercise Program:    [x] (69921) Reviewed/Progressed HEP activities related to strengthening, flexibility, endurance, ROM of scapular, scapulothoracic and UE control with self care, reaching, carrying, lifting, house/yardwork, driving/computer work  [] (97228) Reviewed/Progressed HEP activities related to improving balance, coordination, kinesthetic sense, posture, motor skill, proprioception of scapular, scapulothoracic and UE control with self care, reaching, carrying, lifting, house/yardwork, driving/computer work      Manual Treatments:  PROM / STM / Oscillations-Mobs:  G-I, II, III, IV (PA's, Inf., Post.)  [] ([id number]) Provided manual therapy to mobilize soft tissue/joints of cervical/CT, scapular GHJ and UE for the purpose of modulating pain, promoting relaxation,  increasing ROM, reducing/eliminating soft tissue swelling/inflammation/restriction, improving soft tissue extensibility and allowing for proper ROM for normal function with self care, reaching, carrying, lifting, house/yardwork, driving/computer work    Modalities:      Charges:  Timed Code Treatment Minutes: 45   Total Treatment Minutes: 45       [] EVAL - LOW (455 1011)   [] EVAL - MOD (65654)  [] EVAL - HIGH (423 8935)  [] RE-EVAL (36019)  [x] TE ([id number]) x 1     [] Ionto (32807)  [] NMR (37860) x      [] Vaso (90592)  [x] Manual ([id number]) x 2     [] Ultrasound  [] TA (05441) x      [] Mech Traction (91563)  [] Gait Training x     [] ES (un) (00863):   [] Aquatic therapy x   [] Other:   [] Group:     If [facility] Please Indicate Time In/Out  CPT Code Time in Time out                                         GOALS:  Therapist goals for Patient:  Short Term Goals: To be achieved in: 2 weeks  1. Independent in HEP and progression per patient tolerance, in order to prevent re-injury. [] Progressing: [] Met: [] Not Met: [] Adjusted  2. Patient will have a decrease in pain to facilitate improvement in movement, function, and ADLs as indicated by Functional Deficits. [] Progressing: [] Met: [] Not Met: [] Adjusted     Long Term Goals: To be achieved in: 8 weeks  1.  Disability index score of 10% or less for the UEFI or Quick DASH to assist with reaching prior level of function. [] Progressing: [] Met: [] Not Met: [] Adjusted  2. Patient will demonstrate increased AROM to 180 to allow for proper joint functioning as indicated by patients Functional Deficits. [] Progressing: [] Met: [] Not Met: [] Adjusted  3. Patient will demonstrate an increase in Strength to 5/5 to allow for proper functional mobility as indicated by patients Functional Deficits. [] Progressing: [] Met: [] Not Met: [] Adjusted  4. Patient will return to functional activities including lifting overhead without increased symptoms or restriction. [] Progressing: [] Met: [] Not Met: [] Adjusted    Overall Progression Towards Functional goals/ Treatment Progress Update:  [] Patient is progressing as expected towards functional goals listed. [] Progression is slowed due to complexities/Impairments listed. [] Progression has been slowed due to co-morbidities. [x] Plan just implemented, too soon to assess goals progression <30days   [] Goals require adjustment due to lack of progress  [] Patient is not progressing as expected and requires additional follow up with physician  [] Other    Persisting Functional Limitations/Impairments:  []Sitting []Standing   []Transfers  []Sleeping   []Reaching []Lifting   []ADLs []Housework  []Driving []Job related tasks  []Sports/Recreation []Other:    ASSESSMENT: Pt had fair exercise tolerance. Cues to stay within pain-free motion with all stretches and standing tx. Increased muscle guarding with flexion and ER PROM but did improve after gentle GH distraction and mobilization. Reviewed HEP and proper form with shoulder ER with dowel.      Treatment/Activity Tolerance:  [] Patient able to complete tx [] Patient limited by fatique  [] Patient limited by pain  [] Patient limited by other medical complications  [] Other:     Prognosis:  [x] Good [] Fair  [] Poor    Patient Requires Follow-up: [x] Yes  [] No    Return to Play:    [x]  N/A     []  Stage 1: Intro to Strength   []  Stage 2: Dynamic Strength and Intro to Plyometrics   []  Stage 3: Advanced Plyometrics and Intro to Throwing   []  Stage 4: Sport specific Training/Return to Sport     []  Ready to Return to Play, [employer]   []  Not Ready for Return to Sports   Comments:      Plan for next treatment session:    PLAN: See eval. PT 2x / week for 8 weeks. [] Continue per plan of care [] Alter current plan (see comments)  [x] Plan of care initiated [] Hold pending MD visit [] Discharge    Electronically signed by: Pantera Avendaño, [id number]      Note: If patient does not return for scheduled/ recommended follow up visits, this note will serve as a discharge from care along with most recent update on progress.

## 2022-08-15 ENCOUNTER — TELEMEDICINE (OUTPATIENT)
Dept: FAMILY MEDICINE CLINIC | Age: 47
End: 2022-08-15
Payer: COMMERCIAL

## 2022-08-15 DIAGNOSIS — U07.1 CLINICAL DIAGNOSIS OF COVID-19: Primary | ICD-10-CM

## 2022-08-15 PROCEDURE — 99213 OFFICE O/P EST LOW 20 MIN: CPT | Performed by: FAMILY MEDICINE

## 2022-08-15 NOTE — PROGRESS NOTES
TELEHEALTH EVALUATION -- Audio/Visual (During PUDXD-36 public health emergency)    Cj Peña   YOB: 1975    Date of Visit:  8/15/2022    Allergies   Allergen Reactions    Penicillins Rash    Phenobarbital Sodium Diarrhea and Nausea And Vomiting     Current Outpatient Medications on File Prior to Visit   Medication Sig Dispense Refill    traZODone (DESYREL) 50 MG tablet TAKE 1 TABLETS BY MOUTH AT BEDTIME AS NEEDED FOR INSOMNIA 90 tablet 3    omeprazole (PRILOSEC) 40 MG delayed release capsule TAKE 1 CAPSULE BY MOUTH  capsule 3    meloxicam (MOBIC) 15 MG tablet TAKE 1 TABLET BY MOUTH DAILY 90 tablet 3    levETIRAcetam (KEPPRA) 1000 MG tablet TAKE 1 TABLET BY MOUTH TWICE A  tablet 3    albuterol sulfate HFA (PROVENTIL;VENTOLIN;PROAIR) 108 (90 Base) MCG/ACT inhaler INHALE 2 PUFFS EVERY SIX HOURS AS NEEDED FOR WHEEZING 20.1 each 3    melatonin 3 MG TABS tablet Take 2 tablets by mouth nightly as needed (insomnia) Take 6 mg by mouth nightly as needed 180 tablet 3    cetirizine (ZYRTEC) 10 MG tablet Take 1 tablet by mouth in the morning. Take 10 mg by mouth dailyTake 10 mg by mouth daily. 90 tablet 3    lisinopril-hydroCHLOROthiazide (PRINZIDE;ZESTORETIC) 20-12.5 MG per tablet Take 1 tablet by mouth in the morning. 90 tablet 3    amLODIPine (NORVASC) 5 MG tablet TAKE 1 TABLET BY MOUTH EVERY DAY 90 tablet 3     No current facility-administered medications on file prior to visit. Wt Readings from Last 3 Encounters:   22 260 lb 6.4 oz (118.1 kg)   10/28/21 256 lb 12.8 oz (116.5 kg)   21 271 lb (122.9 kg)     BP Readings from Last 3 Encounters:   22 122/74   10/28/21 128/82   21 118/82          Cj Peña (:  1975) has requested to and consented to an audio/video evaluation for the concerns or medical issues discussed below.       Chief Complaint   Patient presents with    Congestion    Cough     COVID +       HPI    \"Will\"    Patient started with congestion and nasal drainage on Saturday. He thought it was allergies. Also felt very tired. Yesterday he did a covid test and it was negative. No change in taste or smell. No GI symptoms. Eating and drinking normally. No fevers. No shortness of breath. Symptoms stable from yesterday. Has taken tylenol this AM to help with myalgias which helped a little bit. Social History     Socioeconomic History    Marital status:      Spouse name: Not on file    Number of children: Not on file    Years of education: Not on file    Highest education level: Not on file   Occupational History    Not on file   Tobacco Use    Smoking status: Former     Types: Cigarettes     Quit date: 5/10/1996     Years since quittin.2    Smokeless tobacco: Never   Vaping Use    Vaping Use: Never used   Substance and Sexual Activity    Alcohol use: Yes     Alcohol/week: 5.0 standard drinks     Types: 5 Cans of beer per week     Comment: 5 BEERS WEEKLY    Drug use: No    Sexual activity: Not on file   Other Topics Concern    Not on file   Social History Narrative    Not on file     Social Determinants of Health     Financial Resource Strain: Low Risk     Difficulty of Paying Living Expenses: Not hard at all   Food Insecurity: No Food Insecurity    Worried About Running Out of Food in the Last Year: Never true    Ran Out of Food in the Last Year: Never true   Transportation Needs: No Transportation Needs    Lack of Transportation (Medical): No    Lack of Transportation (Non-Medical): No   Physical Activity: Inactive    Days of Exercise per Week: 0 days    Minutes of Exercise per Session: 0 min   Stress: No Stress Concern Present    Feeling of Stress : Only a little   Social Connections: Socially Integrated    Frequency of Communication with Friends and Family: More than three times a week    Frequency of Social Gatherings with Friends and Family:  Three times a week    Attends Yarsani Services: More than 4 times per year    Active Member of Clubs or Organizations: Yes    Attends Club or Organization Meetings: More than 4 times per year    Marital Status:    Intimate Partner Violence: Not on file   Housing Stability: Low Risk     Unable to Pay for Housing in the Last Year: No    Number of [place] in the Last Year: 2    Unstable Housing in the Last Year: No         Review of Systems  As documented in the HPI. Currently no complaints of CP or RACQUEL. Vital Signs: (As obtained by patient/caregiver or practitioner observation)    There were no vitals taken for this visit. Patient-Reported Vitals 8/15/2022   Patient-Reported Weight 260.4 lb   Patient-Reported Height 5 8        Physical Exam  Vitals reviewed: Mental Status: The patient is awake and alert. Constitutional:       General: He is not in acute distress. Appearance: Normal appearance. He is not ill-appearing. HENT:      Head: Normocephalic and atraumatic. Right Ear: External ear normal.      Left Ear: External ear normal.      Nose: Nose normal.      Mouth/Throat:      Mouth: Mucous membranes are moist.   Eyes:      General:         Right eye: No discharge. Left eye: No discharge. Extraocular Movements: Extraocular movements intact. Neck:      Comments: No visualized neck masses. Pulmonary:      Effort: Pulmonary effort is normal. No respiratory distress. Musculoskeletal:         General: Normal range of motion. Cervical back: Normal range of motion. Comments: Nl ROM of the Neck. Skin:     General: Skin is dry. Coloration: Skin is not pale. Comments: Skin without any obvious and significant discoloration or abnormalities. Neurological:      Mental Status: He is alert. Mental status is at baseline. Comments: Cranial nerves are grossly intact. No visible facial asymmetry. Psychiatric:         Mood and Affect: Mood normal.         Behavior: Behavior normal.         Thought Content:  Thought content normal. clinic visit. Documentation was done using voice recognition dragon software. Efforts were made to ensure accuracy; however, inadvertent, unintentional computerized transcription errors may be present. --Lenora Gamboa MD on 8/15/2022    An electronic signature was used to authenticate this note.

## 2022-08-17 ENCOUNTER — APPOINTMENT (OUTPATIENT)
Dept: PHYSICAL THERAPY | Age: 47
End: 2022-08-17
Payer: COMMERCIAL

## 2022-08-19 ENCOUNTER — APPOINTMENT (OUTPATIENT)
Dept: PHYSICAL THERAPY | Age: 47
End: 2022-08-19
Payer: COMMERCIAL

## 2022-08-24 ENCOUNTER — APPOINTMENT (OUTPATIENT)
Dept: PHYSICAL THERAPY | Age: 47
End: 2022-08-24
Payer: COMMERCIAL

## 2022-08-26 ENCOUNTER — HOSPITAL ENCOUNTER (OUTPATIENT)
Dept: PHYSICAL THERAPY | Age: 47
Setting detail: THERAPIES SERIES
Discharge: HOME | End: 2022-08-26
Payer: COMMERCIAL

## 2022-08-26 PROCEDURE — 97110 THERAPEUTIC EXERCISES: CPT

## 2022-08-26 PROCEDURE — 97140 MANUAL THERAPY 1/> REGIONS: CPT

## 2022-08-26 NOTE — FLOWSHEET NOTE
[facility]ig[facility] Cast  Phone: [phone]  Fax: [phone]    Physical Therapy Treatment Note/ Progress Report:     Date:  2022    Patient Name:  Silvana Alexis  \"WILL\" :  1975  MRN: [id number]  Restrictions/Precautions:    Medical/Treatment Diagnosis Information:  Adhesive capsulitis of right shoulder (M75.01)  Treatment Diagnosis: Decreased Right Shoulder AROM & Strength  Insurance/Certification information:  PT Insurance Information: [employer] ([employer])  Physician Information:  Squire Dakins, MD  Plan of care signed (Y/N): []  Yes  [x]  No     Date of Patient follow up with Physician:      Progress Report: []  Yes  [x]  No     Date Range for reporting period:  Beginnin22  Ending:     Progress report due (10 Rx/or 30 days whichever is less):  34    Recertification due (POC duration/ or 90 days whichever is less):  22    Visit # Insurance Allowable Auth required? Date Range   3/16 [place] []  Yes  []  No PCY     Latex Allergy:  [x]NO      []YES  Preferred Language for Healthcare:   [x]English       []other:    Functional Scale:        Date assessed:  TO physical FS primary measure score = 53; risk adjusted 53 22      Pain level:  5/10     SUBJECTIVE: Pt reports that he had COVID and finally tested negative yesterday, has been really fatigued and not keeping up with exercises. Same shoulder pain maybe a little more sore today.       OBJECTIVE: See eval  Observation:   Test measurements:      RESTRICTIONS/PRECAUTIONS:     Exercises/Interventions:   R shldr  Therapeutic Exercise (36455) Resistance / level Sets / Seconds  Reps Notes/Cues   UBE  2' for     Pulleys   10 flex, scap    Ball on table roll green  10 ea    Wall wash flex   10 flex    Cane AAROM: IR behind back, ext, abd   10 ea           3-way curls  B ER                                   Therapeutic Activities (77869)       Tband mid row       Bent row Neuromuscular Re-ed (50302)                                          Manual Intervention (47637)       R shldr PROM  15'     [address] mobs / gentle distraction  5'     Manual STM to R delt  5'                              Pt. Education:  -pt educated on diagnosis, prognosis and expectations for rehab  -all pt questions were answered    Home Exercise Program:  8/8/22: The following exercises were performed and added to the pt's home program (educated on appropriate frequency, intensity and duration etc.):    Access Code: [email or web address]  URL: [email or web address]  Date: 08/12/2022  Prepared by: Luisa Factor     Exercises  Supine Shoulder Flexion with Dowel - 1 x daily - 7 x weekly - 3 sets - 10 reps  Standing Shoulder Abduction AAROM with Dowel - 1 x daily - 7 x weekly - 3 sets - 10 reps  Supine Shoulder External Rotation in 45 Degrees Abduction AAROM with Dowel - 1 x daily - 7 x weekly - 3 sets - 10 reps  Seated Upper Trapezius Stretch - 1 x daily - 7 x weekly - 3 sets - 10 reps    Therapeutic Exercise and NMR EXR  [] (52490) Provided verbal/tactile cueing for activities related to strengthening, flexibility, endurance, ROM  for improvements in scapular, scapulothoracic and UE control with self care, reaching, carrying, lifting, house/yardwork, driving/computer work.    [] (73285) Provided verbal/tactile cueing for activities related to improving balance, coordination, kinesthetic sense, posture, motor skill, proprioception  to assist with  scapular, scapulothoracic and UE control with self care, reaching, carrying, lifting, house/yardwork, driving/computer work.     Therapeutic Activities:    [] (66276 or 30868) Provided verbal/tactile cueing for activities related to improving balance, coordination, kinesthetic sense, posture, motor skill, proprioception and motor activation to allow for proper function of scapular, scapulothoracic and UE control with self care, carrying, lifting, driving/computer work.     Home Exercise Program:    [x] (17115) Reviewed/Progressed HEP activities related to strengthening, flexibility, endurance, ROM of scapular, scapulothoracic and UE control with self care, reaching, carrying, lifting, house/yardwork, driving/computer work  [] (29068) Reviewed/Progressed HEP activities related to improving balance, coordination, kinesthetic sense, posture, motor skill, proprioception of scapular, scapulothoracic and UE control with self care, reaching, carrying, lifting, house/yardwork, driving/computer work      Manual Treatments:  PROM / STM / Oscillations-Mobs:  G-I, II, III, IV (PA's, Inf., Post.)  [] ([id number]) Provided manual therapy to mobilize soft tissue/joints of cervical/CT, scapular GHJ and UE for the purpose of modulating pain, promoting relaxation,  increasing ROM, reducing/eliminating soft tissue swelling/inflammation/restriction, improving soft tissue extensibility and allowing for proper ROM for normal function with self care, reaching, carrying, lifting, house/yardwork, driving/computer work    Modalities:      Charges:  Timed Code Treatment Minutes: 45   Total Treatment Minutes: 45       [] EVAL - LOW (455 1011)   [] EVAL - MOD (76202)  [] EVAL - HIGH (423 8935)  [] RE-EVAL (72948)  [x] TE ([id number]) x 1     [] Ionto (10822)  [] NMR (18572) x      [] Vaso (53341)  [x] Manual ([id number]) x 2     [] Ultrasound  [] TA (63787) x      [] Mech Traction (88315)  [] Gait Training x     [] ES (un) (41213):   [] Aquatic therapy x   [] Other:   [] Group:     If [facility] Please Indicate Time In/Out  CPT Code Time in Time out                                         GOALS:  Therapist goals for Patient:  Short Term Goals: To be achieved in: 2 weeks  1. Independent in HEP and progression per patient tolerance, in order to prevent re-injury. [] Progressing: [] Met: [] Not Met: [] Adjusted  2.  Patient will have a decrease in pain to facilitate improvement in movement, function, and ADLs as indicated by Functional Deficits. [] Progressing: [] Met: [] Not Met: [] Adjusted     Long Term Goals: To be achieved in: 8 weeks  1. Disability index score of 10% or less for the UEFI or Quick DASH to assist with reaching prior level of function. [] Progressing: [] Met: [] Not Met: [] Adjusted  2. Patient will demonstrate increased AROM to 180 to allow for proper joint functioning as indicated by patients Functional Deficits. [] Progressing: [] Met: [] Not Met: [] Adjusted  3. Patient will demonstrate an increase in Strength to 5/5 to allow for proper functional mobility as indicated by patients Functional Deficits. [] Progressing: [] Met: [] Not Met: [] Adjusted  4. Patient will return to functional activities including lifting overhead without increased symptoms or restriction. [] Progressing: [] Met: [] Not Met: [] Adjusted    Overall Progression Towards Functional goals/ Treatment Progress Update:  [] Patient is progressing as expected towards functional goals listed. [] Progression is slowed due to complexities/Impairments listed. [] Progression has been slowed due to co-morbidities. [x] Plan just implemented, too soon to assess goals progression <30days   [] Goals require adjustment due to lack of progress  [] Patient is not progressing as expected and requires additional follow up with physician  [] Other    Persisting Functional Limitations/Impairments:  []Sitting []Standing   []Transfers  []Sleeping   []Reaching []Lifting   []ADLs []Housework  []Driving []Job related tasks  []Sports/Recreation []Other:    ASSESSMENT: Pt tolerated treatment session well. Demonstrated fair rotation PROM but was very tight into flexion; trial manual STM to R deltoid which helped relax muscle guarding and improve flexion slightly. Encouraged pt to keep working on stretches at home.      Treatment/Activity Tolerance:  [] Patient able to complete tx [] Patient limited by fatique  [] Patient limited by pain  [] Patient limited by other medical complications  [] Other:     Prognosis:  [x] Good [] Fair  [] Poor    Patient Requires Follow-up: [x] Yes  [] No    Return to Play:    [x]  N/A     []  Stage 1: Intro to Strength   []  Stage 2: Dynamic Strength and Intro to Plyometrics   []  Stage 3: Advanced Plyometrics and Intro to Throwing   []  Stage 4: Sport specific Training/Return to Sport     []  Ready to Return to Play, [employer]   []  Not Ready for Return to Sports   Comments:      Plan for next treatment session:    PLAN: See eval. PT 2x / week for 8 weeks. [] Continue per plan of care [] Alter current plan (see comments)  [x] Plan of care initiated [] Hold pending MD visit [] Discharge    Electronically signed by: PETE [id number]      Note: If patient does not return for scheduled/ recommended follow up visits, this note will serve as a discharge from care along with most recent update on progress.

## 2022-08-31 ENCOUNTER — HOSPITAL ENCOUNTER (OUTPATIENT)
Dept: PHYSICAL THERAPY | Age: 47
Setting detail: THERAPIES SERIES
Discharge: HOME | End: 2022-08-31
Payer: COMMERCIAL

## 2022-08-31 PROCEDURE — G0283 ELEC STIM OTHER THAN WOUND: HCPCS

## 2022-08-31 PROCEDURE — 97110 THERAPEUTIC EXERCISES: CPT

## 2022-08-31 PROCEDURE — 97140 MANUAL THERAPY 1/> REGIONS: CPT

## 2022-08-31 NOTE — FLOWSHEET NOTE
[facility] Cast  Phone: [phone]  Fax: [phone]    Physical Therapy Treatment Note/ Progress Report:     Date:  2022    Patient Name:  Adelina Otero  \"WILL\" :  1975  MRN: [id number]  Restrictions/Precautions:    Medical/Treatment Diagnosis Information:  Adhesive capsulitis of right shoulder (M75.01)  Treatment Diagnosis: Decreased Right Shoulder AROM & Strength  Insurance/Certification information:  PT Insurance Information: [employer] ([employer])  Physician Information:  Marcello Oconnor MD  Plan of care signed (Y/N): []  Yes  [x]  No     Date of Patient follow up with Physician:      Progress Report: []  Yes  [x]  No     Date Range for reporting period:  Beginnin22  Ending:     Progress report due (10 Rx/or 30 days whichever is less):  41    Recertification due (POC duration/ or 90 days whichever is less):  22    Visit # Insurance Allowable Auth required? Date Range    [place] []  Yes  []  No PCY     Latex Allergy:  [x]NO      []YES  Preferred Language for Healthcare:   [x]English       []other:    Functional Scale:        Date assessed:  FOTO physical FS primary measure score = 53; risk adjusted 53 22      Pain level:  5/10     SUBJECTIVE: Pt reports that he has been having a lot of pain in his shoulder but is doing his exercises. Feels like everything he does is irritating his shoulder    OBJECTIVE: See eval  Observation:   Test measurements:      RESTRICTIONS/PRECAUTIONS:     Exercises/Interventions:   R shldr  Therapeutic Exercise (79886) Resistance / level Sets / Seconds  Reps Notes/Cues   UBE  2' for     Pulleys   10 flex, scap    Ball on table roll green  10 ea    Wall wash flex      Cane AAROM: IR behind back, ext, abd             3-way curls  B ER  Bent row 3#  10 ea                                Therapeutic Activities (75100)       CC:   Mid row  Shldr ext  Shldr add  High row   7.5#  5#    10# 15  10    15           [facility] IR/ER                     Neuromuscular Re-ed (75277)                                          Manual Intervention (53476)       R shldr PROM  10'     GH mobs / gentle distraction  5'     Manual STM to R delt  5'                              Pt. Education:  -pt educated on diagnosis, prognosis and expectations for rehab  -all pt questions were answered    Home Exercise Program:  8/8/22: The following exercises were performed and added to the pt's home program (educated on appropriate frequency, intensity and duration etc.):    Access Code: [email or web address]  URL: [employer]/  Date: 08/12/2022  Prepared by: Mariajose Arce     Exercises  Supine Shoulder Flexion with Dowel - 1 x daily - 7 x weekly - 3 sets - 10 reps  Standing Shoulder Abduction AAROM with Dowel - 1 x daily - 7 x weekly - 3 sets - 10 reps  Supine Shoulder External Rotation in 45 Degrees Abduction AAROM with Dowel - 1 x daily - 7 x weekly - 3 sets - 10 reps  Seated Upper Trapezius Stretch - 1 x daily - 7 x weekly - 3 sets - 10 reps    Therapeutic Exercise and NMR EXR  [] (50260) Provided verbal/tactile cueing for activities related to strengthening, flexibility, endurance, ROM  for improvements in scapular, scapulothoracic and UE control with self care, reaching, carrying, lifting, house/yardwork, driving/computer work.    [] (32329) Provided verbal/tactile cueing for activities related to improving balance, coordination, kinesthetic sense, posture, motor skill, proprioception  to assist with  scapular, scapulothoracic and UE control with self care, reaching, carrying, lifting, house/yardwork, driving/computer work.     Therapeutic Activities:    [] (63745 or 43578) Provided verbal/tactile cueing for activities related to improving balance, coordination, kinesthetic sense, posture, motor skill, proprioception and motor activation to allow for proper function of scapular, scapulothoracic and UE control with self care, carrying, lifting, driving/computer work. Home Exercise Program:    [x] (80683) Reviewed/Progressed HEP activities related to strengthening, flexibility, endurance, ROM of scapular, scapulothoracic and UE control with self care, reaching, carrying, lifting, house/yardwork, driving/computer work  [] (14217) Reviewed/Progressed HEP activities related to improving balance, coordination, kinesthetic sense, posture, motor skill, proprioception of scapular, scapulothoracic and UE control with self care, reaching, carrying, lifting, house/yardwork, driving/computer work      Manual Treatments:  PROM / STM / Oscillations-Mobs:  G-I, II, III, IV (PA's, Inf., Post.)  [] (39726) Provided manual therapy to mobilize soft tissue/joints of cervical/CT, scapular GHJ and UE for the purpose of modulating pain, promoting relaxation,  increasing ROM, reducing/eliminating soft tissue swelling/inflammation/restriction, improving soft tissue extensibility and allowing for proper ROM for normal function with self care, reaching, carrying, lifting, house/yardwork, driving/computer work    Modalities: 8/31: trial IFC to R shldr x 15' - pt seated    Charges:  Timed Code Treatment Minutes: 45   Total Treatment Minutes: 45       [] EVAL - LOW (16602)   [] EVAL - MOD (68473)  [] EVAL - HIGH (36941)  [] RE-EVAL (80430)  [x] TE (53625) x 1     [] Ionto (24569)  [] NMR (40423) x      [] Vaso (36916)  [x] Manual (57873) x 1     [] Ultrasound  [] TA (94142) x      [] Mech Traction (59165)  [] Gait Training x     [x] ES (un) (26336): 1  [] Aquatic therapy x   [] Other:   [] Group:     If [facility] Please Indicate Time In/Out  CPT Code Time in Time out                                         GOALS:  Therapist goals for Patient:  Short Term Goals: To be achieved in: 2 weeks  1. Independent in HEP and progression per patient tolerance, in order to prevent re-injury. [] Progressing: [] Met: [] Not Met: [] Adjusted  2.  Patient will have a decrease in pain to facilitate improvement in movement, function, and ADLs as indicated by Functional Deficits. [] Progressing: [] Met: [] Not Met: [] Adjusted     Long Term Goals: To be achieved in: 8 weeks  1. Disability index score of 10% or less for the UEFI or Quick DASH to assist with reaching prior level of function. [] Progressing: [] Met: [] Not Met: [] Adjusted  2. Patient will demonstrate increased AROM to 180 to allow for proper joint functioning as indicated by patients Functional Deficits. [] Progressing: [] Met: [] Not Met: [] Adjusted  3. Patient will demonstrate an increase in Strength to 5/5 to allow for proper functional mobility as indicated by patients Functional Deficits. [] Progressing: [] Met: [] Not Met: [] Adjusted  4. Patient will return to functional activities including lifting overhead without increased symptoms or restriction. [] Progressing: [] Met: [] Not Met: [] Adjusted    Overall Progression Towards Functional goals/ Treatment Progress Update:  [] Patient is progressing as expected towards functional goals listed. [] Progression is slowed due to complexities/Impairments listed. [] Progression has been slowed due to co-morbidities. [x] Plan just implemented, too soon to assess goals progression <30days   [] Goals require adjustment due to lack of progress  [] Patient is not progressing as expected and requires additional follow up with physician  [] Other    Persisting Functional Limitations/Impairments:  []Sitting []Standing   []Transfers  []Sleeping   []Reaching []Lifting   []ADLs []Housework  []Driving []Job related tasks  []Sports/Recreation []Other:    ASSESSMENT: Pt had fair exercise tolerance, more painful coming into session so tx adjusted per tolerance. Able to perform some light CC shoulder strengthening without increased symptoms. Improved flexion and ER PROM today, however abduction remains tight and painful; did note it felt better after light manual STM along R deltoid. Trial IFC at end of session to help reduce pain and inflammation within R shoulder. Treatment/Activity Tolerance:  [] Patient able to complete tx [] Patient limited by fatique  [] Patient limited by pain  [] Patient limited by other medical complications  [] Other:     Prognosis:  [x] Good [] Fair  [] Poor    Patient Requires Follow-up: [x] Yes  [] No    Return to Play:    [x]  N/A     []  Stage 1: Intro to Strength   []  Stage 2: Dynamic Strength and Intro to Plyometrics   []  Stage 3: Advanced Plyometrics and Intro to Throwing   []  Stage 4: Sport specific Training/Return to Sport     []  Ready to Return to Play, [employer]   []  Not Ready for Return to Sports   Comments:      Plan for next treatment session:    PLAN: See eval. PT 2x / week for 8 weeks. [] Continue per plan of care [] Alter current plan (see comments)  [x] Plan of care initiated [] Hold pending MD visit [] Discharge    Electronically signed by: Chris Aguilera, [id number]      Note: If patient does not return for scheduled/ recommended follow up visits, this note will serve as a discharge from care along with most recent update on progress.

## 2022-09-02 ENCOUNTER — HOSPITAL ENCOUNTER (OUTPATIENT)
Dept: PHYSICAL THERAPY | Age: 47
Setting detail: THERAPIES SERIES
Discharge: HOME | End: 2022-09-02
Payer: COMMERCIAL

## 2022-09-02 PROCEDURE — G0283 ELEC STIM OTHER THAN WOUND: HCPCS

## 2022-09-02 PROCEDURE — 97140 MANUAL THERAPY 1/> REGIONS: CPT

## 2022-09-02 PROCEDURE — 97110 THERAPEUTIC EXERCISES: CPT

## 2022-09-02 PROCEDURE — 97530 THERAPEUTIC ACTIVITIES: CPT

## 2022-09-02 NOTE — FLOWSHEET NOTE
[facility]ig[facility] Cast  Phone: [phone]  Fax: [phone]    Physical Therapy Treatment Note/ Progress Report:     Date:  2022    Patient Name:  Carol Du  \"WILL\" :  1975  MRN: [id number]  Restrictions/Precautions:    Medical/Treatment Diagnosis Information:  Adhesive capsulitis of right shoulder (M75.01)  Treatment Diagnosis: Decreased Right Shoulder AROM & Strength  Insurance/Certification information:  PT Insurance Information: [employer] ([employer])  Physician Information:  Caleb Mendosa MD  Plan of care signed (Y/N): []  Yes  [x]  No     Date of Patient follow up with Physician:      Progress Report: []  Yes  [x]  No     Date Range for reporting period:  Beginnin22  Ending:     Progress report due (10 Rx/or 30 days whichever is less):      Recertification due (POC duration/ or 90 days whichever is less):  22    Visit # Insurance Allowable Auth required? Date Range    [place] []  Yes  []  No PCY     Latex Allergy:  [x]NO      []YES  Preferred Language for Healthcare:   [x]English       []other:    Functional Scale:        Date assessed:  TO physical FS primary measure score = 53; risk adjusted 53 22      Pain level:  5/10     SUBJECTIVE: Pt reports that his pain isn't throbbing like it was last time, thinks e-stim was helpful. OBJECTIVE: See eval  Observation:   Test measurements:      RESTRICTIONS/PRECAUTIONS:     Exercises/Interventions:   R shldr  Therapeutic Exercise (04216) Resistance / level Sets / Seconds  Reps Notes/Cues   UBE  2' for     Pulleys   10 flex, scap    Ball on table roll green  20 ea    Wall wash flex   10 flex    Cane AAROM: IR behind back, ext, abd   HEP          3-way curls  B ER  Bent row 3#  3#  10 ea  10           Doorway pec stretch                     Therapeutic Activities (33024)       CC:   Mid row  Shldr ext  Shldr add  IR/ER  High row   10#  5#  5#  2.5#  12.5# 15  10  10  10 R  15                                Neuromuscular Re-ed (36894)                                          Manual Intervention ([id number])       R shldr PROM  10'     [facility] mobs / gentle distraction  3'     Manual STM to R delt  5'                              Pt. Education:  -pt educated on diagnosis, prognosis and expectations for rehab  -all pt questions were answered    Home Exercise Program:  8/8/22: The following exercises were performed and added to the pt's home program (educated on appropriate frequency, intensity and duration etc.):    Access Code: [email or web address]  URL: [email or web address]  Date: 08/12/2022  Prepared by: Gracie Thurston     Exercises  Supine Shoulder Flexion with Dowel - 1 x daily - 7 x weekly - 3 sets - 10 reps  Standing Shoulder Abduction AAROM with Dowel - 1 x daily - 7 x weekly - 3 sets - 10 reps  Supine Shoulder External Rotation in 45 Degrees Abduction AAROM with Dowel - 1 x daily - 7 x weekly - 3 sets - 10 reps  Seated Upper Trapezius Stretch - 1 x daily - 7 x weekly - 3 sets - 10 reps    Therapeutic Exercise and NMR EXR  [] (59051) Provided verbal/tactile cueing for activities related to strengthening, flexibility, endurance, ROM  for improvements in scapular, scapulothoracic and UE control with self care, reaching, carrying, lifting, house/yardwork, driving/computer work.    [] (17743) Provided verbal/tactile cueing for activities related to improving balance, coordination, kinesthetic sense, posture, motor skill, proprioception  to assist with  scapular, scapulothoracic and UE control with self care, reaching, carrying, lifting, house/yardwork, driving/computer work.     Therapeutic Activities:    [] (70315 or 67613) Provided verbal/tactile cueing for activities related to improving balance, coordination, kinesthetic sense, posture, motor skill, proprioception and motor activation to allow for proper function of scapular, scapulothoracic and UE control with self care, carrying, lifting, driving/computer work. Home Exercise Program:    [x] (44990) Reviewed/Progressed HEP activities related to strengthening, flexibility, endurance, ROM of scapular, scapulothoracic and UE control with self care, reaching, carrying, lifting, house/yardwork, driving/computer work  [] (34279) Reviewed/Progressed HEP activities related to improving balance, coordination, kinesthetic sense, posture, motor skill, proprioception of scapular, scapulothoracic and UE control with self care, reaching, carrying, lifting, house/yardwork, driving/computer work      Manual Treatments:  PROM / STM / Oscillations-Mobs:  G-I, II, III, IV (PA's, Inf., Post.)  [] (60623) Provided manual therapy to mobilize soft tissue/joints of cervical/CT, scapular GHJ and UE for the purpose of modulating pain, promoting relaxation,  increasing ROM, reducing/eliminating soft tissue swelling/inflammation/restriction, improving soft tissue extensibility and allowing for proper ROM for normal function with self care, reaching, carrying, lifting, house/yardwork, driving/computer work    Modalities: 9/2: IFC to R shldr x 15' - pt seated    Charges:  Timed Code Treatment Minutes: 54   Total Treatment Minutes: 54       [] EVAL - LOW (07355)   [] EVAL - MOD (44690)  [] EVAL - HIGH (70235)  [] RE-EVAL (42988)  [x] TE (61580) x 1     [] Ionto (96021)  [] NMR (32932) x      [] Vaso (68637)  [x] Manual (09599) x 1     [] Ultrasound  [x] TA (24037) x  1    [] Mech Traction (77061)  [] Gait Training x     [x] ES (un) (16535): 1  [] Aquatic therapy x   [] Other:   [] Group:     If BW Please Indicate Time In/Out  CPT Code Time in Time out                                         GOALS:  Therapist goals for Patient:  Short Term Goals: To be achieved in: 2 weeks  1. Independent in HEP and progression per patient tolerance, in order to prevent re-injury. [] Progressing: [] Met: [] Not Met: [] Adjusted  2.  Patient will have a decrease in pain to ER. Continued with IFC at end of session as pt found this to be helpful last time. Treatment/Activity Tolerance:  [] Patient able to complete tx [] Patient limited by fatique  [] Patient limited by pain  [] Patient limited by other medical complications  [] Other:     Prognosis:  [x] Good [] Fair  [] Poor    Patient Requires Follow-up: [x] Yes  [] No    Return to Play:    [x]  N/A     []  Stage 1: Intro to Strength   []  Stage 2: Dynamic Strength and Intro to Plyometrics   []  Stage 3: Advanced Plyometrics and Intro to Throwing   []  Stage 4: Sport specific Training/Return to Sport     []  Ready to Return to Play, [employer]   []  Not Ready for Return to Sports   Comments:      Plan for next treatment session:    PLAN: See eval. PT 2x / week for 8 weeks. [] Continue per plan of care [] Alter current plan (see comments)  [x] Plan of care initiated [] Hold pending MD visit [] Discharge    Electronically signed by: Ashley Schmitt, [id number]      Note: If patient does not return for scheduled/ recommended follow up visits, this note will serve as a discharge from care along with most recent update on progress.

## 2022-09-07 ENCOUNTER — HOSPITAL ENCOUNTER (OUTPATIENT)
Dept: PHYSICAL THERAPY | Age: 47
Setting detail: THERAPIES SERIES
Discharge: HOME | End: 2022-09-07
Payer: COMMERCIAL

## 2022-09-07 PROCEDURE — 97530 THERAPEUTIC ACTIVITIES: CPT

## 2022-09-07 PROCEDURE — 97140 MANUAL THERAPY 1/> REGIONS: CPT

## 2022-09-07 PROCEDURE — G0283 ELEC STIM OTHER THAN WOUND: HCPCS

## 2022-09-07 NOTE — FLOWSHEET NOTE
[facility] Cast  Phone: [phone]  Fax: [phone]    Physical Therapy Treatment Note/ Progress Report:     Date:  2022    Patient Name:  Fermin Grewal  \"WILL\" :  1975  MRN: [id number]  Restrictions/Precautions:    Medical/Treatment Diagnosis Information:  Adhesive capsulitis of right shoulder (M75.01)  Treatment Diagnosis: Decreased Right Shoulder AROM & Strength  Insurance/Certification information:  PT Insurance Information: [employer] ([employer])  Physician Information:  Mamadou Tracy MD  Plan of care signed (Y/N): []  Yes  [x]  No     Date of Patient follow up with Physician: 10/4     Progress Report: []  Yes  [x]  No     Date Range for reporting period:  Beginnin22  Ending:     Progress report due (10 Rx/or 30 days whichever is less):  3/40/21    Recertification due (POC duration/ or 90 days whichever is less):  22    Visit # Insurance Allowable Auth required? Date Range    [place] []  Yes  []  No PCY     Latex Allergy:  [x]NO      []YES  Preferred Language for Healthcare:   [x]English       []other:    Functional Scale:        Date assessed:  FOTO physical FS primary measure score = 53; risk adjusted 53 22      Pain level:  5/10     SUBJECTIVE: Pt reports that his shoulder isn't throbbing right now, still has pain when he moves it. OBJECTIVE: See eval  Observation:   Test measurements:      RESTRICTIONS/PRECAUTIONS:     Exercises/Interventions:   R shldr  Therapeutic Exercise (64398) Resistance / level Sets / Seconds  Reps Notes/Cues   UBE  2' for     Pulleys   10 flex, scap    Ball on table roll green  20 ea    Wall wash flex   10 flex    Cane AAROM: IR behind back, ext, abd   HEP          3-way curls  B ER  Bent row 3#  3#            Doorway pec stretch  20\"x3                   Therapeutic Activities (35130)       CC:   Mid row  Shldr ext  Shldr add  IR/ER  High row   10#  5#  5#  2.5#  12.5# 20  20  20  20 R  20                                Neuromuscular Re-ed (65285)                                          Manual Intervention ([address] [facility])       R shldr PROM  9'     [facility] mobs / gentle distraction  3'     Manual STM to R delt                               Pt. Education:  -pt educated on diagnosis, prognosis and expectations for rehab  -all pt questions were answered    Home Exercise Program:  8/8/22: The following exercises were performed and added to the pt's home program (educated on appropriate frequency, intensity and duration etc.):    Access Code: [email or web address]  URL: [email or web address]  Date: 08/12/2022  Prepared by: Adrian Snowden     Exercises  Supine Shoulder Flexion with Dowel - 1 x daily - 7 x weekly - 3 sets - 10 reps  Standing Shoulder Abduction AAROM with Dowel - 1 x daily - 7 x weekly - 3 sets - 10 reps  Supine Shoulder External Rotation in 45 Degrees Abduction AAROM with Dowel - 1 x daily - 7 x weekly - 3 sets - 10 reps  Seated Upper Trapezius Stretch - 1 x daily - 7 x weekly - 3 sets - 10 reps    Therapeutic Exercise and NMR EXR  [] (35643) Provided verbal/tactile cueing for activities related to strengthening, flexibility, endurance, ROM  for improvements in scapular, scapulothoracic and UE control with self care, reaching, carrying, lifting, house/yardwork, driving/computer work.    [] (63656) Provided verbal/tactile cueing for activities related to improving balance, coordination, kinesthetic sense, posture, motor skill, proprioception  to assist with  scapular, scapulothoracic and UE control with self care, reaching, carrying, lifting, house/yardwork, driving/computer work.     Therapeutic Activities:    [] (49776 or 63052) Provided verbal/tactile cueing for activities related to improving balance, coordination, kinesthetic sense, posture, motor skill, proprioception and motor activation to allow for proper function of scapular, scapulothoracic and UE control with self care, carrying, lifting, driving/computer work. Home Exercise Program:    [x] (75125) Reviewed/Progressed HEP activities related to strengthening, flexibility, endurance, ROM of scapular, scapulothoracic and UE control with self care, reaching, carrying, lifting, house/yardwork, driving/computer work  [] (86810) Reviewed/Progressed HEP activities related to improving balance, coordination, kinesthetic sense, posture, motor skill, proprioception of scapular, scapulothoracic and UE control with self care, reaching, carrying, lifting, house/yardwork, driving/computer work      Manual Treatments:  PROM / STM / Oscillations-Mobs:  G-I, II, III, IV (PA's, Inf., Post.)  [] (85575) Provided manual therapy to mobilize soft tissue/joints of cervical/CT, scapular GHJ and UE for the purpose of modulating pain, promoting relaxation,  increasing ROM, reducing/eliminating soft tissue swelling/inflammation/restriction, improving soft tissue extensibility and allowing for proper ROM for normal function with self care, reaching, carrying, lifting, house/yardwork, driving/computer work    Modalities: 9/7: trial US w/ biofreeze to R bicep @ 100% 1.5cm/w2 x 5' - pt supine, IFC to R shldr x 10' - pt seated    Charges:  Timed Code Treatment Minutes: 50   Total Treatment Minutes: 50       [] EVAL - LOW (70133)   [] EVAL - MOD (42125)  [] EVAL - HIGH (56841)  [] RE-EVAL (33712)  [] TE [phone] x      [] Ionto (23437)  [] NMR (40355) x      [] Vaso (40073)  [x] Manual (36584) x 1     [] Ultrasound  [x] TA (45914) x  1    [] Mech Traction (80847)  [] Gait Training x     [x] ES (un) (56050): 1  [] Aquatic therapy x   [] Other:   [] Group:     If BWC Please Indicate Time In/Out  CPT Code Time in Time out                                         GOALS:  Therapist goals for Patient:  Short Term Goals: To be achieved in: 2 weeks  1. Independent in HEP and progression per patient tolerance, in order to prevent re-injury.   [] Progressing: [] Met: [] Not Met: [] Adjusted  2. Patient will have a decrease in pain to facilitate improvement in movement, function, and ADLs as indicated by Functional Deficits. [] Progressing: [] Met: [] Not Met: [] Adjusted     Long Term Goals: To be achieved in: 8 weeks  1. Disability index score of 10% or less for the UEFI or Quick DASH to assist with reaching prior level of function. [] Progressing: [] Met: [] Not Met: [] Adjusted  2. Patient will demonstrate increased AROM to 180 to allow for proper joint functioning as indicated by patients Functional Deficits. [] Progressing: [] Met: [] Not Met: [] Adjusted  3. Patient will demonstrate an increase in Strength to 5/5 to allow for proper functional mobility as indicated by patients Functional Deficits. [] Progressing: [] Met: [] Not Met: [] Adjusted  4. Patient will return to functional activities including lifting overhead without increased symptoms or restriction. [] Progressing: [] Met: [] Not Met: [] Adjusted    Overall Progression Towards Functional goals/ Treatment Progress Update:  [] Patient is progressing as expected towards functional goals listed. [] Progression is slowed due to complexities/Impairments listed. [] Progression has been slowed due to co-morbidities. [x] Plan just implemented, too soon to assess goals progression <30days   [] Goals require adjustment due to lack of progress  [] Patient is not progressing as expected and requires additional follow up with physician  [] Other    Persisting Functional Limitations/Impairments:  []Sitting []Standing   []Transfers  []Sleeping   []Reaching []Lifting   []ADLs []Housework  []Driving []Job related tasks  []Sports/Recreation []Other:    ASSESSMENT: Pt had fair exercise tolerance, cues to remain within pain-free ranges for stretches and for form with CC shoulder strengthening exercises. Demonstrated improved ER PROM this date but remains the same into flexion and abduction.  Trial US to R bicep this date as pt was very tight and tender to touch; did note that biofreeze felt good. Continued with IFC at end of session as pt has found this to be helpful. Treatment/Activity Tolerance:  [] Patient able to complete tx [] Patient limited by fatique  [] Patient limited by pain  [] Patient limited by other medical complications  [] Other:     Prognosis:  [x] Good [] Fair  [] Poor    Patient Requires Follow-up: [x] Yes  [] No    Return to Play:    [x]  N/A     []  Stage 1: Intro to Strength   []  Stage 2: Dynamic Strength and Intro to Plyometrics   []  Stage 3: Advanced Plyometrics and Intro to Throwing   []  Stage 4: Sport specific Training/Return to Sport     []  Ready to Return to Play, [employer]   []  Not Ready for Return to Sports   Comments:      Plan for next treatment session: recommend pulleys for home    PLAN: See eval. PT 2x / week for 8 weeks. [] Continue per plan of care [] Alter current plan (see comments)  [x] Plan of care initiated [] Hold pending MD visit [] Discharge    Electronically signed by: Katt Johns [id number]      Note: If patient does not return for scheduled/ recommended follow up visits, this note will serve as a discharge from care along with most recent update on progress. Yes

## 2022-09-09 ENCOUNTER — HOSPITAL ENCOUNTER (OUTPATIENT)
Dept: PHYSICAL THERAPY | Age: 47
Setting detail: THERAPIES SERIES
Discharge: HOME | End: 2022-09-09
Payer: COMMERCIAL

## 2022-09-09 PROCEDURE — 97530 THERAPEUTIC ACTIVITIES: CPT

## 2022-09-09 PROCEDURE — G0283 ELEC STIM OTHER THAN WOUND: HCPCS

## 2022-09-09 PROCEDURE — 97140 MANUAL THERAPY 1/> REGIONS: CPT

## 2022-09-09 PROCEDURE — 97110 THERAPEUTIC EXERCISES: CPT

## 2022-09-09 NOTE — FLOWSHEET NOTE
[facility] Cast  Phone: [phone]  Fax: [phone]    Physical Therapy Treatment Note/ Progress Report:     Date:  2022    Patient Name:  Lenka Nichols  \"WILL\" :  1975  MRN: [id number]  Restrictions/Precautions:    Medical/Treatment Diagnosis Information:  Adhesive capsulitis of right shoulder (M75.01)  Treatment Diagnosis: Decreased Right Shoulder AROM & Strength  Insurance/Certification information:  PT Insurance Information: [employer] ([employer])  Physician Information:  Roland Lebron MD  Plan of care signed (Y/N): []  Yes  [x]  No     Date of Patient follow up with Physician: 10/4     Progress Report: []  Yes  [x]  No     Date Range for reporting period:  Beginnin22  Ending:     Progress report due (10 Rx/or 30 days whichever is less):  09    Recertification due (POC duration/ or 90 days whichever is less):  22    Visit # Insurance Allowable Auth required? Date Range    [place] []  Yes  []  No PCY     Latex Allergy:  [x]NO      []YES  Preferred Language for Healthcare:   [x]English       []other:    Functional Scale:        Date assessed:  FOTO physical FS primary measure score = 53; risk adjusted 53 22      Pain level:  5/10     SUBJECTIVE: Pt reports that he isn't feeling much better, has pain with exercises but ROM is getting better. OBJECTIVE: See eval  Observation:   Test measurements:      RESTRICTIONS/PRECAUTIONS:     Exercises/Interventions:   R shldr  Therapeutic Exercise (93588) Resistance / level Sets / Seconds  Reps Notes/Cues   UBE  2' for     Pulleys   10 flex, scap    Ball on table roll green  20 ea    Wall wash flex   20 flex    Cane AAROM: IR behind back, ext, abd   HEP          3-way curls  B ER  Bent row 3#  3#  15 ea  15           Doorway pec stretch  20\"x3                   Therapeutic Activities (31454)       CC:   Mid row  Shldr ext  [employer] add  IR/ER  High row  PNF 10#  5#  5#  2.5#  12.5#  5#    35  80  64  31 T  21  01 R                                Neuromuscular Re-ed (34575)                                          Manual Intervention (01202)       R shldr PROM  9'     [address] mobs / gentle distraction  3'     Manual STM to R delt  5'                              Pt. Education:  -pt educated on diagnosis, prognosis and expectations for rehab  -all pt questions were answered    Home Exercise Program:  9/9: shoulder flexion wall wash, tband mid row  8/8/22: The following exercises were performed and added to the pt's home program (educated on appropriate frequency, intensity and duration etc.):    Access Code: [email or web address]  URL: [email or web address]  Date: 08/12/2022  Prepared by: Sherri Lenz     Exercises  Supine Shoulder Flexion with Dowel - 1 x daily - 7 x weekly - 3 sets - 10 reps  Standing Shoulder Abduction AAROM with Dowel - 1 x daily - 7 x weekly - 3 sets - 10 reps  Supine Shoulder External Rotation in 45 Degrees Abduction AAROM with Dowel - 1 x daily - 7 x weekly - 3 sets - 10 reps  Seated Upper Trapezius Stretch - 1 x daily - 7 x weekly - 3 sets - 10 reps    Therapeutic Exercise and NMR EXR  [] (43005) Provided verbal/tactile cueing for activities related to strengthening, flexibility, endurance, ROM  for improvements in scapular, scapulothoracic and UE control with self care, reaching, carrying, lifting, house/yardwork, driving/computer work.    [] (35459) Provided verbal/tactile cueing for activities related to improving balance, coordination, kinesthetic sense, posture, motor skill, proprioception  to assist with  scapular, scapulothoracic and UE control with self care, reaching, carrying, lifting, house/yardwork, driving/computer work.     Therapeutic Activities:    [] (37600 or 39803) Provided verbal/tactile cueing for activities related to improving balance, coordination, kinesthetic sense, posture, motor skill, proprioception and motor activation to allow for proper function of scapular, scapulothoracic and UE control with self care, carrying, lifting, driving/computer work. Home Exercise Program:    [x] (45797) Reviewed/Progressed HEP activities related to strengthening, flexibility, endurance, ROM of scapular, scapulothoracic and UE control with self care, reaching, carrying, lifting, house/yardwork, driving/computer work  [] (44422) Reviewed/Progressed HEP activities related to improving balance, coordination, kinesthetic sense, posture, motor skill, proprioception of scapular, scapulothoracic and UE control with self care, reaching, carrying, lifting, house/yardwork, driving/computer work      Manual Treatments:  PROM / STM / Oscillations-Mobs:  G-I, II, III, IV (PA's, Inf., Post.)  [] (97004) Provided manual therapy to mobilize soft tissue/joints of cervical/CT, scapular GHJ and UE for the purpose of modulating pain, promoting relaxation,  increasing ROM, reducing/eliminating soft tissue swelling/inflammation/restriction, improving soft tissue extensibility and allowing for proper ROM for normal function with self care, reaching, carrying, lifting, house/yardwork, driving/computer work    Modalities: 9/9:  [facility]' - pt seated    Charges:  Timed Code Treatment Minutes: 55   Total Treatment Minutes: 55       [] EVAL - LOW (65182)   [] EVAL - MOD (78325)  [] EVAL - HIGH (20561)  [] RE-EVAL (21642)  [x] TE (72022) x  1    [] Ionto (35331)  [] NMR (86165) x      [] Vaso (19826)  [x] Manual (22435) x 1     [] Ultrasound  [x] TA (63901) x  1    [] Mech Traction (66642)  [] Gait Training x     [x] ES (un) (20252): 1  [] Aquatic therapy x   [] Other:   [] Group:     If BWC Please Indicate Time In/Out  CPT Code Time in Time out                                         GOALS:  Therapist goals for Patient:  Short Term Goals: To be achieved in: 2 weeks  1. Independent in HEP and progression per patient tolerance, in order to prevent re-injury.   [] flexion; tight and tender along R proximal bicep. Discussed with pt about use of pulleys / TENS unit at home and updated HEP to include tband mid row and wall wash shoulder flexion. Treatment/Activity Tolerance:  [] Patient able to complete tx [] Patient limited by fatique  [] Patient limited by pain  [] Patient limited by other medical complications  [] Other:     Prognosis:  [x] Good [] Fair  [] Poor    Patient Requires Follow-up: [x] Yes  [] No    Return to Play:    [x]  N/A     []  Stage 1: Intro to Strength   []  Stage 2: Dynamic Strength and Intro to Plyometrics   []  Stage 3: Advanced Plyometrics and Intro to Throwing   []  Stage 4: Sport specific Training/Return to Sport     []  Ready to Return to Play, [employer]   []  Not Ready for Return to Sports   Comments:      Plan for next treatment session:     PLAN: See eval. PT 2x / week for 8 weeks. [] Continue per plan of care [] Alter current plan (see comments)  [x] Plan of care initiated [] Hold pending MD visit [] Discharge    Electronically signed by: Shonda Yadav, [id number]      Note: If patient does not return for scheduled/ recommended follow up visits, this note will serve as a discharge from care along with most recent update on progress.

## 2022-09-12 ENCOUNTER — HOSPITAL ENCOUNTER (OUTPATIENT)
Dept: PHYSICAL THERAPY | Age: 47
Setting detail: THERAPIES SERIES
Discharge: HOME | End: 2022-09-12
Payer: COMMERCIAL

## 2022-09-12 PROCEDURE — 97530 THERAPEUTIC ACTIVITIES: CPT

## 2022-09-12 PROCEDURE — 97110 THERAPEUTIC EXERCISES: CPT

## 2022-09-12 PROCEDURE — 97140 MANUAL THERAPY 1/> REGIONS: CPT

## 2022-09-12 PROCEDURE — G0283 ELEC STIM OTHER THAN WOUND: HCPCS

## 2022-09-12 NOTE — FLOWSHEET NOTE
[facility] Cast  Phone: [phone]  Fax: [phone]    Physical Therapy Treatment Note/ Progress Report:     Date:  2022    Patient Name:  Yarely Calixto  \"WILL\" :  1975  MRN: [id number]  Restrictions/Precautions:    Medical/Treatment Diagnosis Information:  Adhesive capsulitis of right shoulder (M75.01)  Treatment Diagnosis: Decreased Right Shoulder AROM & Strength  Insurance/Certification information:  PT Insurance Information: [employer] ([employer])  Physician Information:  Rusty Vaughan MD  Plan of care signed (Y/N): []  Yes  [x]  No     Date of Patient follow up with Physician: 10/4     Progress Report: []  Yes  [x]  No     Date Range for reporting period:  Beginnin22  Ending:     Progress report due (10 Rx/or 30 days whichever is less):  46    Recertification due (POC duration/ or 90 days whichever is less):  22    Visit # Insurance Allowable Auth required? Date Range    [place] []  Yes  []  No PCY     Latex Allergy:  [x]NO      []YES  Preferred Language for Healthcare:   [x]English       []other:    Functional Scale:        Date assessed:  FOTO physical FS primary measure score = 53; risk adjusted 53 22      Pain level:  5/10     SUBJECTIVE: Pt reports that he is sore today, maybe overdid it with exercises at  home yesterday. Was sore after bowling last Thursday but not throbbing in his shoulder. OBJECTIVE: See eval  Observation:   Test measurements:      RESTRICTIONS/PRECAUTIONS:     Exercises/Interventions:   R shldr  Therapeutic Exercise (93401) Resistance / level Sets / Seconds  Reps Notes/Cues   UBE  2' for     Pulleys   10 flex, scap    Ball on table roll green  20 ea    Wall wash flex   20 flex    Cane AAROM: IR behind back, ext, abd   HEP          3-way curls  B ER  Bent row 3#  3#  15 ea  15           Doorway pec stretch  20\"x3                   Therapeutic Activities (00990)       CC:   Mid row  Shldr ext  [employer] add  IR/ER  High row  PNF   10#  5#  5#  2.5#  12.5#  5#    20  20  20  20 R  20  10 R                                Neuromuscular Re-ed (82340)                                          Manual Intervention (09812)       R shldr PROM  9'     [address] / gentle distraction  3'     Manual STM to R delt  5'                              Pt. Education:  -pt educated on diagnosis, prognosis and expectations for rehab  -all pt questions were answered    Home Exercise Program:  9/9: shoulder flexion wall wash, tband mid row  8/8/22: The following exercises were performed and added to the pt's home program (educated on appropriate frequency, intensity and duration etc.):    Access Code: [email or web address]  URL: [email or web address]  Date: 08/12/2022  Prepared by: Albertina Cowan     Exercises  Supine Shoulder Flexion with Dowel - 1 x daily - 7 x weekly - 3 sets - 10 reps  Standing Shoulder Abduction AAROM with Dowel - 1 x daily - 7 x weekly - 3 sets - 10 reps  Supine Shoulder External Rotation in 45 Degrees Abduction AAROM with Dowel - 1 x daily - 7 x weekly - 3 sets - 10 reps  Seated Upper Trapezius Stretch - 1 x daily - 7 x weekly - 3 sets - 10 reps    Therapeutic Exercise and NMR EXR  [] (48731) Provided verbal/tactile cueing for activities related to strengthening, flexibility, endurance, ROM  for improvements in scapular, scapulothoracic and UE control with self care, reaching, carrying, lifting, house/yardwork, driving/computer work.    [] (57363) Provided verbal/tactile cueing for activities related to improving balance, coordination, kinesthetic sense, posture, motor skill, proprioception  to assist with  scapular, scapulothoracic and UE control with self care, reaching, carrying, lifting, house/yardwork, driving/computer work.     Therapeutic Activities:    [] (17379 or 12380) Provided verbal/tactile cueing for activities related to improving balance, coordination, kinesthetic sense, posture, motor skill, proprioception and motor activation to allow for proper function of scapular, scapulothoracic and UE control with self care, carrying, lifting, driving/computer work. Home Exercise Program:    [x] (88304) Reviewed/Progressed HEP activities related to strengthening, flexibility, endurance, ROM of scapular, scapulothoracic and UE control with self care, reaching, carrying, lifting, house/yardwork, driving/computer work  [] (55789) Reviewed/Progressed HEP activities related to improving balance, coordination, kinesthetic sense, posture, motor skill, proprioception of scapular, scapulothoracic and UE control with self care, reaching, carrying, lifting, house/yardwork, driving/computer work      Manual Treatments:  PROM / STM / Oscillations-Mobs:  G-I, II, III, IV (PA's, Inf., Post.)  [] (16124) Provided manual therapy to mobilize soft tissue/joints of cervical/CT, scapular GHJ and UE for the purpose of modulating pain, promoting relaxation,  increasing ROM, reducing/eliminating soft tissue swelling/inflammation/restriction, improving soft tissue extensibility and allowing for proper ROM for normal function with self care, reaching, carrying, lifting, house/yardwork, driving/computer work    Modalities: 9/12:  IFC to R ldr x 10' - pt seated    Charges:  Timed Code Treatment Minutes: 55   Total Treatment Minutes: 55       [] EVAL - LOW (09721)   [] EVAL - MOD (46077)  [] EVAL - HIGH (55701)  [] RE-EVAL (67629)  [x] TE [phone]) x  1    [] Ionto (44806)  [] NMR (81080) x      [] Vaso (25865)  [x] Manual (08982) x 1     [] Ultrasound  [x] TA (31992) x  1    [] Mech Traction (56665)  [] Gait Training x     [x] ES (un) (88325): 1  [] Aquatic therapy x   [] Other:   [] Group:     If BWC Please Indicate Time In/Out  CPT Code Time in Time out                                         GOALS:  Therapist goals for Patient:  Short Term Goals: To be achieved in: 2 weeks  1.  Independent in HEP and progression per patient tolerance, in order to prevent re-injury. [] Progressing: [] Met: [] Not Met: [] Adjusted  2. Patient will have a decrease in pain to facilitate improvement in movement, function, and ADLs as indicated by Functional Deficits. [] Progressing: [] Met: [] Not Met: [] Adjusted     Long Term Goals: To be achieved in: 8 weeks  1. Disability index score of 10% or less for the UEFI or Quick DASH to assist with reaching prior level of function. [] Progressing: [] Met: [] Not Met: [] Adjusted  2. Patient will demonstrate increased AROM to 180 to allow for proper joint functioning as indicated by patients Functional Deficits. [] Progressing: [] Met: [] Not Met: [] Adjusted  3. Patient will demonstrate an increase in Strength to 5/5 to allow for proper functional mobility as indicated by patients Functional Deficits. [] Progressing: [] Met: [] Not Met: [] Adjusted  4. Patient will return to functional activities including lifting overhead without increased symptoms or restriction. [] Progressing: [] Met: [] Not Met: [] Adjusted    Overall Progression Towards Functional goals/ Treatment Progress Update:  [] Patient is progressing as expected towards functional goals listed. [] Progression is slowed due to complexities/Impairments listed. [] Progression has been slowed due to co-morbidities. [x] Plan just implemented, too soon to assess goals progression <30days   [] Goals require adjustment due to lack of progress  [] Patient is not progressing as expected and requires additional follow up with physician  [] Other    Persisting Functional Limitations/Impairments:  []Sitting []Standing   []Transfers  []Sleeping   []Reaching []Lifting   []ADLs []Housework  []Driving []Job related tasks  []Sports/Recreation []Other:    ASSESSMENT: Pt tolerated today's treatment session well. No c/o increased pain with exercises after cues to remain within pain-free ROM. Did well with CC shoulder strengthening exercises.  Improved ER PROM today but was very tight into abduction and flexion; tight and tender along R proximal bicep. Treatment/Activity Tolerance:  [] Patient able to complete tx [] Patient limited by fatique  [] Patient limited by pain  [] Patient limited by other medical complications  [] Other:     Prognosis:  [x] Good [] Fair  [] Poor    Patient Requires Follow-up: [x] Yes  [] No    Return to Play:    [x]  N/A     []  Stage 1: Intro to Strength   []  Stage 2: Dynamic Strength and Intro to Plyometrics   []  Stage 3: Advanced Plyometrics and Intro to Throwing   []  Stage 4: Sport specific Training/Return to Sport     []  Ready to Return to Play, [employer]   []  Not Ready for Return to Sports   Comments:      Plan for next treatment session:     PLAN: See eval. PT 2x / week for 8 weeks. [] Continue per plan of care [] Alter current plan (see comments)  [x] Plan of care initiated [] Hold pending MD visit [] Discharge    Electronically signed by: Mana Wade, [id number]      Note: If patient does not return for scheduled/ recommended follow up visits, this note will serve as a discharge from care along with most recent update on progress.

## 2022-09-15 ENCOUNTER — HOSPITAL ENCOUNTER (OUTPATIENT)
Dept: PHYSICAL THERAPY | Age: 47
Setting detail: THERAPIES SERIES
Discharge: HOME | End: 2022-09-15
Payer: COMMERCIAL

## 2022-09-15 PROCEDURE — 97530 THERAPEUTIC ACTIVITIES: CPT

## 2022-09-15 PROCEDURE — G0283 ELEC STIM OTHER THAN WOUND: HCPCS

## 2022-09-15 PROCEDURE — 97110 THERAPEUTIC EXERCISES: CPT

## 2022-09-15 PROCEDURE — 97140 MANUAL THERAPY 1/> REGIONS: CPT

## 2022-09-15 NOTE — FLOWSHEET NOTE
[facility]  Phone: [phone]  Fax: [phone]    Physical Therapy Treatment Note/ Progress Report:     Date:  9/15/2022    Patient Name:  Claus Dong  \"WILL\" :  1975  MRN: [id number]  Restrictions/Precautions:    Medical/Treatment Diagnosis Information:  Adhesive capsulitis of right shoulder (M75.01)  Treatment Diagnosis: Decreased Right Shoulder AROM & Strength  Insurance/Certification information:  PT Insurance Information: [employer] ([employer])  Physician Information:  Antonieta Betancur MD  Plan of care signed (Y/N): []  Yes  [x]  No     Date of Patient follow up with Physician: 10/4     Progress Report: []  Yes  [x]  No     Date Range for reporting period:  Beginnin22  Ending:     Progress report due (10 Rx/or 30 days whichever is less):      Recertification due (POC duration/ or 90 days whichever is less):  22    Visit # Insurance Allowable Auth required? Date Range    [place] []  [place]  []  No PCY     Latex Allergy:  [x]NO      []YES  Preferred Language for Healthcare:   [x]English       []other:    Functional Scale:        Date assessed:  FOTO physical FS primary measure score = 53; risk adjusted 53 22      Pain level:  10     SUBJECTIVE: Pt reports that he is doing alright, not much pain today    OBJECTIVE: See eval  Observation:   Test measurements:      RESTRICTIONS/PRECAUTIONS:     Exercises/Interventions:   R shldr  Therapeutic Exercise (75193) Resistance / level Sets / Seconds  Reps Notes/Cues   UBE  2' for     Pulleys   10 flex, scap    Ball on table roll green  20 ea    Wall wash flex  Ball on wall circles   20 flex  10 cw, ccw    Cane AAROM: IR behind back, ext, abd   HEP          3-way curls  B ER  Bent row 3#  3#  20 ea  15           Doorway pec stretch  20\"x3                   Therapeutic Activities (57304)       CC:   Mid row  Shldr ext  [employer] add  IR/ER  High row  PNF 10#  5#  5#  2.5#  12.5#  5#    64  03  85  25 R  20  10 R    Shoulder flex / scap to 90   X [address] push up                     Neuromuscular Re-ed (23808)                                          Manual Intervention ([phone])       R shldr PROM  9'     [address] mobs / gentle distraction  3'     Manual STM to R delt  5'                              Pt. Education:  -pt educated on diagnosis, prognosis and expectations for rehab  -all pt questions were answered    Home Exercise Program:  9/9: shoulder flexion wall wash, tband mid row  8/8/22: The following exercises were performed and added to the pt's home program (educated on appropriate frequency, intensity and duration etc.):    Access Code: [email or web address]  URL: [email or web address]  Date: 08/12/2022  Prepared by: Adrian Snowden     Exercises  Supine Shoulder Flexion with Dowel - 1 x daily - 7 x weekly - 3 sets - 10 reps  Standing Shoulder Abduction AAROM with Dowel - 1 x daily - 7 x weekly - 3 sets - 10 reps  Supine Shoulder External Rotation in 45 Degrees Abduction AAROM with Dowel - 1 x daily - 7 x weekly - 3 sets - 10 reps  Seated Upper Trapezius Stretch - 1 x daily - 7 x weekly - 3 sets - 10 reps    Therapeutic Exercise and NMR EXR  [] (79094) Provided verbal/tactile cueing for activities related to strengthening, flexibility, endurance, ROM  for improvements in scapular, scapulothoracic and UE control with self care, reaching, carrying, lifting, house/yardwork, driving/computer work.    [] (01892) Provided verbal/tactile cueing for activities related to improving balance, coordination, kinesthetic sense, posture, motor skill, proprioception  to assist with  scapular, scapulothoracic and UE control with self care, reaching, carrying, lifting, house/yardwork, driving/computer work.     Therapeutic Activities:    [] (82451 or 29875) Provided verbal/tactile cueing for activities related to improving balance, coordination, kinesthetic sense, posture, motor skill, proprioception and motor activation to allow for proper function of scapular, scapulothoracic and UE control with self care, carrying, lifting, driving/computer work. Home Exercise Program:    [x] (83263) Reviewed/Progressed HEP activities related to strengthening, flexibility, endurance, ROM of scapular, scapulothoracic and UE control with self care, reaching, carrying, lifting, house/yardwork, driving/computer work  [] (87745) Reviewed/Progressed HEP activities related to improving balance, coordination, kinesthetic sense, posture, motor skill, proprioception of scapular, scapulothoracic and UE control with self care, reaching, carrying, lifting, house/yardwork, driving/computer work      Manual Treatments:  PROM / STM / Oscillations-Mobs:  G-I, II, III, IV (PA's, Inf., Post.)  [] (36614) Provided manual therapy to mobilize soft tissue/joints of cervical/CT, scapular GHJ and UE for the purpose of modulating pain, promoting relaxation,  increasing ROM, reducing/eliminating soft tissue swelling/inflammation/restriction, improving soft tissue extensibility and allowing for proper ROM for normal function with self care, reaching, carrying, lifting, house/yardwork, driving/computer work    Modalities: 9/15:  IFC to R [facility] x 10' - pt seated    Charges:  Timed Code Treatment Minutes: 55   Total Treatment Minutes: 55       [] EVAL - LOW (07725)   [] EVAL - MOD (67310)  [] EVAL - HIGH (67993)  [] RE-EVAL (35549)  [x] TE (20503) x  1    [] Ionto (09506)  [] NMR (41719) x      [] Vaso (27968)  [x] Manual (47652) x 1     [] Ultrasound  [x] TA (89962) x  1    [] Mech Traction (62823)  [] Gait Training x     [x] ES (un) (25326): 1  [] Aquatic therapy x   [] Other:   [] Group:     If BWC Please Indicate Time In/Out  CPT Code Time in Time out                                         GOALS:  Therapist goals for Patient:  Short Term Goals: To be achieved in: 2 weeks  1.  Independent in HEP and progression per patient tolerance, in order to prevent re-injury. [] Progressing: [] Met: [] Not Met: [] Adjusted  2. Patient will have a decrease in pain to facilitate improvement in movement, function, and ADLs as indicated by Functional Deficits. [] Progressing: [] Met: [] Not Met: [] Adjusted     Long Term Goals: To be achieved in: 8 weeks  1. Disability index score of 10% or less for the UEFI or Quick DASH to assist with reaching prior level of function. [] Progressing: [] Met: [] Not Met: [] Adjusted  2. Patient will demonstrate increased AROM to 180 to allow for proper joint functioning as indicated by patients Functional Deficits. [] Progressing: [] Met: [] Not Met: [] Adjusted  3. Patient will demonstrate an increase in Strength to 5/5 to allow for proper functional mobility as indicated by patients Functional Deficits. [] Progressing: [] Met: [] Not Met: [] Adjusted  4. Patient will return to functional activities including lifting overhead without increased symptoms or restriction. [] Progressing: [] Met: [] Not Met: [] Adjusted    Overall Progression Towards Functional goals/ Treatment Progress Update:  [] Patient is progressing as expected towards functional goals listed. [] Progression is slowed due to complexities/Impairments listed. [] Progression has been slowed due to co-morbidities. [x] Plan just implemented, too soon to assess goals progression <30days   [] Goals require adjustment due to lack of progress  [] Patient is not progressing as expected and requires additional follow up with physician  [] Other    Persisting Functional Limitations/Impairments:  []Sitting []Standing   []Transfers  []Sleeping   []Reaching []Lifting   []ADLs []Housework  []Driving []Job related tasks  []Sports/Recreation []Other:    ASSESSMENT: Pt tolerated today's treatment session well. No c/o increased pain with exercises after cues to remain within pain-free ROM. Did well with CC shoulder strengthening exercises.  Improved ER PROM today but was very tight into abduction and flexion; tight and tender along R proximal bicep. Treatment/Activity Tolerance:  [] Patient able to complete tx [] Patient limited by fatique  [] Patient limited by pain  [] Patient limited by other medical complications  [] Other:     Prognosis:  [x] Good [] Fair  [] Poor    Patient Requires Follow-up: [x] Yes  [] No    Return to Play:    [x]  N/A     []  Stage 1: Intro to Strength   []  Stage 2: Dynamic Strength and Intro to Plyometrics   []  Stage 3: Advanced Plyometrics and Intro to Throwing   []  Stage 4: Sport specific Training/Return to Sport     []  Ready to Return to Play, [employer]   []  Not Ready for Return to Sports   Comments:      Plan for next treatment session:     PLAN: See eval. PT 2x / week for 8 weeks. [] Continue per plan of care [] Alter current plan (see comments)  [x] Plan of care initiated [] Hold pending MD visit [] Discharge    Electronically signed by: Gail Tinoco, [id number]      Note: If patient does not return for scheduled/ recommended follow up visits, this note will serve as a discharge from care along with most recent update on progress.

## 2022-09-22 ENCOUNTER — HOSPITAL ENCOUNTER (OUTPATIENT)
Dept: PHYSICAL THERAPY | Age: 47
Setting detail: THERAPIES SERIES
Discharge: HOME | End: 2022-09-22
Payer: COMMERCIAL

## 2022-09-22 PROCEDURE — 97530 THERAPEUTIC ACTIVITIES: CPT

## 2022-09-22 PROCEDURE — 97140 MANUAL THERAPY 1/> REGIONS: CPT

## 2022-09-22 PROCEDURE — G0283 ELEC STIM OTHER THAN WOUND: HCPCS

## 2022-09-22 NOTE — FLOWSHEET NOTE
[facility]  Phone: [phone]  Fax: [phone]    Physical Therapy Treatment Note/ Progress Report:     Date:  2022    Patient Name:  Jack Getting  \"WILL\" :  1975  MRN: [id number]  Restrictions/Precautions:    Medical/Treatment Diagnosis Information:  Adhesive capsulitis of right shoulder (M75.01)  Treatment Diagnosis: Decreased Right Shoulder AROM & Strength  Insurance/Certification information:  PT Insurance Information: [employer] (Med [employer]ec)  Physician Information:  Giana Bustamante MD  Plan of care signed (Y/N): []  Yes  [x]  No     Date of Patient follow up with Physician: 10/4     Progress Report: []  Yes  [x]  No     Date Range for reporting period:  Beginnin22  Ending:     Progress report due (10 Rx/or 30 days whichever is less):      Recertification due (POC duration/ or 90 days whichever is less):  22    Visit # Insurance Allowable Auth required? Date Range   10/16 [place] []  Yes  []  No PCY     Latex Allergy:  [x]NO      []YES  Preferred Language for Healthcare:   [x]English       []other:    Functional Scale:        Date assessed:  FOTO physical FS primary measure score = 53; risk adjusted 53 22      Pain level:  4/10     SUBJECTIVE: Pt reports that his shoulder is doing alright feels tighter than normal but had a nice trip.      OBJECTIVE:    :   R shldr PROM: 154 flex, 131 abd, 56 ER, 70 IR  R shldr MMT: 5 flex, 4+ abd, 5 ER/IR, 5 bic/tri    RESTRICTIONS/PRECAUTIONS:     Exercises/Interventions:   R shldr  Therapeutic Exercise (20286) Resistance / level Sets / Seconds  Reps Notes/Cues   UBE  2' for     Pulleys   10 flex, scap    Ball on table roll green  20 ea    Wall wash flex  Ball on wall circles      Cane AAROM: IR behind back, ext, abd   HEP          3-way curls  B ER  Bent row 3#  3#  20 ea  15           Doorway pec stretch                    Therapeutic Activities (00468) CC:  Mid row  Shldr ext  [employer] add  IR/ER  High row  PNF   10#  5#  5#  2.5#  12.5#  5#    20  20  20  20 R  20     Shoulder flex / scap to 90      Wall push up                     Neuromuscular Re-ed (19531)                                          Manual Intervention (63179)       R shldr PROM  8'     [address] mobs / gentle distraction      Manual STM to R delt  5'                              Pt. Education:  -pt educated on diagnosis, prognosis and expectations for rehab  -all pt questions were answered    Home Exercise Program:  9/9: shoulder flexion wall wash, tband mid row  8/8/22: The following exercises were performed and added to the pt's home program (educated on appropriate frequency, intensity and duration etc.):    Access Code: [email or web address]  URL: [email or web address]  Date: 08/12/2022  Prepared by: Lisandro Ruth     Exercises  Supine Shoulder Flexion with Dowel - 1 x daily - 7 x weekly - 3 sets - 10 reps  Standing Shoulder Abduction AAROM with Dowel - 1 x daily - 7 x weekly - 3 sets - 10 reps  Supine Shoulder External Rotation in 45 Degrees Abduction AAROM with Dowel - 1 x daily - 7 x weekly - 3 sets - 10 reps  Seated Upper Trapezius Stretch - 1 x daily - 7 x weekly - 3 sets - 10 reps    Therapeutic Exercise and NMR EXR  [] (26022) Provided verbal/tactile cueing for activities related to strengthening, flexibility, endurance, ROM  for improvements in scapular, scapulothoracic and UE control with self care, reaching, carrying, lifting, house/yardwork, driving/computer work.    [] (33918) Provided verbal/tactile cueing for activities related to improving balance, coordination, kinesthetic sense, posture, motor skill, proprioception  to assist with  scapular, scapulothoracic and UE control with self care, reaching, carrying, lifting, house/yardwork, driving/computer work.     Therapeutic Activities:    [] (36016 or 10360) Provided verbal/tactile cueing for activities related to improving balance, Independent in HEP and progression per patient tolerance, in order to prevent re-injury. [] Progressing: [] Met: [] Not Met: [] Adjusted  2. Patient will have a decrease in pain to facilitate improvement in movement, function, and ADLs as indicated by Functional Deficits. [] Progressing: [] Met: [] Not Met: [] Adjusted     Long Term Goals: To be achieved in: 8 weeks  1. Disability index score of 10% or less for the UEFI or Quick DASH to assist with reaching prior level of function. [] Progressing: [] Met: [] Not Met: [] Adjusted  2. Patient will demonstrate increased AROM to 180 to allow for proper joint functioning as indicated by patients Functional Deficits. [] Progressing: [] Met: [] Not Met: [] Adjusted  3. Patient will demonstrate an increase in Strength to 5/5 to allow for proper functional mobility as indicated by patients Functional Deficits. [] Progressing: [] Met: [] Not Met: [] Adjusted  4. Patient will return to functional activities including lifting overhead without increased symptoms or restriction. [] Progressing: [] Met: [] Not Met: [] Adjusted    Overall Progression Towards Functional goals/ Treatment Progress Update:  [] Patient is progressing as expected towards functional goals listed. [] Progression is slowed due to complexities/Impairments listed. [] Progression has been slowed due to co-morbidities. [x] Plan just implemented, too soon to assess goals progression <30days   [] Goals require adjustment due to lack of progress  [] Patient is not progressing as expected and requires additional follow up with physician  [] Other    Persisting Functional Limitations/Impairments:  []Sitting []Standing   []Transfers  []Sleeping   []Reaching []Lifting   []ADLs []Housework  []Driving []Job related tasks  []Sports/Recreation []Other:    ASSESSMENT: Pt had fair exercise tolerance, no c/o increased pain.  Demonstrated improved R shoulder strength and ROM; remains tight and painful going into ER and abduction. Tender along R AC joint but did feel better after light manual STM. Continued with IFC as pt has found this to be helpful. Treatment/Activity Tolerance:  [] Patient able to complete tx [] Patient limited by fatique  [] Patient limited by pain  [] Patient limited by other medical complications  [] Other:     Prognosis:  [x] Good [] Fair  [] Poor    Patient Requires Follow-up: [x] Yes  [] No    Return to Play:    [x]  N/A     []  Stage 1: Intro to Strength   []  Stage 2: Dynamic Strength and Intro to Plyometrics   []  Stage 3: Advanced Plyometrics and Intro to Throwing   []  Stage 4: Sport specific Training/Return to Sport     []  Ready to Return to Play, [employer]   []  Not Ready for Return to Sports   Comments:      Plan for next treatment session: FOTO    PLAN: See eval. PT 2x / week for 8 weeks. [] Continue per plan of care [] Alter current plan (see comments)  [x] Plan of care initiated [] Hold pending MD visit [] Discharge    Electronically signed by: Marylin Blackman, [id number]      Note: If patient does not return for scheduled/ recommended follow up visits, this note will serve as a discharge from care along with most recent update on progress.

## 2022-09-26 ENCOUNTER — HOSPITAL ENCOUNTER (OUTPATIENT)
Dept: PHYSICAL THERAPY | Age: 47
Setting detail: THERAPIES SERIES
Discharge: HOME | End: 2022-09-26
Payer: COMMERCIAL

## 2022-09-26 PROCEDURE — G0283 ELEC STIM OTHER THAN WOUND: HCPCS

## 2022-09-26 PROCEDURE — 97140 MANUAL THERAPY 1/> REGIONS: CPT

## 2022-09-26 PROCEDURE — 97110 THERAPEUTIC EXERCISES: CPT

## 2022-09-26 PROCEDURE — 97530 THERAPEUTIC ACTIVITIES: CPT

## 2022-09-26 NOTE — FLOWSHEET NOTE
[facility]  Phone: [phone]  Fax: [phone]    Physical Therapy Treatment Note/ Progress Report:     Date:  2022    Patient Name:  Rui Moss  \"WILL\" :  1975  MRN: [id number]  Restrictions/Precautions:    Medical/Treatment Diagnosis Information:  Adhesive capsulitis of right shoulder (M75.01)  Treatment Diagnosis: Decreased Right Shoulder AROM & Strength  Insurance/Certification information:  PT Insurance Information: [employer] (Med [employer]ec)  Physician Information:  Trudy Moy MD  Plan of care signed (Y/N): []  Yes  [x]  No     Date of Patient follow up with Physician: 10/4     Progress Report: []  Yes  [x]  No     Date Range for reporting period:  Beginnin22  Ending:     Progress report due (10 Rx/or 30 days whichever is less):      Recertification due (POC duration/ or 90 days whichever is less):  22    Visit # Insurance Allowable Auth required? Date Range    [place] []  Yes  []  No PCY     Latex Allergy:  [x]NO      []YES  Preferred Language for Healthcare:   [x]English       []other:    Functional Scale:        Date assessed:  TO physical FS primary measure score = 53; risk adjusted 53 22      Pain level: 3/10     SUBJECTIVE: Pt reports that his shoulder is feeling pretty good today, had a good week bowling last week.      OBJECTIVE:    :   R shldr PROM: 154 flex, 131 abd, 56 ER, 70 IR  R shldr MMT: 5 flex, 4+ abd, 5 ER/IR, 5 bic/tri    RESTRICTIONS/PRECAUTIONS:     Exercises/Interventions:   R shldr  Therapeutic Exercise (97317) Resistance / level Sets / Seconds  Reps Notes/Cues   UBE  2' for     Pulleys   10 flex, scap    Ball on table roll green  20 ea    Wall wash flex  Ball on wall circles   10 cw, ccw    Cane AAROM: IR behind back, ext, abd   HEP          3-way curls  B ER  Bent row 5#  3#  5#  20 ea  15  20 R           Doorway pec stretch                    Therapeutic Activities (36771)       CC: Mid row  Shldr ext  [employer] add  IR/ER  High row  PNF   10#  5#  5#  5#  12.5#  5#    20  20  20  20 R  20     Shoulder flex / scap to 90      Wall push up                     Neuromuscular Re-ed (85895)                                          Manual Intervention (03435)       R shldr PROM  10'     [address] mobs / gentle distraction      Manual STM to R delt                               Pt. Education:  -pt educated on diagnosis, prognosis and expectations for rehab  -all pt questions were answered    Home Exercise Program:  9/9: shoulder flexion wall wash, tband mid row  8/8/22: The following exercises were performed and added to the pt's home program (educated on appropriate frequency, intensity and duration etc.):    Access Code: [email or web address]  URL: [email or web address]  Date: 08/12/2022  Prepared by: Erickson Ponce     Exercises  Supine Shoulder Flexion with Dowel - 1 x daily - 7 x weekly - 3 sets - 10 reps  Standing Shoulder Abduction AAROM with Dowel - 1 x daily - 7 x weekly - 3 sets - 10 reps  Supine Shoulder External Rotation in 45 Degrees Abduction AAROM with Dowel - 1 x daily - 7 x weekly - 3 sets - 10 reps  Seated Upper Trapezius Stretch - 1 x daily - 7 x weekly - 3 sets - 10 reps    Therapeutic Exercise and NMR EXR  [] (95193) Provided verbal/tactile cueing for activities related to strengthening, flexibility, endurance, ROM  for improvements in scapular, scapulothoracic and UE control with self care, reaching, carrying, lifting, house/yardwork, driving/computer work.    [] (64786) Provided verbal/tactile cueing for activities related to improving balance, coordination, kinesthetic sense, posture, motor skill, proprioception  to assist with  scapular, scapulothoracic and UE control with self care, reaching, carrying, lifting, house/yardwork, driving/computer work.     Therapeutic Activities:    [] (36244 or 02181) Provided verbal/tactile cueing for activities related to improving balance, coordination, kinesthetic sense, posture, motor skill, proprioception and motor activation to allow for proper function of scapular, scapulothoracic and UE control with self care, carrying, lifting, driving/computer work. Home Exercise Program:    [x] (40811) Reviewed/Progressed HEP activities related to strengthening, flexibility, endurance, ROM of scapular, scapulothoracic and UE control with self care, reaching, carrying, lifting, house/yardwork, driving/computer work  [] (65031) Reviewed/Progressed HEP activities related to improving balance, coordination, kinesthetic sense, posture, motor skill, proprioception of scapular, scapulothoracic and UE control with self care, reaching, carrying, lifting, house/yardwork, driving/computer work      Manual Treatments:  PROM / STM / Oscillations-Mobs:  G-I, II, III, IV (PA's, Inf., Post.)  [] (62323) Provided manual therapy to mobilize soft tissue/joints of cervical/CT, scapular GHJ and UE for the purpose of modulating pain, promoting relaxation,  increasing ROM, reducing/eliminating soft tissue swelling/inflammation/restriction, improving soft tissue extensibility and allowing for proper ROM for normal function with self care, reaching, carrying, lifting, house/yardwork, driving/computer work    Modalities: 9/26:  IFC to R ldr x 15' - pt seated    Charges:  Timed Code Treatment Minutes: 54   Total Treatment Minutes: 54       [] EVAL - LOW (99333)   [] EVAL - MOD (69485)  [] EVAL - HIGH (45041)  [] RE-EVAL (09857)  [x] TE (76145) x 1     [] Ionto (96110)  [] NMR (34538) x      [] Vaso (27376)  [x] Manual (72272) x 1     [] Ultrasound  [x] TA (93152) x  1    [] Mech Traction (15819)  [] Gait Training x     [x] ES (un) (00625): 1  [] Aquatic therapy x   [] Other:   [] Group:     If [facility] Please Indicate Time In/Out  CPT Code Time in Time out                                         GOALS:  Therapist goals for Patient:  Short Term Goals: To be achieved in: 2 weeks  1. Independent in HEP and progression per patient tolerance, in order to prevent re-injury. [] Progressing: [] Met: [] Not Met: [] Adjusted  2. Patient will have a decrease in pain to facilitate improvement in movement, function, and ADLs as indicated by Functional Deficits. [] Progressing: [] Met: [] Not Met: [] Adjusted     Long Term Goals: To be achieved in: 8 weeks  1. Disability index score of 10% or less for the UEFI or Quick DASH to assist with reaching prior level of function. [] Progressing: [] Met: [] Not Met: [] Adjusted  2. Patient will demonstrate increased AROM to 180 to allow for proper joint functioning as indicated by patients Functional Deficits. [] Progressing: [] Met: [] Not Met: [] Adjusted  3. Patient will demonstrate an increase in Strength to 5/5 to allow for proper functional mobility as indicated by patients Functional Deficits. [] Progressing: [] Met: [] Not Met: [] Adjusted  4. Patient will return to functional activities including lifting overhead without increased symptoms or restriction. [] Progressing: [] Met: [] Not Met: [] Adjusted    Overall Progression Towards Functional goals/ Treatment Progress Update:  [] Patient is progressing as expected towards functional goals listed. [] Progression is slowed due to complexities/Impairments listed. [] Progression has been slowed due to co-morbidities. [x] Plan just implemented, too soon to assess goals progression <30days   [] Goals require adjustment due to lack of progress  [] Patient is not progressing as expected and requires additional follow up with physician  [] Other    Persisting Functional Limitations/Impairments:  []Sitting []Standing   []Transfers  []Sleeping   []Reaching []Lifting   []ADLs []Housework  []Driving []Job related tasks  []Sports/Recreation []Other:    ASSESSMENT: Pt noted that he still had some soreness in R shoulder with CC strengthening exercises but was much less painful than in previous visits.  Able to progress resistance with free weights exercises without issue. Challenged by ball on wall circles. Much smoother R shoulder PROM today, remains tight at flexion and ER end ranges. Treatment/Activity Tolerance:  [] Patient able to complete tx [] Patient limited by fatique  [] Patient limited by pain  [] Patient limited by other medical complications  [] Other:     Prognosis:  [x] Good [] Fair  [] Poor    Patient Requires Follow-up: [x] Yes  [] No    Return to Play:    [x]  N/A     []  Stage 1: Intro to Strength   []  Stage 2: Dynamic Strength and Intro to Plyometrics   []  Stage 3: Advanced Plyometrics and Intro to Throwing   []  Stage 4: Sport specific Training/Return to Sport     []  Ready to Return to Play, [employer]   []  Not Ready for Return to Sports   Comments:      Plan for next treatment session:     PLAN: See eval. PT 2x / week for 8 weeks. [] Continue per plan of care [] Alter current plan (see comments)  [x] Plan of care initiated [] Hold pending MD visit [] Discharge    Electronically signed by: LUCIA [id number]      Note: If patient does not return for scheduled/ recommended follow up visits, this note will serve as a discharge from care along with most recent update on progress.

## 2022-09-28 ENCOUNTER — HOSPITAL ENCOUNTER (OUTPATIENT)
Dept: PHYSICAL THERAPY | Age: 47
Setting detail: THERAPIES SERIES
Discharge: HOME | End: 2022-09-28
Payer: COMMERCIAL

## 2022-09-28 PROCEDURE — 97530 THERAPEUTIC ACTIVITIES: CPT

## 2022-09-28 PROCEDURE — 97110 THERAPEUTIC EXERCISES: CPT

## 2022-09-28 PROCEDURE — G0283 ELEC STIM OTHER THAN WOUND: HCPCS

## 2022-09-28 PROCEDURE — 97140 MANUAL THERAPY 1/> REGIONS: CPT

## 2022-09-28 NOTE — FLOWSHEET NOTE
[facility]  Phone: [phone]  Fax: [phone]    Physical Therapy Treatment Note/ Progress Report:     Date:  2022    Patient Name:  Adelina Otero  \"WILL\" :  1975  MRN: [id number]  Restrictions/Precautions:    Medical/Treatment Diagnosis Information:  Adhesive capsulitis of right shoulder (M75.01)  Treatment Diagnosis: Decreased Right Shoulder AROM & Strength  Insurance/Certification information:  PT Insurance Information: [employer] (Med [employer]ec)  Physician Information:  Marcello Oconnor MD  Plan of care signed (Y/N): []  Yes  [x]  No     Date of Patient follow up with Physician: 10/4     Progress Report: [x]  Yes  []  No     Date Range for reporting period:  Beginnin22  Ending:     Progress report due (10 Rx/or 30 days whichever is less):      Recertification due (POC duration/ or 90 days whichever is less):  22    Visit # Insurance Allowable Auth required? Date Range    [place] []  Yes  []  No PCY     Latex Allergy:  [x]NO      []YES  Preferred Language for Healthcare:   [x]English       []other:    Functional Scale:        Date assessed:  TO physical FS primary measure score = 53; risk adjusted 53 22      Pain level: 3/10     SUBJECTIVE: Pt states he feels like he is improving ever since starting physical therapy, has noticed improvements in his range of motion and overall function although not at 100% just yet. OBJECTIVE:   22: Supine R shoulder AROM Flexion: 160 degrees.     :   R shldr PROM: 154 flex, 131 abd, 56 ER, 70 IR  R shldr MMT: 5 flex, 4+ abd, 5 ER/IR, 5 bic/tri    RESTRICTIONS/PRECAUTIONS:     Exercises/Interventions:   R shldr  Therapeutic Exercise (53926) Resistance / level Sets / Seconds  Reps Notes/Cues   UBE  2' for     Pulleys   10 flex, scap    Ball on table roll green  20 ea    Wall wash flex  Ball on wall circles   10 cw, ccw    Cane AAROM: IR behind back, ext, abd HEP          3-way curls  B ER  Bent row 5#  3#  5#  20 ea  15  20 R           Doorway pec stretch                    Therapeutic Activities (73263)       CC: Mid row  Shldr ext  [employer] add  IR/ER  High row  PNF   10#  5#  5#  5#  12.5#  5#    20  20  20  20 R  20  10 R    Shoulder flex / scap to 90      Wall push up                     Neuromuscular Re-ed (32825)                                          Manual Intervention (20451)       R shldr PROM  10'     [address] mobs / gentle distraction      Manual STM to R delt                               Pt. Education:  -pt educated on diagnosis, prognosis and expectations for rehab  -all pt questions were answered    Home Exercise Program:  9/9: shoulder flexion wall wash, tband mid row  8/8/22: The following exercises were performed and added to the pt's home program (educated on appropriate frequency, intensity and duration etc.):    Access Code: [email or web address]  URL: [employer]/  Date: 08/12/2022  Prepared by: Ross Sanders     Exercises  Supine Shoulder Flexion with Dowel - 1 x daily - 7 x weekly - 3 sets - 10 reps  Standing Shoulder Abduction AAROM with Dowel - 1 x daily - 7 x weekly - 3 sets - 10 reps  Supine Shoulder External Rotation in 45 Degrees Abduction AAROM with Dowel - 1 x daily - 7 x weekly - 3 sets - 10 reps  Seated Upper Trapezius Stretch - 1 x daily - 7 x weekly - 3 sets - 10 reps    Therapeutic Exercise and NMR EXR  [x] (57345) Provided verbal/tactile cueing for activities related to strengthening, flexibility, endurance, ROM  for improvements in scapular, scapulothoracic and UE control with self care, reaching, carrying, lifting, house/yardwork, driving/computer work.    [] (62848) Provided verbal/tactile cueing for activities related to improving balance, coordination, kinesthetic sense, posture, motor skill, proprioception  to assist with  scapular, scapulothoracic and UE control with self care, reaching, carrying, lifting, house/yardwork, driving/computer work. Therapeutic Activities:    [] (06560 or 30158) Provided verbal/tactile cueing for activities related to improving balance, coordination, kinesthetic sense, posture, motor skill, proprioception and motor activation to allow for proper function of scapular, scapulothoracic and UE control with self care, carrying, lifting, driving/computer work.      Home Exercise Program:    [x] (62078) Reviewed/Progressed HEP activities related to strengthening, flexibility, endurance, ROM of scapular, scapulothoracic and UE control with self care, reaching, carrying, lifting, house/yardwork, driving/computer work  [] (25316) Reviewed/Progressed HEP activities related to improving balance, coordination, kinesthetic sense, posture, motor skill, proprioception of scapular, scapulothoracic and UE control with self care, reaching, carrying, lifting, house/yardwork, driving/computer work      Manual Treatments:  PROM / STM / Oscillations-Mobs:  G-I, II, III, IV (PA's, Inf., Post.)  [] (69722) Provided manual therapy to mobilize soft tissue/joints of cervical/CT, scapular GHJ and UE for the purpose of modulating pain, promoting relaxation,  increasing ROM, reducing/eliminating soft tissue swelling/inflammation/restriction, improving soft tissue extensibility and allowing for proper ROM for normal function with self care, reaching, carrying, lifting, house/yardwork, driving/computer work    Modalities: 9/26:  IFC to R ldr x 15' - pt seated    Charges:  Timed Code Treatment Minutes: 60   Total Treatment Minutes: 60       [] EVAL - LOW (86905)   [] EVAL - MOD (11878)  [] EVAL - HIGH (69890)  [] RE-EVAL (26961)  [x] TE (53630) x 1     [] Ionto (36351)  [] NMR (45473) x      [] Vaso (45664)  [x] Manual (05160) x 1     [] Ultrasound  [x] TA (26002) x  1    [] Mech Traction (99163)  [] Gait Training x     [x] ES (un) (37726): 1  [] Aquatic therapy x   [] Other:   [] Group:     If [facility] Please Indicate Time In/Out  CPT Code Time in Time out                                         GOALS:  Therapist goals for Patient:  Short Term Goals: To be achieved in: 2 weeks  1. Independent in HEP and progression per patient tolerance, in order to prevent re-injury. [] Progressing: [x] Met: [] Not Met: [] Adjusted  2. Patient will have a decrease in pain to facilitate improvement in movement, function, and ADLs as indicated by Functional Deficits. [x] Progressing: [] Met: [] Not Met: [] Adjusted     Long Term Goals: To be achieved in: 8 weeks  1. Disability index score of 10% or less for the UEFI or Quick DASH to assist with reaching prior level of function. [x] Progressing: [] Met: [] Not Met: [] Adjusted  2. Patient will demonstrate increased AROM to 180 to allow for proper joint functioning as indicated by patients Functional Deficits. [x] Progressing: [] Met: [] Not Met: [] Adjusted  3. Patient will demonstrate an increase in Strength to 5/5 to allow for proper functional mobility as indicated by patients Functional Deficits. [x] Progressing: [] Met: [] Not Met: [] Adjusted  4. Patient will return to functional activities including lifting overhead without increased symptoms or restriction. [x] Progressing: [] Met: [] Not Met: [] Adjusted    Overall Progression Towards Functional goals/ Treatment Progress Update:  [x] Patient is progressing as expected towards functional goals listed. [] Progression is slowed due to complexities/Impairments listed. [] Progression has been slowed due to co-morbidities.   [] Plan just implemented, too soon to assess goals progression <30days   [] Goals require adjustment due to lack of progress  [] Patient is not progressing as expected and requires additional follow up with physician  [] Other    Persisting Functional Limitations/Impairments:  []Sitting []Standing   []Transfers  []Sleeping   [x]Reaching [x]Lifting   [x]ADLs [x]Housework  [x]Driving [x]Job related

## 2022-10-03 ENCOUNTER — HOSPITAL ENCOUNTER (OUTPATIENT)
Dept: PHYSICAL THERAPY | Age: 47
Setting detail: THERAPIES SERIES
Discharge: HOME | End: 2022-10-03
Payer: COMMERCIAL

## 2022-10-03 PROCEDURE — 97140 MANUAL THERAPY 1/> REGIONS: CPT

## 2022-10-03 PROCEDURE — G0283 ELEC STIM OTHER THAN WOUND: HCPCS

## 2022-10-03 PROCEDURE — 97110 THERAPEUTIC EXERCISES: CPT

## 2022-10-03 PROCEDURE — 97530 THERAPEUTIC ACTIVITIES: CPT

## 2022-10-03 NOTE — FLOWSHEET NOTE
[facility]  Phone: [phone]  Fax: [phone]    Physical Therapy Treatment Note/ Progress Report:     Date:  10/3/2022    Patient Name:  Lizette Ruth  \"WILL\" :  1975  MRN: [id number]  Restrictions/Precautions:    Medical/Treatment Diagnosis Information:  Adhesive capsulitis of right shoulder (M75.01)  Treatment Diagnosis: Decreased Right Shoulder AROM & Strength  Insurance/Certification information:  PT Insurance Information: [employer] ([employer])  Physician Information:  Angelina Hernandez MD  Plan of care signed (Y/N): []  Yes  [x]  No     Date of Patient follow up with Physician:      Progress Report: [x]  Yes  []  No     Date Range for reporting period:  Beginnin22  Ending:     Progress report due (10 Rx/or 30 days whichever is less):  3/54/14    Recertification due (POC duration/ or 90 days whichever is less):  22    Visit # Insurance Allowable Auth required? Date Range    [place] []  Yes  []  No PCY     Latex Allergy:  [x]NO      []YES  Preferred Language for Healthcare:   [x]English       []other:    Functional Scale:        Date assessed:  TO physical FS primary measure score = 53; risk adjusted 53 22      Pain level: 310     SUBJECTIVE: Pt reports that he is doing alright today. Feels like he is improving just still having pain. Wants to avoid an injection. OBJECTIVE:   22: Supine R shoulder AROM Flexion: 160 degrees.     :   R shldr PROM: 154 flex, 131 abd, 56 ER, 70 IR  R shldr MMT: 5 flex, 4+ abd, 5 ER/IR, 5 bic/tri    RESTRICTIONS/PRECAUTIONS:     Exercises/Interventions:   R shldr  Therapeutic Exercise (25655) Resistance / level Sets / Seconds  Reps Notes/Cues   UBE  2' for     Pulleys   10 flex, scap    Ball on table roll green  20 ea    Wall wash flex  Ball on wall circles   red  10 cw, ccw    Cane AAROM: IR behind back, ext, abd   HEP          3-way curls  B ER  Bent row 5#  5#  5#  20 ea  15  20 R           Doorway pec stretch      SA punches  SL ER 3#  3#  20  20 R           Therapeutic Activities (64480)       CC: Mid row  Shldr ext  [employer] add  IR/ER  High row  PNF   10#  5#  5#  5#  12.5#  7.5#    20  20  20  20 R  20  10 R    Shoulder flex / scap to 90      Wall push up                     Neuromuscular Re-ed (47424)                                          Manual Intervention (29847)       R shldr PROM  10'     [address] mobs / gentle distraction      Manual STM to R delt                               Pt. Education:  -pt educated on diagnosis, prognosis and expectations for rehab  -all pt questions were answered    Home Exercise Program:  9/9: shoulder flexion wall wash, tband mid row  8/8/22: The following exercises were performed and added to the pt's home program (educated on appropriate frequency, intensity and duration etc.):    Access Code: [email or web address]  URL: [email or web address]  Date: 08/12/2022  Prepared by: Ambar Sawyer     Exercises  Supine Shoulder Flexion with Dowel - 1 x daily - 7 x weekly - 3 sets - 10 reps  Standing Shoulder Abduction AAROM with Dowel - 1 x daily - 7 x weekly - 3 sets - 10 reps  Supine Shoulder External Rotation in 45 Degrees Abduction AAROM with Dowel - 1 x daily - 7 x weekly - 3 sets - 10 reps  Seated Upper Trapezius Stretch - 1 x daily - 7 x weekly - 3 sets - 10 reps    Therapeutic Exercise and NMR EXR  [x] (84239) Provided verbal/tactile cueing for activities related to strengthening, flexibility, endurance, ROM  for improvements in scapular, scapulothoracic and UE control with self care, reaching, carrying, lifting, house/yardwork, driving/computer work.    [] (69742) Provided verbal/tactile cueing for activities related to improving balance, coordination, kinesthetic sense, posture, motor skill, proprioception  to assist with  scapular, scapulothoracic and UE control with self care, reaching, carrying, lifting, house/yardwork, driving/computer work.    Therapeutic Activities:    [] (32020 or 03679) Provided verbal/tactile cueing for activities related to improving balance, coordination, kinesthetic sense, posture, motor skill, proprioception and motor activation to allow for proper function of scapular, scapulothoracic and UE control with self care, carrying, lifting, driving/computer work.      Home Exercise Program:    [x] (24421) Reviewed/Progressed HEP activities related to strengthening, flexibility, endurance, ROM of scapular, scapulothoracic and UE control with self care, reaching, carrying, lifting, house/yardwork, driving/computer work  [] (62454) Reviewed/Progressed HEP activities related to improving balance, coordination, kinesthetic sense, posture, motor skill, proprioception of scapular, scapulothoracic and UE control with self care, reaching, carrying, lifting, house/yardwork, driving/computer work      Manual Treatments:  PROM / STM / Oscillations-Mobs:  G-I, II, III, IV (PA's, Inf., Post.)  [] (42590) Provided manual therapy to mobilize soft tissue/joints of cervical/CT, scapular GHJ and UE for the purpose of modulating pain, promoting relaxation,  increasing ROM, reducing/eliminating soft tissue swelling/inflammation/restriction, improving soft tissue extensibility and allowing for proper ROM for normal function with self care, reaching, carrying, lifting, house/yardwork, driving/computer work    Modalities: 10/3:  IFC to R ldr x 10' - pt seated    Charges:  Timed Code Treatment Minutes: 53   Total Treatment Minutes: 53       [] EVAL - LOW (24882)   [] EVAL - MOD (27487)  [] EVAL - HIGH (36770)  [] RE-EVAL (19751)  [x] TE (54406) x 1     [] Ionto (50598)  [] NMR (66519) x      [] Vaso (95399)  [x] Manual (71074) x 1     [] Ultrasound  [x] TA (97833) x  1    [] Mech Traction (52811)  [] Gait Training x     [x] ES (un) (70092): 1  [] Aquatic therapy x   [] Other:   [] Group:     If [facility] Please Indicate Time In/Out  CPT Code Time in Time out                                         GOALS:  Therapist goals for Patient:  Short Term Goals: To be achieved in: 2 weeks  1. Independent in HEP and progression per patient tolerance, in order to prevent re-injury. [] Progressing: [x] Met: [] Not Met: [] Adjusted  2. Patient will have a decrease in pain to facilitate improvement in movement, function, and ADLs as indicated by Functional Deficits. [x] Progressing: [] Met: [] Not Met: [] Adjusted     Long Term Goals: To be achieved in: 8 weeks  1. Disability index score of 10% or less for the UEFI or Quick DASH to assist with reaching prior level of function. [x] Progressing: [] Met: [] Not Met: [] Adjusted  2. Patient will demonstrate increased AROM to 180 to allow for proper joint functioning as indicated by patients Functional Deficits. [x] Progressing: [] Met: [] Not Met: [] Adjusted  3. Patient will demonstrate an increase in Strength to 5/5 to allow for proper functional mobility as indicated by patients Functional Deficits. [x] Progressing: [] Met: [] Not Met: [] Adjusted  4. Patient will return to functional activities including lifting overhead without increased symptoms or restriction. [x] Progressing: [] Met: [] Not Met: [] Adjusted    Overall Progression Towards Functional goals/ Treatment Progress Update:  [x] Patient is progressing as expected towards functional goals listed. [] Progression is slowed due to complexities/Impairments listed. [] Progression has been slowed due to co-morbidities.   [] Plan just implemented, too soon to assess goals progression <30days   [] Goals require adjustment due to lack of progress  [] Patient is not progressing as expected and requires additional follow up with physician  [] Other    Persisting Functional Limitations/Impairments:  []Sitting []Standing   []Transfers  []Sleeping   [x]Reaching [x]Lifting   [x]ADLs [x]Housework  [x]Driving [x]Job related tasks  [x]Sports/Recreation []Other:    ASSESSMENT: Pt tolerated tx well. Noted some pain at end ranges with flexion and ER PROM but otherwise no pain or soreness with any strengthening exercises. AROM is progressing. Pt still likes to end session with IFC as this has been helpful in reducing soreness after PROM. Treatment/Activity Tolerance:  [x] Patient tolerated treatment session well  [] Patient limited by fatique  [] Patient limited by pain  [] Patient limited by other medical complications  [] Other:     Prognosis:  [x] Good [] Fair  [] Poor    Patient Requires Follow-up: [x] Yes  [] No    Return to Play:    [x]  N/A     []  Stage 1: Intro to Strength   []  Stage 2: Dynamic Strength and Intro to Plyometrics   []  Stage 3: Advanced Plyometrics and Intro to Throwing   []  Stage 4: Sport specific Training/Return to Sport     []  Ready to Return to Play, [employer]   []  Not Ready for Return to Sports   Comments:      Plan for next treatment session:     PLAN: See eval. PT 2x / week for 8 weeks. [x] Continue per plan of care [] Alter current plan (see comments)  [] Plan of care initiated [] Hold pending MD visit [] Discharge    Electronically signed by: YASMANY [id number]      Note: If patient does not return for scheduled/ recommended follow up visits, this note will serve as a discharge from care along with most recent update on progress.

## 2022-10-04 ENCOUNTER — OFFICE VISIT (OUTPATIENT)
Dept: ORTHOPEDIC SURGERY | Age: 47
End: 2022-10-04
Payer: COMMERCIAL

## 2022-10-04 VITALS — HEIGHT: 68 IN | WEIGHT: 260 LBS | BODY MASS INDEX: 39.4 KG/M2

## 2022-10-04 DIAGNOSIS — M79.672 LEFT FOOT PAIN: ICD-10-CM

## 2022-10-04 DIAGNOSIS — Q66.52 CONGENITAL PES PLANUS OF LEFT FOOT: Primary | ICD-10-CM

## 2022-10-04 PROCEDURE — 99203 OFFICE O/P NEW LOW 30 MIN: CPT | Performed by: ORTHOPAEDIC SURGERY

## 2022-10-04 NOTE — PROGRESS NOTES
CHIEF COMPLAINT: Left posterior-medial ankle pain/ congenital flatfoot deformity with hindfoot arthritis. HISTORY:  Mr. Johana Garcia 50 y.o.  male presents today for consultation request from Tj Andrew MD for evaluation of left posterior-medial ankle pain which started years ago. He is using AFO brace with UCBL type insert. He is complaining of achy pain, 4/10. Pain is increase with standing and walking and shoe wear. Pain is sharp with first few steps, dull achy pain by the end of the day. The pain radiates to medial leg, and no numbness and tingling sensation. No other complaint.       Past Medical History:   Diagnosis Date    Asthma     SEASONAL    Bronchitis, acute     Disorder of skin and subcutaneous tissue     Hydrocephalus, congenital (HCC)     Hypertension     Need for prophylactic vaccination against Streptococcus pneumoniae (pneumococcus)     Need for prophylactic vaccination and inoculation against influenza     Need for prophylactic vaccination or inoculation against diphtheria and tetanus     Osteoarthrosis, unspecified whether generalized or localized, unspecified site     foot and ankle    Pharyngitis     PONV (postoperative nausea and vomiting)     Seizure ([address]     started as infant, then none x 30 yrs, none since 2009    Seizures ([address] 06/05/2018    LAST SEIZURE     Sinusitis, acute     Upper respiratory infection        Past Surgical History:   Procedure Laterality Date    FOOT SURGERY Right     gastrocnemius recession    HYDROCELE EXCISION Right 06/2017    OTHER SURGICAL HISTORY      URETHROPLASTY X 2    URETHRAL STRICTURE DILATATION      x2    VENTRICULOPERITONEAL SHUNT         Social History     Socioeconomic History    Marital status:      Spouse name: Not on file    Number of children: Not on file    Years of education: Not on file    Highest education level: Not on file   Occupational History    Not on file   Tobacco Use    Smoking status: Former     Types: Cigarettes     Quit date: 5/10/1996     Years since quittin.4    Smokeless tobacco: Never   Vaping Use    Vaping Use: Never used   Substance and Sexual Activity    Alcohol use: Yes     Alcohol/week: 5.0 standard drinks     Types: 5 Cans of beer per week     Comment: 5 BEERS WEEKLY    Drug use: No    Sexual activity: Not on file   Other Topics Concern    Not on file   Social History Narrative    Not on file     Social Determinants of Health     Financial Resource Strain: Low Risk     Difficulty of Paying Living Expenses: Not hard at all   Food Insecurity: No Food Insecurity    Worried About Running Out of Food in the Last Year: Never true    Ran Out of Food in the Last Year: Never true   Transportation Needs: No Transportation Needs    Lack of Transportation (Medical): No    Lack of Transportation (Non-Medical): No   Physical Activity: Inactive    Days of Exercise per Week: 0 days    Minutes of Exercise per Session: 0 min   Stress: No Stress Concern Present    Feeling of Stress : Only a little   Social Connections: Socially Integrated    Frequency of Communication with Friends and Family: More than three times a week    Frequency of Social Gatherings with Friends and Family:  Three times a week    Attends Zoroastrianism Services: More than 4 times per year    Active Member of Clubs or Organizations: Yes    Attends Club or Organization Meetings: More than 4 times per year    Marital Status:    Intimate Partner Violence: Not on file   Housing Stability: Low Risk     Unable to Pay for Housing in the Last Year: No    Number of Places Lived in the Last Year: 2    Unstable Housing in the Last Year: No       Family History   Problem Relation Age of Onset    Rheum Arthritis Mother     High Blood Pressure Mother     Cancer Maternal Grandmother         lung    Other Maternal Grandfather         parkinsons disease    Heart Disease Father        Current Outpatient Medications on File Prior to Visit   Medication Sig Dispense Refill    traZODone (DESYREL) 50 MG tablet TAKE 1 TABLETS BY MOUTH AT BEDTIME AS NEEDED FOR INSOMNIA 90 tablet 3    omeprazole (PRILOSEC) 40 MG delayed release capsule TAKE 1 CAPSULE BY MOUTH  capsule 3    meloxicam (MOBIC) 15 MG tablet TAKE 1 TABLET BY MOUTH DAILY 90 tablet 3    levETIRAcetam (KEPPRA) 1000 MG tablet TAKE 1 TABLET BY MOUTH TWICE A  tablet 3    albuterol sulfate HFA (PROVENTIL;VENTOLIN;PROAIR) 108 (90 Base) MCG/ACT inhaler INHALE 2 PUFFS EVERY SIX HOURS AS NEEDED FOR WHEEZING 20.1 each 3    melatonin 3 MG TABS tablet Take 2 tablets by mouth nightly as needed (insomnia) Take 6 mg by mouth nightly as needed 180 tablet 3    cetirizine (ZYRTEC) 10 MG tablet Take 1 tablet by mouth in the morning. Take 10 mg by mouth dailyTake 10 mg by mouth daily. 90 tablet 3    lisinopril-hydroCHLOROthiazide (PRINZIDE;ZESTORETIC) 20-12.5 MG per tablet Take 1 tablet by mouth in the morning. 90 tablet 3    amLODIPine (NORVASC) 5 MG tablet TAKE 1 TABLET BY MOUTH EVERY DAY 90 tablet 3     No current facility-administered medications on file prior to visit. Pertinent items are noted in HPI  Review of systems reviewed from Patient History Form and available in the patient's chart under the Media tab. No change noted. PHYSICAL EXAMINATION:  Mr. Millicent Taylor is a very pleasant 50 y.o.  male who presents today in no acute distress, awake, alert, and oriented. He is well dressed, nourished and  groomed. Patient with normal affect. Height is  5' 8\" (1.727 m), weight is 260 lb (117.9 kg), Body mass index is 39.53 kg/m². Resting respiratory rate is 16. Examination of the gait, showed that the patient walks heel-toe with a minimal limp. Examination of both ankles showing dorsiflexion to about 10 degrees bilaterally, which increased with knee flexion. He has intact sensation and good pedal pulses.   He has weak inversion, and has mild tenderness on deep palpation over the medial hindfoot with swelling and hindfoot valgus compared to the other side. The ankles are stable to drawer test bilaterally, equally. He unable to perform single leg toe raise. IMAGING:Xray's were reviewed, 3 views of the left foot and ankle taken in office today, and showed no acute fracture. Pes planus, with 30% of talar head uncovered. Talonavicular and midfoot arthritis. IMPRESSION: Left posterior-medial ankle pain/ congenital flatfoot deformity with hindfoot arthritis. PLAN: I discussed with the patient all the treatment options, and natural history of congenital flatfoot deformity. We recommended stretching exercises of the calf which was taught to the patient today. He will take NSAIDS. I believe he will benefit from UCBL brace, and Rx given to him today and instructed him in care. We discussed the risk of progression. We will see him  back in 3-4 months PRN. Thank you very much for the kind consultation and allowing me to participate in this patient's care. I will continue to keep you apprised of his progress.         Korin Gaffney MD

## 2022-10-05 ENCOUNTER — HOSPITAL ENCOUNTER (OUTPATIENT)
Dept: PHYSICAL THERAPY | Age: 47
Setting detail: THERAPIES SERIES
Discharge: HOME | End: 2022-10-05
Payer: COMMERCIAL

## 2022-10-05 PROCEDURE — 97110 THERAPEUTIC EXERCISES: CPT

## 2022-10-05 PROCEDURE — 97530 THERAPEUTIC ACTIVITIES: CPT

## 2022-10-05 PROCEDURE — 97140 MANUAL THERAPY 1/> REGIONS: CPT

## 2022-10-05 NOTE — FLOWSHEET NOTE
[facility]  Phone: [phone]  Fax: [phone]    Physical Therapy Treatment Note/ Progress Report:     Date:  10/5/2022    Patient Name:  Chris Ramirez  \"WILL\" :  1975  MRN: [id number]  Restrictions/Precautions:    Medical/Treatment Diagnosis Information:  Adhesive capsulitis of right shoulder (M75.01)  Treatment Diagnosis: Decreased Right Shoulder AROM & Strength  Insurance/Certification information:  PT Insurance Information: [employer] (Med [employer]ec)  Physician Information:  Donald Ross MD  Plan of care signed (Y/N): []  Yes  [x]  No     Date of Patient follow up with Physician:      Progress Report: [x]  Yes  []  No     Date Range for reporting period:  Beginnin22  Ending:     Progress report due (10 Rx/or 30 days whichever is less):      Recertification due (POC duration/ or 90 days whichever is less):  22    Visit # Insurance Allowable Auth required? Date Range    [place] []  Yes  []  No PCY     Latex Allergy:  [x]NO      []YES  Preferred Language for Healthcare:   [x]English       []other:    Functional Scale:        Date assessed:  TO physical FS primary measure score = 53; risk adjusted 53 22      Pain level: 3/10     SUBJECTIVE: Pt reports that he is doing alright today. Feels like he is improving just still having pain. Wants to avoid an injection. OBJECTIVE:   22: Supine R shoulder AROM Flexion: 160 degrees.     :   R shldr PROM: 154 flex, 131 abd, 56 ER, 70 IR  R shldr MMT: 5 flex, 4+ abd, 5 ER/IR, 5 bic/tri    RESTRICTIONS/PRECAUTIONS:     Exercises/Interventions:   R shldr  Therapeutic Exercise (13349) Resistance / level Sets / Seconds  Reps Notes/Cues   UBE  2' for     Pulleys   10 flex, scap Add IR behind back   Ball on table roll green     Wall wash flex  Ball on wall circles   red     Cane AAROM: IR behind back, ext, abd   HEP          3-way curls  B ER  Bent row 5#  5#  5#  20 GOALS:  Therapist goals for Patient:  Short Term Goals: To be achieved in: 2 weeks  1. Independent in HEP and progression per patient tolerance, in order to prevent re-injury. [] Progressing: [x] Met: [] Not Met: [] Adjusted  2. Patient will have a decrease in pain to facilitate improvement in movement, function, and ADLs as indicated by Functional Deficits. [x] Progressing: [] Met: [] Not Met: [] Adjusted     Long Term Goals: To be achieved in: 8 weeks  1. Disability index score of 10% or less for the UEFI or Quick DASH to assist with reaching prior level of function. [x] Progressing: [] Met: [] Not Met: [] Adjusted  2. Patient will demonstrate increased AROM to 180 to allow for proper joint functioning as indicated by patients Functional Deficits. [x] Progressing: [] Met: [] Not Met: [] Adjusted  3. Patient will demonstrate an increase in Strength to 5/5 to allow for proper functional mobility as indicated by patients Functional Deficits. [x] Progressing: [] Met: [] Not Met: [] Adjusted  4. Patient will return to functional activities including lifting overhead without increased symptoms or restriction. [x] Progressing: [] Met: [] Not Met: [] Adjusted    Overall Progression Towards Functional goals/ Treatment Progress Update:  [x] Patient is progressing as expected towards functional goals listed. [] Progression is slowed due to complexities/Impairments listed. [] Progression has been slowed due to co-morbidities.   [] Plan just implemented, too soon to assess goals progression <30days   [] Goals require adjustment due to lack of progress  [] Patient is not progressing as expected and requires additional follow up with physician  [] Other    Persisting Functional Limitations/Impairments:  []Sitting []Standing   []Transfers  []Sleeping   [x]Reaching [x]Lifting   [x]ADLs [x]Housework  [x]Driving [x]Job related tasks  [x]Sports/Recreation []Other:    ASSESSMENT: Pt tolerated tx well, no pain or soreness noted during session. Did well with CC shoulder strengthening exercises. ER PROM much better today, tight into flexion and abduction. Pt still likes to end session with IFC as this has been helpful in reducing soreness after PROM. Treatment/Activity Tolerance:  [x] Patient tolerated treatment session well  [] Patient limited by fatique  [] Patient limited by pain  [] Patient limited by other medical complications  [] Other:     Prognosis:  [x] Good [] Fair  [] Poor    Patient Requires Follow-up: [x] Yes  [] No    Return to Play:    [x]  N/A     []  Stage 1: Intro to Strength   []  Stage 2: Dynamic Strength and Intro to Plyometrics   []  Stage 3: Advanced Plyometrics and Intro to Throwing   []  Stage 4: Sport specific Training/Return to Sport     []  Ready to Return to Play, [employer]   []  Not Ready for Return to Sports   Comments:      Plan for next treatment session:     PLAN: See eval. PT 2x / week for 8 weeks. [x] Continue per plan of care [] Alter current plan (see comments)  [] Plan of care initiated [] Hold pending MD visit [] Discharge    Electronically signed by: KIRK [id number]      Note: If patient does not return for scheduled/ recommended follow up visits, this note will serve as a discharge from care along with most recent update on progress.

## 2022-10-10 ENCOUNTER — HOSPITAL ENCOUNTER (OUTPATIENT)
Dept: PHYSICAL THERAPY | Age: 47
Setting detail: THERAPIES SERIES
Discharge: HOME | End: 2022-10-10
Payer: COMMERCIAL

## 2022-10-10 PROCEDURE — 97530 THERAPEUTIC ACTIVITIES: CPT

## 2022-10-10 PROCEDURE — 97110 THERAPEUTIC EXERCISES: CPT

## 2022-10-10 PROCEDURE — 97140 MANUAL THERAPY 1/> REGIONS: CPT

## 2022-10-10 NOTE — FLOWSHEET NOTE
[facility]  Phone: [phone]  Fax: [phone]    Physical Therapy Treatment Note/ Progress Report:     Date:  10/10/2022    Patient Name:  Juanita Blake  \"WILL\" :  1975  MRN: [id number]  Restrictions/Precautions:    Medical/Treatment Diagnosis Information:  Adhesive capsulitis of right shoulder (M75.01)  Treatment Diagnosis: Decreased Right Shoulder AROM & Strength  Insurance/Certification information:  PT Insurance Information: [employer] ([employer])  Physician Information:  Karen Clayton MD  Plan of care signed (Y/N): []  Yes  [x]  No     Date of Patient follow up with Physician:      Progress Report: [x]  Yes  []  No     Date Range for reporting period:  Beginnin22  Ending:     Progress report due (10 Rx/or 30 days whichever is less):  05    Recertification due (POC duration/ or 90 days whichever is less):  22    Visit # Insurance Allowable Auth required? Date Range   15/16 [place] []  Yes  []  No PCY     Latex Allergy:  [x]NO      []YES  Preferred Language for Healthcare:   [x]English       []other:    Functional Scale:        Date assessed:  FOTO physical FS primary measure score = 53; risk adjusted 53 22      Pain level: 3/10     SUBJECTIVE: Pt reports that he had a busy weekend, lots of work around the house, put together a bench and cleaned out garage. Arm and shoulder a little bit sore from that but doing well. OBJECTIVE:   10/10: R shldr ER 72  22: Supine R shoulder AROM Flexion: 160 degrees.     :   R shldr PROM: 154 flex, 131 abd, 56 ER, 70 IR  R shldr MMT: 5 flex, 4+ abd, 5 ER/IR, 5 bic/tri    RESTRICTIONS/PRECAUTIONS:     Exercises/Interventions:   R shldr  Therapeutic Exercise (39171) Resistance / level Sets / Seconds  Reps Notes/Cues   UBE  2' for     Pulleys   10 flex, scap, IR behind back    Ball on table roll green     Wall wash flex  Ball on wall circles   red     Cane AAROM: IR behind back, ext, abd   HEP          3-way curls  B ER  Bent row 5#  5#  5#  20 ea  15  20 R           Doorway pec stretch      SA punches  SL ER 3#  3#            Therapeutic Activities (84504)       CC: Mid row  Shldr ext  [employer] add  IR/ER  High row  PNF   10#  5#  5#  5#  12.5#  7.5#    20  20  20  20 R  20  10 R    Shoulder flex / scap to 90      Wall push up   2x10                  Neuromuscular Re-ed (46466)                                          Manual Intervention (44940)       R shldr PROM  10'     [address] mobs / gentle distraction      Manual STM to R delt  5'                              Pt. Education:  -pt educated on diagnosis, prognosis and expectations for rehab  -all pt questions were answered    Home Exercise Program:  9/9: shoulder flexion wall wash, tband mid row  8/8/22: The following exercises were performed and added to the pt's home program (educated on appropriate frequency, intensity and duration etc.):    Access Code: [email or web address]  URL: [email or web address]  Date: 08/12/2022  Prepared by: Juan Miguel Walker     Exercises  Supine Shoulder Flexion with Dowel - 1 x daily - 7 x weekly - 3 sets - 10 reps  Standing Shoulder Abduction AAROM with Dowel - 1 x daily - 7 x weekly - 3 sets - 10 reps  Supine Shoulder External Rotation in 45 Degrees Abduction AAROM with Dowel - 1 x daily - 7 x weekly - 3 sets - 10 reps  Seated Upper Trapezius Stretch - 1 x daily - 7 x weekly - 3 sets - 10 reps    Therapeutic Exercise and NMR EXR  [x] (80054) Provided verbal/tactile cueing for activities related to strengthening, flexibility, endurance, ROM  for improvements in scapular, scapulothoracic and UE control with self care, reaching, carrying, lifting, house/yardwork, driving/computer work.    [] (68926) Provided verbal/tactile cueing for activities related to improving balance, coordination, kinesthetic sense, posture, motor skill, proprioception  to assist with  scapular, scapulothoracic and UE control with self care, reaching, carrying, lifting, house/yardwork, driving/computer work. Therapeutic Activities:    [] (76352 or 66493) Provided verbal/tactile cueing for activities related to improving balance, coordination, kinesthetic sense, posture, motor skill, proprioception and motor activation to allow for proper function of scapular, scapulothoracic and UE control with self care, carrying, lifting, driving/computer work.      Home Exercise Program:    [x] (24741) Reviewed/Progressed HEP activities related to strengthening, flexibility, endurance, ROM of scapular, scapulothoracic and UE control with self care, reaching, carrying, lifting, house/yardwork, driving/computer work  [] (46265) Reviewed/Progressed HEP activities related to improving balance, coordination, kinesthetic sense, posture, motor skill, proprioception of scapular, scapulothoracic and UE control with self care, reaching, carrying, lifting, house/yardwork, driving/computer work      Manual Treatments:  PROM / STM / Oscillations-Mobs:  G-I, II, III, IV (PA's, Inf., Post.)  [] (19371) Provided manual therapy to mobilize soft tissue/joints of cervical/CT, scapular GHJ and UE for the purpose of modulating pain, promoting relaxation,  increasing ROM, reducing/eliminating soft tissue swelling/inflammation/restriction, improving soft tissue extensibility and allowing for proper ROM for normal function with self care, reaching, carrying, lifting, house/yardwork, driving/computer work    Modalities: 10/10: IFC to R ldr x 10' - pt seated    Charges:  Timed Code Treatment Minutes: 50   Total Treatment Minutes: 50       [] EVAL - LOW (14683)   [] EVAL - MOD (31684)  [] EVAL - HIGH (19279)  [] RE-EVAL (17474)  [x] TE (38345) x 1     [] Ionto (95821)  [] NMR (51419) x      [] Vaso (30603)  [x] Manual (57338) x 1     [] Ultrasound  [x] TA (76026) x  1    [] Mech Traction (05556)  [] Gait Training x     [x] ES (un) (01728):   [] Aquatic therapy x   [] Other:   [] Group:     If BW Please Indicate Time In/Out  CPT Code Time in Time out                                         GOALS:  Therapist goals for Patient:  Short Term Goals: To be achieved in: 2 weeks  1. Independent in HEP and progression per patient tolerance, in order to prevent re-injury. [] Progressing: [x] Met: [] Not Met: [] Adjusted  2. Patient will have a decrease in pain to facilitate improvement in movement, function, and ADLs as indicated by Functional Deficits. [x] Progressing: [] Met: [] Not Met: [] Adjusted     Long Term Goals: To be achieved in: 8 weeks  1. Disability index score of 10% or less for the UEFI or Quick DASH to assist with reaching prior level of function. [x] Progressing: [] Met: [] Not Met: [] Adjusted  2. Patient will demonstrate increased AROM to 180 to allow for proper joint functioning as indicated by patients Functional Deficits. [x] Progressing: [] Met: [] Not Met: [] Adjusted  3. Patient will demonstrate an increase in Strength to 5/5 to allow for proper functional mobility as indicated by patients Functional Deficits. [x] Progressing: [] Met: [] Not Met: [] Adjusted  4. Patient will return to functional activities including lifting overhead without increased symptoms or restriction. [x] Progressing: [] Met: [] Not Met: [] Adjusted    Overall Progression Towards Functional goals/ Treatment Progress Update:  [x] Patient is progressing as expected towards functional goals listed. [] Progression is slowed due to complexities/Impairments listed. [] Progression has been slowed due to co-morbidities.   [] Plan just implemented, too soon to assess goals progression <30days   [] Goals require adjustment due to lack of progress  [] Patient is not progressing as expected and requires additional follow up with physician  [] Other    Persisting Functional Limitations/Impairments:  []Sitting []Standing   []Transfers []Sleeping   [x]Reaching [x]Lifting   [x]ADLs [x]Housework  [x]Driving [x]Job related tasks  [x]Sports/Recreation []Other:    ASSESSMENT: Pt had fair exercise tolerance. Noted more tightness and pain with flexion and abduction PROM today, probably sore from activities over the weekend; but demonstrated improved ER. Did well with strengthening exercises. Added manual STM along R deltoid and bicep and continued with IFC to reduce soreness after PROM. Treatment/Activity Tolerance:  [x] Patient tolerated treatment session well  [] Patient limited by fatique  [] Patient limited by pain  [] Patient limited by other medical complications  [] Other:     Prognosis:  [x] Good [] Fair  [] Poor    Patient Requires Follow-up: [x] Yes  [] No    Return to Play:    [x]  N/A     []  Stage 1: Intro to Strength   []  Stage 2: Dynamic Strength and Intro to Plyometrics   []  Stage 3: Advanced Plyometrics and Intro to Throwing   []  Stage 4: Sport specific Training/Return to Sport     []  Ready to Return to Play, License [employer]   []  Not Ready for Return to Sports   Comments:      Plan for next treatment session: progress note    PLAN: See nimo. PT 2x / week for 8 weeks. [x] Continue per plan of care [] Alter current plan (see comments)  [] Plan of care initiated [] Hold pending MD visit [] Discharge    Electronically signed by: Sabi Perez [id number]      Note: If patient does not return for scheduled/ recommended follow up visits, this note will serve as a discharge from care along with most recent update on progress.

## 2022-10-13 ENCOUNTER — HOSPITAL ENCOUNTER (OUTPATIENT)
Dept: PHYSICAL THERAPY | Age: 47
Setting detail: THERAPIES SERIES
Discharge: HOME | End: 2022-10-13
Payer: COMMERCIAL

## 2022-10-13 PROCEDURE — 97140 MANUAL THERAPY 1/> REGIONS: CPT

## 2022-10-13 PROCEDURE — 97530 THERAPEUTIC ACTIVITIES: CPT

## 2022-10-13 PROCEDURE — 97110 THERAPEUTIC EXERCISES: CPT

## 2022-10-13 NOTE — FLOWSHEET NOTE
[facility]  Phone: [phone]  Fax: [phone]    Physical Therapy Treatment Note/ Progress Report:     Date:  10/13/2022    Patient Name:  Eric Lowery  \"WILL\" :  1975  MRN: [id number]  Restrictions/Precautions:    Medical/Treatment Diagnosis Information:  Adhesive capsulitis of right shoulder (M75.01)  Treatment Diagnosis: Decreased Right Shoulder AROM & Strength  Insurance/Certification information:  PT Insurance Information: [employer] ([employer])  Physician Information:  Anjel Babcock MD  Plan of care signed (Y/N): []  Yes  [x]  No     Date of Patient follow up with Physician:      Progress Report: [x]  Yes  []  No     Date Range for reporting period:  Beginnin22  Ending:     Progress report due (10 Rx/or 30 days whichever is less):      Recertification due (POC duration/ or 90 days whichever is less):  22    Visit # Insurance Allowable Auth required? Date Range    [place] []  Yes  []  No PCY     Latex Allergy:  [x]NO      []YES  Preferred Language for Healthcare:   [x]English       []other:    Functional Scale:        Date assessed:  TO physical FS primary measure score = 53; risk adjusted 53 22      Pain level: 4/10     SUBJECTIVE: Pt reports that his arm feels tight today, is excited to see his godchildren in [place] this weekend. Pain no longer keeps him up at night. While he can reach the upper cabinets, which he couldn't before, it is still sore and he can't lift something up overhead without pain. OBJECTIVE:   10/13:  R shldr AROM: flex 165, abd 155, IR 74, ER 59  R shldr MMT: 5 all!    10/10: R shldr ER 72  22: Supine R shoulder AROM Flexion: 160 degrees.    : R shldr PROM: 154 flex, 131 abd, 56 ER, 70 IR  R shldr MMT: 5 flex, 4+ abd, 5 ER/IR, 5 bic/tri    RESTRICTIONS/PRECAUTIONS:     Exercises/Interventions:   R shldr  Therapeutic Exercise (92560) Resistance / level Sets / Seconds  Reps Notes/Cues   UBE  2' for     Pulleys   10 flex, scap, IR behind back    Ball on table roll green  20 ea    Wall wash flex  Ball on wall circles   red  20 flex  10 cw, ccw    Cane AAROM: IR behind back, ext, abd   HEP          3-way curls  B ER  Bent row 5#  5#  5#  20 ea  15  20 R           Doorway pec stretch  20\"x3     SA punches  SL ER  Sleeper stretch 3#  3#  20  20 R           Therapeutic Activities (08812)       CC: Mid row  Shldr ext  [employer] add  IR/ER  High row  PNF   15#  5#  5#  5#  15#  7.5#    20  20  20  20 R  20  15 R    Shoulder flex / scap to 90      Wall push up   2x10                  Neuromuscular Re-ed (39694)       Prone scap strength: I/Y/T add                                  Manual Intervention (99374)       R shldr PROM  10'     [address] mobs / gentle distraction      Manual STM to R delt  5'                              Pt. Education:  -pt educated on diagnosis, prognosis and expectations for rehab  -all pt questions were answered    Home Exercise Program:  9/9: shoulder flexion wall wash, tband mid row  8/8/22: The following exercises were performed and added to the pt's home program (educated on appropriate frequency, intensity and duration etc.):    Access Code: [email or web address]  URL: [email or web address]  Date: 08/12/2022  Prepared by: Sonia Nuñez     Exercises  Supine Shoulder Flexion with Dowel - 1 x daily - 7 x weekly - 3 sets - 10 reps  Standing Shoulder Abduction AAROM with Dowel - 1 x daily - 7 x weekly - 3 sets - 10 reps  Supine Shoulder External Rotation in 45 Degrees Abduction AAROM with Dowel - 1 x daily - 7 x weekly - 3 sets - 10 reps  Seated Upper Trapezius Stretch - 1 x daily - 7 x weekly - 3 sets - 10 reps    Therapeutic Exercise and NMR EXR  [x] (38308) Provided verbal/tactile cueing for activities related to strengthening, flexibility, endurance, ROM  for improvements in scapular, scapulothoracic and UE control with self care, reaching, carrying, lifting, house/yardwork, driving/computer work.    [] (55302) Provided verbal/tactile cueing for activities related to improving balance, coordination, kinesthetic sense, posture, motor skill, proprioception  to assist with  scapular, scapulothoracic and UE control with self care, reaching, carrying, lifting, house/yardwork, driving/computer work. Therapeutic Activities:    [] (66216 or 12413) Provided verbal/tactile cueing for activities related to improving balance, coordination, kinesthetic sense, posture, motor skill, proprioception and motor activation to allow for proper function of scapular, scapulothoracic and UE control with self care, carrying, lifting, driving/computer work.      Home Exercise Program:    [x] (84864) Reviewed/Progressed HEP activities related to strengthening, flexibility, endurance, ROM of scapular, scapulothoracic and UE control with self care, reaching, carrying, lifting, house/yardwork, driving/computer work  [] (33666) Reviewed/Progressed HEP activities related to improving balance, coordination, kinesthetic sense, posture, motor skill, proprioception of scapular, scapulothoracic and UE control with self care, reaching, carrying, lifting, house/yardwork, driving/computer work      Manual Treatments:  PROM / STM / Oscillations-Mobs:  G-I, II, III, IV (PA's, Inf., Post.)  [] (45469) Provided manual therapy to mobilize soft tissue/joints of cervical/CT, scapular GHJ and UE for the purpose of modulating pain, promoting relaxation,  increasing ROM, reducing/eliminating soft tissue swelling/inflammation/restriction, improving soft tissue extensibility and allowing for proper ROM for normal function with self care, reaching, carrying, lifting, house/yardwork, driving/computer work    Modalities: 10/13: IFC to R shldr x 10' - pt seated    Charges:  Timed Code Treatment Minutes: 50   Total Treatment Minutes: 50       [] EVAL - LOW (18458)   [] EVAL - MOD (48446)  [] EVAL - HIGH (16113)  [] RE-EVAL (21497)  [x] TE [phone]) x 1     [] Ionto (84812)  [] NMR (31661) x      [] Vaso (45373)  [x] Manual (54210) x 1     [] Ultrasound  [x] TA (53496) x  1    [] Mech Traction (82957)  [] Gait Training x     [x] ES (un) (71395):   [] Aquatic therapy x   [] Other:   [] Group:     If BW Please Indicate Time In/Out  CPT Code Time in Time out                                         GOALS:  Therapist goals for Patient:  Short Term Goals: To be achieved in: 2 weeks  1. Independent in HEP and progression per patient tolerance, in order to prevent re-injury. [] Progressing: [x] Met: [] Not Met: [] Adjusted  2. Patient will have a decrease in pain to facilitate improvement in movement, function, and ADLs as indicated by Functional Deficits. [] Progressing: [x] Met: [] Not Met: [] Adjusted     Long Term Goals: To be achieved in: 8 weeks  1. Disability index score of 10% or less for the UEFI or Quick DASH to assist with reaching prior level of function. [x] Progressing: [] Met: [] Not Met: [] Adjusted  2. Patient will demonstrate increased AROM to 180 to allow for proper joint functioning as indicated by patients Functional Deficits. [x] Progressing: [] Met: [] Not Met: [] Adjusted  3. Patient will demonstrate an increase in Strength to 5/5 to allow for proper functional mobility as indicated by patients Functional Deficits. [] Progressing: [x] Met: [] Not Met: [] Adjusted  4. Patient will return to functional activities including lifting overhead without increased symptoms or restriction. [x] Progressing: [] Met: [] Not Met: [] Adjusted    Overall Progression Towards Functional goals/ Treatment Progress Update:  [x] Patient is progressing as expected towards functional goals listed. [] Progression is slowed due to complexities/Impairments listed. [] Progression has been slowed due to co-morbidities.   [] Plan just implemented, too soon to assess goals progression <30days   [] Goals require adjustment due to lack of progress  [] Patient is not progressing as expected and requires additional follow up with physician  [] Other    Persisting Functional Limitations/Impairments:  []Sitting []Standing   []Transfers  []Sleeping   [x]Reaching [x]Lifting   [x]ADLs [x]Housework  [x]Driving [x]Job related tasks  [x]Sports/Recreation []Other:    ASSESSMENT: Pt with increased tightness noted especially with pulleys flexion and IR behind the back stretch. Required a longer warmup and more stretching at start of session d/t tightness (may be d/t weather) but then as session progressed he did very well. Pt has demonstrated improved AROM and strength as well as decreased pain but is still limited by lifting OH and would continue to benefit from skilled PT to further increase his flexion and abduction ROM; also discussed with pt about possibility of scheduling with an ortho in the future. Treatment/Activity Tolerance:  [x] Patient tolerated treatment session well  [] Patient limited by fatique  [] Patient limited by pain  [] Patient limited by other medical complications  [] Other:     Prognosis:  [x] Good [] Fair  [] Poor    Patient Requires Follow-up: [x] Yes  [] No    Return to Play:    [x]  N/A     []  Stage 1: Intro to Strength   []  Stage 2: Dynamic Strength and Intro to Plyometrics   []  Stage 3: Advanced Plyometrics and Intro to Throwing   []  Stage 4: Sport specific Training/Return to Sport     []  Ready to Return to Play, [employer]   []  Not Ready for Return to Sports   Comments:      Plan for next treatment session:     PLAN: See eval. PT 2x / week for 8 weeks. [x] Continue per plan of care [] Alter current plan (see comments)  [] Plan of care initiated [] Hold pending MD visit [] Discharge    Electronically signed by: Leatha Booth, [id number]      Note: If patient does not return for scheduled/ recommended follow up visits, this note will serve as a discharge from care along with most recent update on progress.

## 2022-10-19 ENCOUNTER — HOSPITAL ENCOUNTER (OUTPATIENT)
Dept: PHYSICAL THERAPY | Age: 47
Setting detail: THERAPIES SERIES
Discharge: HOME | End: 2022-10-19
Payer: COMMERCIAL

## 2022-10-19 PROCEDURE — 97140 MANUAL THERAPY 1/> REGIONS: CPT

## 2022-10-19 PROCEDURE — 97110 THERAPEUTIC EXERCISES: CPT

## 2022-10-19 PROCEDURE — 97530 THERAPEUTIC ACTIVITIES: CPT

## 2022-10-19 NOTE — FLOWSHEET NOTE
Physical Therapy Re-Certification Plan of Care    Dear Blaine Hinton MD,    We had the pleasure of treating the following patient for physical therapy services at [address] [place] [address]. A summary of our findings can be found in the updated assessment below. This includes our plan of care. If you have any questions or concerns regarding these findings, please do not hesitate to contact me at the office phone number checked above. Thank you for the referral.     Physician Signature:________________________________Date:__________________  By signing above (or electronic signature), therapists plan is approved by physician      Functional Outcome: FOTO Physical FS score 59 10/20/22  FOTO physical FS primary measure score = 53; risk adjusted 53 22        Overall Response to Treatment:   [x]Patient is responding well to treatment and improvement is noted with regards  to goals   []Patient should continue to improve in reasonable time if they continue HEP   []Patient has plateaued and is no longer responding to skilled PT intervention    []Patient is getting worse and would benefit from return to referring MD   []Patient unable to adhere to initial POC   []Other:     Date range of Visits: 22 - 10/19/22  Total Visits: 16    Recommendation:    [x]Continue PT 2x / wk for 6 weeks.     []Hold PT, pending MD visit    Rigoberto Cast  Phone: [phone]  Fax: [phone]    Physical Therapy Treatment Note/ Progress Report:     Date:  10/19/2022    Patient Name:  Jeffrey Starr  \"WILL\" :  1975  MRN: [id number]  Restrictions/Precautions:    Medical/Treatment Diagnosis Information:  Adhesive capsulitis of right shoulder (M75.01)  Treatment Diagnosis: Decreased Right Shoulder AROM & Strength  Insurance/Certification information:  PT Insurance Information: [employer] ([employer])  Physician Information:  Liana Michael MD  Plan of care signed (Y/N): []  Yes  [x]  No     Date of Patient follow up with Physician:      Progress Report: [x]  Yes  []  No     Date Range for reporting period:  Beginnin22  Ending:     Progress report due (10 Rx/or 30 days whichever is less):  39    Recertification due (POC duration/ or 90 days whichever is less):  22    Visit # Insurance Allowable Auth required? Date Range    [place] []  Yes  []  No PCY     Latex Allergy:  [x]NO      []YES  Preferred Language for Healthcare:   [x]English       []other:    Functional Scale:        Date assessed:  [facility] physical FS primary measure score = 53; risk adjusted 53 22      Pain level: 4/10     SUBJECTIVE: Pt states he has noticed improvements in his range of motion and function. OBJECTIVE: 10/19/22: Supine Right Shoulder AROM: Flexion: aBduction: IR: ER:   Strength: all 5/5   10/13:  R shldr AROM: flex 165, abd 155, IR 74, ER 59  R shldr MMT: 5 all!    10/10: R shldr ER 72  22: Supine R shoulder AROM Flexion: 160 degrees. : R shldr PROM: 154 flex, 131 abd, 56 ER, 70 IR  R shldr MMT: 5 flex, 4+ abd, 5 ER/IR, 5 bic/tri    RESTRICTIONS/PRECAUTIONS:     Exercises/Interventions:   R shldr  Therapeutic Exercise (33112) Resistance / level Sets / Seconds  Reps Notes/Cues   UBE  2' for     Pulleys   10 flex, scap, IR behind back    Ball on table roll green  20 ea    Wall wash flex  Ball on wall circles   red  20 flex  10 cw, ccw    Cane AAROM: IR behind back, ext, abd   HEP          3-way curls  B ER  Bent row 5#  5#  5#  20 ea  15  20 R           Doorway pec stretch  20\"x3     SA punches  SL ER  Sleeper stretch 3#  3#  20  20 R           Therapeutic Activities (22406)       CC:   Mid row  Shldr ext  [employer] add  IR/ER  High row  PNF   15#  5#  5#  5#  15#  7.5#    20  20  20  20 R  20  15 R    Shoulder flex / scap to 90      Wall push up   2x10                  Neuromuscular Re-ed (13554)       Yara over SB 0# x 10 Manual Intervention ([phone])       R shldr PROM  10'     [address] / gentle distraction      Manual STM to R delt  5'                              Pt. Education:  -pt educated on diagnosis, prognosis and expectations for rehab  -all pt questions were answered    Home Exercise Program:  9/9: shoulder flexion wall wash, tband mid row  8/8/22: The following exercises were performed and added to the pt's home program (educated on appropriate frequency, intensity and duration etc.):    Access Code: [email or web address]  URL: [email or web address]  Date: 08/12/2022  Prepared by: Neris Majano     Exercises  Supine Shoulder Flexion with Dowel - 1 x daily - 7 x weekly - 3 sets - 10 reps  Standing Shoulder Abduction AAROM with Dowel - 1 x daily - 7 x weekly - 3 sets - 10 reps  Supine Shoulder External Rotation in 45 Degrees Abduction AAROM with Dowel - 1 x daily - 7 x weekly - 3 sets - 10 reps  Seated Upper Trapezius Stretch - 1 x daily - 7 x weekly - 3 sets - 10 reps    Therapeutic Exercise and NMR EXR  [x] (26995) Provided verbal/tactile cueing for activities related to strengthening, flexibility, endurance, ROM  for improvements in scapular, scapulothoracic and UE control with self care, reaching, carrying, lifting, house/yardwork, driving/computer work.    [] (02819) Provided verbal/tactile cueing for activities related to improving balance, coordination, kinesthetic sense, posture, motor skill, proprioception  to assist with  scapular, scapulothoracic and UE control with self care, reaching, carrying, lifting, house/yardwork, driving/computer work. Therapeutic Activities:    [] (76751 or 19581) Provided verbal/tactile cueing for activities related to improving balance, coordination, kinesthetic sense, posture, motor skill, proprioception and motor activation to allow for proper function of scapular, scapulothoracic and UE control with self care, carrying, lifting, driving/computer work.      Home Exercise Program: [x] (50480) Reviewed/Progressed HEP activities related to strengthening, flexibility, endurance, ROM of scapular, scapulothoracic and UE control with self care, reaching, carrying, lifting, house/yardwork, driving/computer work  [] (22494) Reviewed/Progressed HEP activities related to improving balance, coordination, kinesthetic sense, posture, motor skill, proprioception of scapular, scapulothoracic and UE control with self care, reaching, carrying, lifting, house/yardwork, driving/computer work      Manual Treatments:  PROM / STM / Oscillations-Mobs:  G-I, II, III, IV (PA's, Inf., Post.)  [] (31038) Provided manual therapy to mobilize soft tissue/joints of cervical/CT, scapular GHJ and UE for the purpose of modulating pain, promoting relaxation,  increasing ROM, reducing/eliminating soft tissue swelling/inflammation/restriction, improving soft tissue extensibility and allowing for proper ROM for normal function with self care, reaching, carrying, lifting, house/yardwork, driving/computer work    Modalities: 10/13: IFC to R shldr x 10' - pt seated    Charges:  Timed Code Treatment Minutes: 45   Total Treatment Minutes: 45       [] EVAL - LOW (83545)   [] EVAL - MOD (66982)  [] EVAL - HIGH (23318)  [] RE-EVAL (91231)  [x] TE ([id number]) x 1     [] Ionto (17825)  [] NMR (87043) x      [] Vaso (10267)  [x] Manual (54446) x 1     [] Ultrasound  [] TA (03231) x  1    [] Mech Traction (65719)  [] Gait Training x     [x] ES (un) (80904):   [] Aquatic therapy x   [] Other:   [] Group:     If [facility] Please Indicate Time In/Out  CPT Code Time in Time out                                         GOALS:  Therapist goals for Patient:  Short Term Goals: To be achieved in: 2 weeks  1. Independent in HEP and progression per patient tolerance, in order to prevent re-injury. [] Progressing: [x] Met: [] Not Met: [] Adjusted  2.  Patient will have a decrease in pain to facilitate improvement in movement, function, and ADLs as indicated by Functional Deficits. [] Progressing: [x] Met: [] Not Met: [] Adjusted     Long Term Goals: To be achieved in: 8 weeks  1. Disability index score of 10% or less for the UEFI or Quick DASH to assist with reaching prior level of function. [x] Progressing: [] Met: [] Not Met: [] Adjusted  2. Patient will demonstrate increased AROM to 180 to allow for proper joint functioning as indicated by patients Functional Deficits. [x] Progressing: [] Met: [] Not Met: [] Adjusted  3. Patient will demonstrate an increase in Strength to 5/5 to allow for proper functional mobility as indicated by patients Functional Deficits. [] Progressing: [x] Met: [] Not Met: [] Adjusted  4. Patient will return to functional activities including lifting overhead without increased symptoms or restriction. [x] Progressing: [] Met: [] Not Met: [] Adjusted    Overall Progression Towards Functional goals/ Treatment Progress Update:  [x] Patient is progressing as expected towards functional goals listed. [] Progression is slowed due to complexities/Impairments listed. [] Progression has been slowed due to co-morbidities.   [] Plan just implemented, too soon to assess goals progression <30days   [] Goals require adjustment due to lack of progress  [] Patient is not progressing as expected and requires additional follow up with physician  [] Other    Persisting Functional Limitations/Impairments:  []Sitting []Standing   []Transfers  []Sleeping   [x]Reaching [x]Lifting   [x]ADLs [x]Housework  [x]Driving [x]Job related tasks  [x]Sports/Recreation []Other:    ASSESSMENT: Pt tolerated today's treatment session well     Treatment/Activity Tolerance:  [x] Patient tolerated treatment session well  [] Patient limited by fatique  [] Patient limited by pain  [] Patient limited by other medical complications  [] Other:     Prognosis:  [x] Good [] Fair  [] Poor    Patient Requires Follow-up: [x] Yes  [] No    Return to Play:    [x]  N/A     []  Stage 1: Intro to Strength   []  Stage 2: Dynamic Strength and Intro to Plyometrics   []  Stage 3: Advanced Plyometrics and Intro to Throwing   []  Stage 4: Sport specific Training/Return to Sport     []  Ready to Return to Play, [employer]   []  Not Ready for Return to Sports   Comments:      Plan for next treatment session:     PLAN: See eval. PT 2x / week for 8 weeks. [x] Continue per plan of care [] Alter current plan (see comments)  [] Plan of care initiated [] Hold pending MD visit [] Discharge    Electronically signed by: Divya Gonzáles PT      Note: If patient does not return for scheduled/ recommended follow up visits, this note will serve as a discharge from care along with most recent update on progress.

## 2022-10-20 DIAGNOSIS — G89.29 CHRONIC SHOULDER PAIN, UNSPECIFIED LATERALITY: Primary | ICD-10-CM

## 2022-10-20 DIAGNOSIS — M25.519 CHRONIC SHOULDER PAIN, UNSPECIFIED LATERALITY: Primary | ICD-10-CM

## 2022-10-21 ENCOUNTER — APPOINTMENT (OUTPATIENT)
Dept: PHYSICAL THERAPY | Age: 47
End: 2022-10-21
Payer: COMMERCIAL

## 2022-10-22 SDOH — HEALTH STABILITY: PHYSICAL HEALTH: ON AVERAGE, HOW MANY DAYS PER WEEK DO YOU ENGAGE IN MODERATE TO STRENUOUS EXERCISE (LIKE A BRISK WALK)?: 0 DAYS

## 2022-10-22 ASSESSMENT — SOCIAL DETERMINANTS OF HEALTH (SDOH)

## 2022-10-24 ENCOUNTER — TELEPHONE (OUTPATIENT)
Dept: ORTHOPEDIC SURGERY | Age: 47
End: 2022-10-24

## 2022-10-24 NOTE — PROGRESS NOTES
[address] New Patient Additional Questions       Question 10/22/2022  8:23 AM EDT - Óscar Arthur by Patient    Please list any providers you have seen in the last 12 months and for what reason Dr Gifty Jeffery care. Dr Rawls--foot/ankle evaluation    In the past 12 Months have you had any of the following symptoms? Headaches No    Fainting or passing out No    Sudden loss of vision, strength, or inability to speak No    Hearing loss or ringing in ear(s) No    Nosebleeds No    Coughing for more than 2-4 weeks No    Coughing up blood No    Shortness of breath or wheezing No    Swelling of feet or ankles No    Chest pain, chest pressure or heaviness No    Irregular heartbeat or suden fast heartbeat No    Difficulty swallowing or food \"sticking\" No    Frequent heartburn or indigestion No    Abdominal pain No    Frequent constipation No    Frequent diarrhea No    Rectal bleeding/black stools No    Urinating more than twice per night No    Pain in joints or bones Yes    Unusual bruising or bleeding No    Seizures, convulsions No    Change in wart, mole or skin growth No    Difficulty sleeping No    Tearfulness No    Difficulty concentrating No    Weight loss more than 5-10 pounds No    Irregular menstrual bleeding No    Menstrual bleeding after menopause No    Breast lumps/discharge from nipple(s) No    Who lives in your home? My wife and I    Do you feel safe at home? Yes    Within the last year, have you been afraid of your partner or ex-partner? No    Within the last year, have you been humiliated or emotionally abused in other ways by your partner or ex-partner? No    Within the last year, have you been kicked, hit, slapped, or otherwise physically hurt by your partner or ex-partner? No    Within the last year, have you been raped or forced to have any kind of sexual activity by your partner or ex-partner?  No    On average, how many days per week do you engage in moderate to strenuous exercise (like a brisk walk)? 0 days    On average, how many minutes do you engage in exercise at this level? Please upload an image of your Drivers License. [Media Unavailable] Upload from 10/22/2022  8:22 AM EDT    Which option best describes your E-cigarette/Vaping usage?  Never Used

## 2022-10-24 NOTE — TELEPHONE ENCOUNTER
Received call from [facility] requesting OV notes from 10/4 visit along with order for orthotic. Both faxed successfully to [phone].

## 2022-10-25 ENCOUNTER — APPOINTMENT (OUTPATIENT)
Dept: PHYSICAL THERAPY | Age: 47
End: 2022-10-25
Payer: COMMERCIAL

## 2022-10-25 ENCOUNTER — OFFICE VISIT (OUTPATIENT)
Dept: ORTHOPEDIC SURGERY | Age: 47
End: 2022-10-25
Payer: COMMERCIAL

## 2022-10-25 VITALS — WEIGHT: 266.2 LBS | RESPIRATION RATE: 16 BRPM | HEIGHT: 68 IN | BODY MASS INDEX: 40.35 KG/M2

## 2022-10-25 DIAGNOSIS — M25.511 CHRONIC RIGHT SHOULDER PAIN: ICD-10-CM

## 2022-10-25 DIAGNOSIS — M75.01 ADHESIVE CAPSULITIS OF RIGHT SHOULDER: Primary | ICD-10-CM

## 2022-10-25 DIAGNOSIS — G89.29 CHRONIC RIGHT SHOULDER PAIN: ICD-10-CM

## 2022-10-25 PROCEDURE — 99203 OFFICE O/P NEW LOW 30 MIN: CPT | Performed by: ORTHOPAEDIC SURGERY

## 2022-10-25 PROCEDURE — 20610 DRAIN/INJ JOINT/BURSA W/O US: CPT | Performed by: ORTHOPAEDIC SURGERY

## 2022-10-25 RX ORDER — BUPIVACAINE HYDROCHLORIDE 2.5 MG/ML
4 INJECTION, SOLUTION INFILTRATION; PERINEURAL ONCE
Status: COMPLETED | OUTPATIENT
Start: 2022-10-25 | End: 2022-10-25

## 2022-10-25 RX ORDER — LIDOCAINE HYDROCHLORIDE 10 MG/ML
4 INJECTION, SOLUTION INFILTRATION; PERINEURAL ONCE
Status: COMPLETED | OUTPATIENT
Start: 2022-10-25 | End: 2022-10-25

## 2022-10-25 RX ORDER — TRIAMCINOLONE ACETONIDE 40 MG/ML
40 INJECTION, SUSPENSION INTRA-ARTICULAR; INTRAMUSCULAR ONCE
Status: COMPLETED | OUTPATIENT
Start: 2022-10-25 | End: 2022-10-25

## 2022-10-25 RX ADMIN — LIDOCAINE HYDROCHLORIDE 4 ML: 10 INJECTION, SOLUTION INFILTRATION; PERINEURAL at 08:32

## 2022-10-25 RX ADMIN — TRIAMCINOLONE ACETONIDE 40 MG: 40 INJECTION, SUSPENSION INTRA-ARTICULAR; INTRAMUSCULAR at 08:33

## 2022-10-25 RX ADMIN — BUPIVACAINE HYDROCHLORIDE 10 MG: 2.5 INJECTION, SOLUTION INFILTRATION; PERINEURAL at 08:32

## 2022-10-25 NOTE — PROGRESS NOTES
CHIEF COMPLAINT: Right shoulder pain    History:    Jeffrey Starr is a 50 y.o. right handed male referred by Blaine Hinton MD  for Sports Medicine consultation for evaluation and treatment of Right shoulder pain. This is evaluated as a personal injury. The pain began 1 year ago. Pain is rated as a 5/10. There was not an injury. Dates he started having some pain after sleeping on a twin bed for 3 days while on vacation. Pain is located laterally and posteriorly. Symptoms are worse with overhead activity, reaching behind his back, laying on side. The patient has had PT at [id number] [place] [address],[address] office for diagnosis of adhesive capsulitis. The patient has not had an injection. The patient has tried NSAIDs, which he uses at baseline for his ankle. . The patient has not tried ice.  Patient's occupation is         Past Medical History:   Diagnosis Date    Asthma     SEASONAL    Bronchitis, acute     Disorder of skin and subcutaneous tissue     Hydrocephalus, congenital ([address]     Hypertension     Need for prophylactic vaccination against Streptococcus pneumoniae (pneumococcus)     Need for prophylactic vaccination and inoculation against influenza     Need for prophylactic vaccination or inoculation against diphtheria and tetanus     Osteoarthrosis, unspecified whether generalized or localized, unspecified site     foot and ankle    Pharyngitis     PONV (postoperative nausea and vomiting)     Seizure ([address]     started as infant, then none x 30 yrs, none since 2009    Seizures ([address] 06/05/2018    LAST SEIZURE     Sinusitis, acute     Upper respiratory infection        Past Surgical History:   Procedure Laterality Date    FOOT SURGERY Right     gastrocnemius recession    HYDROCELE EXCISION Right 06/2017    OTHER SURGICAL HISTORY      URETHROPLASTY X 2    URETHRAL STRICTURE DILATATION      x2    VENTRICULOPERITONEAL SHUNT         Current Outpatient Medications on File Prior to Visit   Medication Sig Dispense Refill    traZODone (DESYREL) 50 MG tablet TAKE 1 TABLETS BY MOUTH AT BEDTIME AS NEEDED FOR INSOMNIA 90 tablet 3    omeprazole (PRILOSEC) 40 MG delayed release capsule TAKE 1 CAPSULE BY MOUTH  capsule 3    meloxicam (MOBIC) 15 MG tablet TAKE 1 TABLET BY MOUTH DAILY 90 tablet 3    levETIRAcetam (KEPPRA) 1000 MG tablet TAKE 1 TABLET BY MOUTH TWICE A  tablet 3    albuterol sulfate HFA (PROVENTIL;VENTOLIN;PROAIR) 108 (90 Base) MCG/ACT inhaler INHALE 2 PUFFS EVERY SIX HOURS AS NEEDED FOR WHEEZING 20.1 each 3    melatonin 3 MG TABS tablet Take 2 tablets by mouth nightly as needed (insomnia) Take 6 mg by mouth nightly as needed 180 tablet 3    cetirizine (ZYRTEC) 10 MG tablet Take 1 tablet by mouth in the morning. Take 10 mg by mouth dailyTake 10 mg by mouth daily. 90 tablet 3    lisinopril-hydroCHLOROthiazide (PRINZIDE;ZESTORETIC) 20-12.5 MG per tablet Take 1 tablet by mouth in the morning. 90 tablet 3    amLODIPine (NORVASC) 5 MG tablet TAKE 1 TABLET BY MOUTH EVERY DAY 90 tablet 3     No current facility-administered medications on file prior to visit. Allergies   Allergen Reactions    Penicillins Rash    Phenobarbital Sodium Diarrhea and Nausea And Vomiting       Social History     Socioeconomic History    Marital status:      Spouse name: Not on file    Number of children: Not on file    Years of education: Not on file    Highest education level: Not on file   Occupational History    Not on file   Tobacco Use    Smoking status: Former     Types: Cigarettes     Quit date: 5/10/1996     Years since quittin.4    Smokeless tobacco: Never   Vaping Use    Vaping Use: Never used   Substance and Sexual Activity    Alcohol use:  Yes     Alcohol/week: 5.0 standard drinks     Types: 5 Cans of beer per week     Comment: 5 BEERS WEEKLY    Drug use: No    Sexual activity: Not on file   Other Topics Concern    Not on file   Social History Narrative    Not on file     Social Determinants of Health     Financial Resource Strain: Low Risk     Difficulty of Paying Living Expenses: Not hard at all   Food Insecurity: No Food Insecurity    Worried About [address] in the Last Year: Never true    Ran Out of Food in the Last Year: Never true   Transportation Needs: No Transportation Needs    Lack of Transportation (Medical): No    Lack of Transportation (Non-Medical): No   Physical Activity: Inactive    Days of Exercise per Week: 0 days    Minutes of Exercise per Session: 0 min   Stress: No Stress Concern Present    Feeling of Stress : Only a little   Social Connections: Socially Integrated    Frequency of Communication with Friends and Family: More than three times a week    Frequency of Social Gatherings with Friends and Family: Three times a week    Attends Mosque Services: More than 4 times per year    Active Member of Clubs or Organizations: Yes    Attends Club or Organization Meetings: More than 4 times per year    Marital Status:    Intimate Partner Violence: Not At Risk    Fear of Current or Ex-Partner: No    Emotionally Abused: No    Physically Abused: No    Sexually Abused: No   Housing Stability: Low Risk     Unable to Pay for Housing in the Last Year: No    Number of [place] in the Last Year: 2    Unstable Housing in the Last Year: No       Family History   Problem Relation Age of Onset    Rheum Arthritis Mother     High Blood Pressure Mother     Cancer Maternal Grandmother         lung    Other Maternal Grandfather         parkinsons disease    Heart Disease Father            Physical Examination:      Vital signs:  Resp 16   Ht 5' 8\" (1.727 m)   Wt 266 lb 3.2 oz (120.7 kg)   BMI 40.48 kg/m²     General:   alert, appears stated age, cooperative, and no distress   Right Shoulder   Active ROM:   forward flexion 160, external rotation 70, internal rotation L4. Left shoulder: forward flexion 180, external rotation 80, internal rotation T10.       Passive ROM:  forward reaction and instructed to apply ice to the area and take NSAIDs if able. Continue NSAIDs as needed. Follow-up in 2 months. Didier Robles. Destini Wilson MD  Orthopaedic Surgery and Sports Medicine     Disclaimer: This note was generated with use of a verbal recognition program and an attempt was made to check for errors. It is possible that there are still dictated errors within this office note. If so, please bring any significant errors to my attention for an addendum. All efforts were made to ensure that this office note is accurate.

## 2022-10-27 ENCOUNTER — HOSPITAL ENCOUNTER (OUTPATIENT)
Dept: PHYSICAL THERAPY | Age: 47
Setting detail: THERAPIES SERIES
Discharge: HOME | End: 2022-10-27
Payer: COMMERCIAL

## 2022-10-27 PROCEDURE — 97110 THERAPEUTIC EXERCISES: CPT

## 2022-10-27 PROCEDURE — 97530 THERAPEUTIC ACTIVITIES: CPT

## 2022-10-27 PROCEDURE — 97140 MANUAL THERAPY 1/> REGIONS: CPT

## 2022-10-27 PROCEDURE — G0283 ELEC STIM OTHER THAN WOUND: HCPCS

## 2022-10-27 NOTE — FLOWSHEET NOTE
[facility]  Phone: [phone]  Fax: [phone]    Physical Therapy Treatment Note/ Progress Report:     Date:  10/27/2022    Patient Name:  Ciarra Rasmussen  \"WILL\" :  1975  MRN: [id number]  Restrictions/Precautions:    Medical/Treatment Diagnosis Information:  Adhesive capsulitis of right shoulder (M75.01)  Treatment Diagnosis: Decreased Right Shoulder AROM & Strength  Insurance/Certification information:  PT Insurance Information: [employer] ([employer])  Physician Information:  Pelon Oneal MD  Plan of care signed (Y/N): []  Yes  [x]  No     Date of Patient follow up with Physician:      Progress Report: [x]  Yes  []  No     Date Range for reporting period:  Beginnin22  Ending:     Progress report due (10 Rx/or 30 days whichever is less):  3/68/21    Recertification due (POC duration/ or 90 days whichever is less):  22    Visit # Insurance Allowable Auth required? Date Range   18 [place] []  Yes  []  No PCY     Latex Allergy:  [x]NO      []YES  Preferred Language for Healthcare:   [x]English       []other:    Functional Scale:        Date assessed:  FOTO physical FS primary measure score = 53; risk adjusted 53 22   FOTO = 59        10/20/22     Pain level: 4/10     SUBJECTIVE: Pt reports that he had a good trip. Got an injection on 10/25 and hasn't really noticed a difference yet. OBJECTIVE:   10/19/22: Supine Right Shoulder AROM: Flexion: aBduction: IR: ER:   Strength: all 5/5   10/13:  R shldr AROM: flex 165, abd 155, IR 74, ER 59  R shldr MMT: 5 all!    10/10: R shldr ER 72  22: Supine R shoulder AROM Flexion: 160 degrees.    : R shldr PROM: 154 flex, 131 abd, 56 ER, 70 IR  R shldr MMT: 5 flex, 4+ abd, 5 ER/IR, 5 bic/tri    RESTRICTIONS/PRECAUTIONS:     Exercises/Interventions:   R shldr  Therapeutic Exercise (96458) Resistance / level Sets / Seconds  Reps Notes/Cues   UBE  2' for     Pulleys   10 flex, scap, IR behind back    Ball on table roll green  20 ea    Wall wash flex  Ball on wall circles   red  20 flex  10 cw, ccw    Cane AAROM: IR behind back, ext, abd   HEP          3-way curls  B ER  Bent row 5#  5#  5#  20 ea  15  20 R           Doorway pec stretch  20\"x3     SA punches  SL ER  Sleeper stretch 4#  4#  20  20 R           Therapeutic Activities (22092)       CC: Mid row  Shldr ext  [employer] add  IR/ER  High row  PNF   15#  5#  5#  5#  15#  7.5#    20  20  20  20 R  20  15 R    Shoulder flex / scap to 90      Wall push up   2x10                  Neuromuscular Re-ed (42669)       Yara over SB 0# x 10                                   Manual Intervention (50606)       R shldr PROM  10'     [address] mobs / gentle distraction      Manual STM to R delt  5'                              Pt. Education:  -pt educated on diagnosis, prognosis and expectations for rehab  -all pt questions were answered    Home Exercise Program:  9/9: shoulder flexion wall wash, tband mid row  8/8/22: The following exercises were performed and added to the pt's home program (educated on appropriate frequency, intensity and duration etc.):    Access Code: [email or web address]  URL: [email or web address]  Date: 08/12/2022  Prepared by: Musa De La O     Exercises  Supine Shoulder Flexion with Dowel - 1 x daily - 7 x weekly - 3 sets - 10 reps  Standing Shoulder Abduction AAROM with Dowel - 1 x daily - 7 x weekly - 3 sets - 10 reps  Supine Shoulder External Rotation in 45 Degrees Abduction AAROM with Dowel - 1 x daily - 7 x weekly - 3 sets - 10 reps  Seated Upper Trapezius Stretch - 1 x daily - 7 x weekly - 3 sets - 10 reps    Therapeutic Exercise and NMR EXR  [x] (75126) Provided verbal/tactile cueing for activities related to strengthening, flexibility, endurance, ROM  for improvements in scapular, scapulothoracic and UE control with self care, reaching, carrying, lifting, house/yardwork, driving/computer work.    [] (08433) Provided verbal/tactile cueing for activities related to improving balance, coordination, kinesthetic sense, posture, motor skill, proprioception  to assist with  scapular, scapulothoracic and UE control with self care, reaching, carrying, lifting, house/yardwork, driving/computer work. Therapeutic Activities:    [] (46282 or 24273) Provided verbal/tactile cueing for activities related to improving balance, coordination, kinesthetic sense, posture, motor skill, proprioception and motor activation to allow for proper function of scapular, scapulothoracic and UE control with self care, carrying, lifting, driving/computer work.      Home Exercise Program:    [x] (42279) Reviewed/Progressed HEP activities related to strengthening, flexibility, endurance, ROM of scapular, scapulothoracic and UE control with self care, reaching, carrying, lifting, house/yardwork, driving/computer work  [] (88939) Reviewed/Progressed HEP activities related to improving balance, coordination, kinesthetic sense, posture, motor skill, proprioception of scapular, scapulothoracic and UE control with self care, reaching, carrying, lifting, house/yardwork, driving/computer work      Manual Treatments:  PROM / STM / Oscillations-Mobs:  G-I, II, III, IV (PA's, Inf., Post.)  [] (75895) Provided manual therapy to mobilize soft tissue/joints of cervical/CT, scapular GHJ and UE for the purpose of modulating pain, promoting relaxation,  increasing ROM, reducing/eliminating soft tissue swelling/inflammation/restriction, improving soft tissue extensibility and allowing for proper ROM for normal function with self care, reaching, carrying, lifting, house/yardwork, driving/computer work    Modalities: 10/27: IFC to R shldr x 10' - pt seated    Charges:  Timed Code Treatment Minutes: 55   Total Treatment Minutes: 55       [] EVAL - LOW (40652)   [] EVAL - MOD (11267)  [] EVAL - HIGH (56432)  [] RE-EVAL (46878)  [x] TE ([id number]) x 1     [] Ionto (77792)  [] NMR (91627) x      [] Vaso (70249)  [x] Manual (17120) x 1     [] Ultrasound  [x] TA (06883) x  1    [] Mech Traction (43784)  [] Gait Training x     [x] ES (un) (55199):   [] Aquatic therapy x   [] Other:   [] Group:     If BWC Please Indicate Time In/Out  CPT Code Time in Time out                                         GOALS:  Therapist goals for Patient:  Short Term Goals: To be achieved in: 2 weeks  1. Independent in HEP and progression per patient tolerance, in order to prevent re-injury. [] Progressing: [x] Met: [] Not Met: [] Adjusted  2. Patient will have a decrease in pain to facilitate improvement in movement, function, and ADLs as indicated by Functional Deficits. [] Progressing: [x] Met: [] Not Met: [] Adjusted     Long Term Goals: To be achieved in: 8 weeks  1. Disability index score of 10% or less for the UEFI or Quick DASH to assist with reaching prior level of function. [x] Progressing: [] Met: [] Not Met: [] Adjusted  2. Patient will demonstrate increased AROM to 180 to allow for proper joint functioning as indicated by patients Functional Deficits. [x] Progressing: [] Met: [] Not Met: [] Adjusted  3. Patient will demonstrate an increase in Strength to 5/5 to allow for proper functional mobility as indicated by patients Functional Deficits. [] Progressing: [x] Met: [] Not Met: [] Adjusted  4. Patient will return to functional activities including lifting overhead without increased symptoms or restriction. [x] Progressing: [] Met: [] Not Met: [] Adjusted    Overall Progression Towards Functional goals/ Treatment Progress Update:  [x] Patient is progressing as expected towards functional goals listed. [] Progression is slowed due to complexities/Impairments listed. [] Progression has been slowed due to co-morbidities.   [] Plan just implemented, too soon to assess goals progression <30days   [] Goals require adjustment due to lack of progress  [] Patient is not progressing as expected and requires additional follow up with physician  [] Other    Persisting Functional Limitations/Impairments:  []Sitting []Standing   []Transfers  []Sleeping   [x]Reaching [x]Lifting   [x]ADLs [x]Housework  [x]Driving [x]Job related tasks  [x]Sports/Recreation []Other:    ASSESSMENT: Pt tolerated today's treatment session well. Noted challenge with ball on wall circles. Cues to relax shoulders with CC strengthening exercises. Less tightness along R bicep but was tender along anterior RTC. PROM improved after manual STM. Continued with IFC as pt has found this to be helpful. Treatment/Activity Tolerance:  [x] Patient tolerated treatment session well  [] Patient limited by fatique  [] Patient limited by pain  [] Patient limited by other medical complications  [] Other:     Prognosis:  [x] Good [] Fair  [] Poor    Patient Requires Follow-up: [x] Yes  [] No    Return to Play:    [x]  N/A     []  Stage 1: Intro to Strength   []  Stage 2: Dynamic Strength and Intro to Plyometrics   []  Stage 3: Advanced Plyometrics and Intro to Throwing   []  Stage 4: Sport specific Training/Return to Sport     []  Ready to Return to Play, [employer]   []  Not Ready for Return to Sports   Comments:      Plan for next treatment session:     PLAN: See eval. PT 2x / week for 8 weeks. [x] Continue per plan of care [] Alter current plan (see comments)  [] Plan of care initiated [] Hold pending MD visit [] Discharge    Electronically signed by: Sayda Walters, [id number]      Note: If patient does not return for scheduled/ recommended follow up visits, this note will serve as a discharge from care along with most recent update on progress.

## 2022-11-01 ENCOUNTER — HOSPITAL ENCOUNTER (OUTPATIENT)
Dept: PHYSICAL THERAPY | Age: 47
Setting detail: THERAPIES SERIES
Discharge: HOME | End: 2022-11-01
Payer: COMMERCIAL

## 2022-11-01 PROCEDURE — 97530 THERAPEUTIC ACTIVITIES: CPT

## 2022-11-01 PROCEDURE — 97110 THERAPEUTIC EXERCISES: CPT

## 2022-11-01 PROCEDURE — 97140 MANUAL THERAPY 1/> REGIONS: CPT

## 2022-11-01 NOTE — FLOWSHEET NOTE
[facility]  Phone: [phone]  Fax: [phone]    Physical Therapy Treatment Note/ Progress Report:     Date:  2022    Patient Name:  Jeffrey Starr  \"WILL\" :  1975  MRN: [id number]  Restrictions/Precautions:    Medical/Treatment Diagnosis Information:  Adhesive capsulitis of right shoulder (M75.01)  Treatment Diagnosis: Decreased Right Shoulder AROM & Strength  Insurance/Certification information:  PT Insurance Information: [employer] ([employer])  Physician Information:  Liana Michael MD  Plan of care signed (Y/N): []  Yes  [x]  No     Date of Patient follow up with Physician:      Progress Report: [x]  Yes  []  No     Date Range for reporting period:  Beginnin22  Ending:     Progress report due (10 Rx/or 30 days whichever is less):      Recertification due (POC duration/ or 90 days whichever is less):  22    Visit # Insurance Allowable Auth required? Date Range   19 [place] []  Yes  []  No PCY     Latex Allergy:  [x]NO      []YES  Preferred Language for Healthcare:   [x]English       []other:    Functional Scale:        Date assessed:  FOTO physical FS primary measure score = 53; risk adjusted 53 22   FOTO = 59        10/20/22     Pain level: 4/10     SUBJECTIVE: Pt reports that pain has finally been a little better, maybe injection is kicking in. Had a busy weekend and has a busy work week ahead. OBJECTIVE:   10/19/22: Supine Right Shoulder AROM: Flexion: aBduction: IR: ER:   Strength: all 5/5   10/13:  R shldr AROM: flex 165, abd 155, IR 74, ER 59  R shldr MMT: 5 all!    10/10: R shldr ER 72  22: Supine R shoulder AROM Flexion: 160 degrees.    : R shldr PROM: 154 flex, 131 abd, 56 ER, 70 IR  R shldr MMT: 5 flex, 4+ abd, 5 ER/IR, 5 bic/tri    RESTRICTIONS/PRECAUTIONS:     Exercises/Interventions:   R shldr  Therapeutic Exercise (35560) Resistance / level Sets / Seconds  Reps Notes/Cues   UBE 2' for     Pulleys   10 flex, scap, IR behind back    Ball on table roll green  20 ea    Wall wash flex  Ball on wall circles   red  20 flex  10 cw, ccw    Cane AAROM: IR behind back, ext, abd   HEP          3-way curls  B ER  Bent row 5#  5#  5#  20 ea  15  20 R           Doorway pec stretch  20\"x3     SA punches  SL ER  Sleeper stretch 4#  4#  20  20 R           Therapeutic Activities (68901)       CC: Mid row  Shldr ext  [employer] add  IR/ER  High row  PNF   15#  5#  5#  5#  15#  7.5#    20  20  20  20 R  20  15 R    Shoulder flex / scap to 90      Wall push up   2x10                  Neuromuscular Re-ed (92783)       Yara over SB green  2x10                                Manual Intervention (82200)       R shldr PROM  10'     [address] mobs / gentle distraction      Manual STM to R delt  5'                              Pt. Education:  -pt educated on diagnosis, prognosis and expectations for rehab  -all pt questions were answered    Home Exercise Program:  9/9: shoulder flexion wall wash, tband mid row  8/8/22: The following exercises were performed and added to the pt's home program (educated on appropriate frequency, intensity and duration etc.):    Access Code: [email or web address]  URL: [email or web address]  Date: 08/12/2022  Prepared by: Sonia Nueñz     Exercises  Supine Shoulder Flexion with Dowel - 1 x daily - 7 x weekly - 3 sets - 10 reps  Standing Shoulder Abduction AAROM with Dowel - 1 x daily - 7 x weekly - 3 sets - 10 reps  Supine Shoulder External Rotation in 45 Degrees Abduction AAROM with Dowel - 1 x daily - 7 x weekly - 3 sets - 10 reps  Seated Upper Trapezius Stretch - 1 x daily - 7 x weekly - 3 sets - 10 reps    Therapeutic Exercise and NMR EXR  [x] (43897) Provided verbal/tactile cueing for activities related to strengthening, flexibility, endurance, ROM  for improvements in scapular, scapulothoracic and UE control with self care, reaching, carrying, lifting, house/yardwork, driving/computer work.    [] (63347) Provided verbal/tactile cueing for activities related to improving balance, coordination, kinesthetic sense, posture, motor skill, proprioception  to assist with  scapular, scapulothoracic and UE control with self care, reaching, carrying, lifting, house/yardwork, driving/computer work. Therapeutic Activities:    [] (54266 or 44897) Provided verbal/tactile cueing for activities related to improving balance, coordination, kinesthetic sense, posture, motor skill, proprioception and motor activation to allow for proper function of scapular, scapulothoracic and UE control with self care, carrying, lifting, driving/computer work.      Home Exercise Program:    [x] (08418) Reviewed/Progressed HEP activities related to strengthening, flexibility, endurance, ROM of scapular, scapulothoracic and UE control with self care, reaching, carrying, lifting, house/yardwork, driving/computer work  [] (43854) Reviewed/Progressed HEP activities related to improving balance, coordination, kinesthetic sense, posture, motor skill, proprioception of scapular, scapulothoracic and UE control with self care, reaching, carrying, lifting, house/yardwork, driving/computer work      Manual Treatments:  PROM / STM / Oscillations-Mobs:  G-I, II, III, IV (PA's, Inf., Post.)  [] (09562) Provided manual therapy to mobilize soft tissue/joints of cervical/CT, scapular GHJ and UE for the purpose of modulating pain, promoting relaxation,  increasing ROM, reducing/eliminating soft tissue swelling/inflammation/restriction, improving soft tissue extensibility and allowing for proper ROM for normal function with self care, reaching, carrying, lifting, house/yardwork, driving/computer work    Modalities: none today    Charges:  Timed Code Treatment Minutes: 45   Total Treatment Minutes: 45       [] EVAL - LOW (92133)   [] EVAL - MOD (17962)  [] EVAL - HIGH (21923)  [] RE-EVAL (44049)  [x] TE ([address]) x 1     [] Ionto (40307)  [] NMR (15920) x      [] Vaso (03168)  [x] Manual (41287) x 1     [] Ultrasound  [x] TA (30774) x  1    [] Mech Traction (02886)  [] Gait Training x     [] ES (un) (18692):   [] Aquatic therapy x   [] Other:   [] Group:     If BWC Please Indicate Time In/Out  CPT Code Time in Time out                                         GOALS:  Therapist goals for Patient:  Short Term Goals: To be achieved in: 2 weeks  1. Independent in HEP and progression per patient tolerance, in order to prevent re-injury. [] Progressing: [x] Met: [] Not Met: [] Adjusted  2. Patient will have a decrease in pain to facilitate improvement in movement, function, and ADLs as indicated by Functional Deficits. [] Progressing: [x] Met: [] Not Met: [] Adjusted     Long Term Goals: To be achieved in: 8 weeks  1. Disability index score of 10% or less for the UEFI or Quick DASH to assist with reaching prior level of function. [x] Progressing: [] Met: [] Not Met: [] Adjusted  2. Patient will demonstrate increased AROM to 180 to allow for proper joint functioning as indicated by patients Functional Deficits. [x] Progressing: [] Met: [] Not Met: [] Adjusted  3. Patient will demonstrate an increase in Strength to 5/5 to allow for proper functional mobility as indicated by patients Functional Deficits. [] Progressing: [x] Met: [] Not Met: [] Adjusted  4. Patient will return to functional activities including lifting overhead without increased symptoms or restriction. [x] Progressing: [] Met: [] Not Met: [] Adjusted    Overall Progression Towards Functional goals/ Treatment Progress Update:  [x] Patient is progressing as expected towards functional goals listed. [] Progression is slowed due to complexities/Impairments listed. [] Progression has been slowed due to co-morbidities.   [] Plan just implemented, too soon to assess goals progression <30days   [] Goals require adjustment due to lack of progress  [] Patient is not progressing as expected and requires additional follow up with physician  [] Other    Persisting Functional Limitations/Impairments:  []Sitting []Standing   []Transfers  []Sleeping   [x]Reaching [x]Lifting   [x]ADLs [x]Housework  [x]Driving [x]Job related tasks  [x]Sports/Recreation []Other:    ASSESSMENT: Began session with manual and pt noted that he had increased pain with R shldr PROM, at end ranges of flexion and abduction. Good form with strengthening exercises. Did well with SL ER today. Challenged by [place]'s over SB but was able to get better shoulder AROM. Treatment/Activity Tolerance:  [x] Patient tolerated treatment session well  [] Patient limited by fatique  [] Patient limited by pain  [] Patient limited by other medical complications  [] Other:     Prognosis:  [x] Good [] Fair  [] Poor    Patient Requires Follow-up: [x] Yes  [] No    Return to Play:    [x]  N/A     []  Stage 1: Intro to Strength   []  Stage 2: Dynamic Strength and Intro to Plyometrics   []  Stage 3: Advanced Plyometrics and Intro to Throwing   []  Stage 4: Sport specific Training/Return to Sport     []  Ready to Return to Play, [employer]   []  Not Ready for Return to Sports   Comments:      Plan for next treatment session:     PLAN: See eval. PT 2x / week for 8 weeks. [x] Continue per plan of care [] Alter current plan (see comments)  [] Plan of care initiated [] Hold pending MD visit [] Discharge    Electronically signed by: Frank Prince [id number]      Note: If patient does not return for scheduled/ recommended follow up visits, this note will serve as a discharge from care along with most recent update on progress.

## 2022-11-04 ENCOUNTER — HOSPITAL ENCOUNTER (OUTPATIENT)
Dept: PHYSICAL THERAPY | Age: 47
Setting detail: THERAPIES SERIES
Discharge: HOME | End: 2022-11-04
Payer: COMMERCIAL

## 2022-11-04 PROCEDURE — 97110 THERAPEUTIC EXERCISES: CPT

## 2022-11-04 PROCEDURE — 97140 MANUAL THERAPY 1/> REGIONS: CPT

## 2022-11-04 PROCEDURE — 97530 THERAPEUTIC ACTIVITIES: CPT

## 2022-11-04 NOTE — FLOWSHEET NOTE
[facility]  Phone: [phone]  Fax: [phone]    Physical Therapy Treatment Note/ Progress Report:     Date:  2022    Patient Name:  Erik Walters  \"WILL\" :  1975  MRN: [id number]  Restrictions/Precautions:    Medical/Treatment Diagnosis Information:  Adhesive capsulitis of right shoulder (M75.01)  Treatment Diagnosis: Decreased Right Shoulder AROM & Strength  Insurance/Certification information:  PT Insurance Information: [employer] (Med [employer]ec)  Physician Information:  Noel Lewis MD  Plan of care signed (Y/N): []  Yes  [x]  No     Date of Patient follow up with Physician:      Progress Report: [x]  Yes  []  No     Date Range for reporting period:  Beginnin22  Ending:     Progress report due (10 Rx/or 30 days whichever is less):      Recertification due (POC duration/ or 90 days whichever is less):  22    Visit # Insurance Allowable Auth required? Date Range   20 [place] []  Yes  []  No PCY     Latex Allergy:  [x]NO      []YES  Preferred Language for Healthcare:   [x]English       []other:    Functional Scale:        Date assessed:  FOTO physical FS primary measure score = 53; risk adjusted 53 22   FOTO = 59        10/20/22     Pain level: 4/10     SUBJECTIVE: Pt reports about the same pain, still feeling a little relief from injection. OBJECTIVE:   10/19/22: Supine Right Shoulder AROM: Flexion: aBduction: IR: ER:   Strength: all 5/5   10/13:  R shldr AROM: flex 165, abd 155, IR 74, ER 59  R shldr MMT: 5 all!    10/10: R shldr ER 72  22: Supine R shoulder AROM Flexion: 160 degrees.    : R shldr PROM: 154 flex, 131 abd, 56 ER, 70 IR  R shldr MMT: 5 flex, 4+ abd, 5 ER/IR, 5 bic/tri    RESTRICTIONS/PRECAUTIONS:     Exercises/Interventions:   R shldr  Therapeutic Exercise (15431) Resistance / level Sets / Seconds  Reps Notes/Cues   UBE  2' for     Pulleys   10 flex, scap, IR behind back Goldie Moritz on table roll green  20 ea    Wall wash flex  Ball on wall circles   red  20 flex  10 cw, ccw    Cane AAROM: IR behind back, ext, abd   HEP          3-way curls  B ER  Bent row 5#  5#  5#  20 ea  15  20 R           Doorway pec stretch  20\"x3     SA punches  SL ER  Sleeper stretch 4#  4#  20  20 R           Therapeutic Activities (40225)       CC: Mid row  Shldr ext  [employer] add  IR/ER  High row  PNF   15#  5#  5#  5#  15#  7.5#    20  20  20  20 R  20  15 R    Shoulder flex / scap to 90      Wall push up   2x10                  Neuromuscular Re-ed (11645)       Yara over SB green  2x10                                Manual Intervention (46613)       R shldr PROM  10'     [facility] mobs / gentle distraction      Manual STM to R delt                               Pt. Education:  -pt educated on diagnosis, prognosis and expectations for rehab  -all pt questions were answered    Home Exercise Program:  9/9: shoulder flexion wall wash, tband mid row  8/8/22: The following exercises were performed and added to the pt's home program (educated on appropriate frequency, intensity and duration etc.):    Access Code: [email or web address]  URL: [email or web address]  Date: 08/12/2022  Prepared by: Natividad Mar     Exercises  Supine Shoulder Flexion with Dowel - 1 x daily - 7 x weekly - 3 sets - 10 reps  Standing Shoulder Abduction AAROM with Dowel - 1 x daily - 7 x weekly - 3 sets - 10 reps  Supine Shoulder External Rotation in 45 Degrees Abduction AAROM with Dowel - 1 x daily - 7 x weekly - 3 sets - 10 reps  Seated Upper Trapezius Stretch - 1 x daily - 7 x weekly - 3 sets - 10 reps    Therapeutic Exercise and NMR EXR  [x] (00821) Provided verbal/tactile cueing for activities related to strengthening, flexibility, endurance, ROM  for improvements in scapular, scapulothoracic and UE control with self care, reaching, carrying, lifting, house/yardwork, driving/computer work.    [] (96430) Provided verbal/tactile cueing for activities related to improving balance, coordination, kinesthetic sense, posture, motor skill, proprioception  to assist with  scapular, scapulothoracic and UE control with self care, reaching, carrying, lifting, house/yardwork, driving/computer work. Therapeutic Activities:    [] (46988 or 23902) Provided verbal/tactile cueing for activities related to improving balance, coordination, kinesthetic sense, posture, motor skill, proprioception and motor activation to allow for proper function of scapular, scapulothoracic and UE control with self care, carrying, lifting, driving/computer work.      Home Exercise Program:    [x] (21246) Reviewed/Progressed HEP activities related to strengthening, flexibility, endurance, ROM of scapular, scapulothoracic and UE control with self care, reaching, carrying, lifting, house/yardwork, driving/computer work  [] (72540) Reviewed/Progressed HEP activities related to improving balance, coordination, kinesthetic sense, posture, motor skill, proprioception of scapular, scapulothoracic and UE control with self care, reaching, carrying, lifting, house/yardwork, driving/computer work      Manual Treatments:  PROM / STM / Oscillations-Mobs:  G-I, II, III, IV (PA's, Inf., Post.)  [] (57529) Provided manual therapy to mobilize soft tissue/joints of cervical/CT, scapular GHJ and UE for the purpose of modulating pain, promoting relaxation,  increasing ROM, reducing/eliminating soft tissue swelling/inflammation/restriction, improving soft tissue extensibility and allowing for proper ROM for normal function with self care, reaching, carrying, lifting, house/yardwork, driving/computer work    Modalities: 11/4: IFC to R shldr x 10' - pt seated    Charges:  Timed Code Treatment Minutes: 50   Total Treatment Minutes: 50       [] EVAL - LOW (21161)   [] EVAL - MOD (36474)  [] EVAL - HIGH (20319)  [] RE-EVAL (29263)  [x] TE (78440) x 1     [] Ionto (16601)  [] NMR (81369) x      [] Vaso (22284)  [x] Manual (41536) x 1     [] Ultrasound  [x] TA (16073) x  1    [] Mech Traction (21948)  [] Gait Training x     [x] ES (un) (09988):   [] Aquatic therapy x   [] Other:   [] Group:     If BWC Please Indicate Time In/Out  CPT Code Time in Time out                                         GOALS:  Therapist goals for Patient:  Short Term Goals: To be achieved in: 2 weeks  1. Independent in HEP and progression per patient tolerance, in order to prevent re-injury. [] Progressing: [x] Met: [] Not Met: [] Adjusted  2. Patient will have a decrease in pain to facilitate improvement in movement, function, and ADLs as indicated by Functional Deficits. [] Progressing: [x] Met: [] Not Met: [] Adjusted     Long Term Goals: To be achieved in: 8 weeks  1. Disability index score of 10% or less for the UEFI or Quick DASH to assist with reaching prior level of function. [x] Progressing: [] Met: [] Not Met: [] Adjusted  2. Patient will demonstrate increased AROM to 180 to allow for proper joint functioning as indicated by patients Functional Deficits. [x] Progressing: [] Met: [] Not Met: [] Adjusted  3. Patient will demonstrate an increase in Strength to 5/5 to allow for proper functional mobility as indicated by patients Functional Deficits. [] Progressing: [x] Met: [] Not Met: [] Adjusted  4. Patient will return to functional activities including lifting overhead without increased symptoms or restriction. [x] Progressing: [] Met: [] Not Met: [] Adjusted    Overall Progression Towards Functional goals/ Treatment Progress Update:  [x] Patient is progressing as expected towards functional goals listed. [] Progression is slowed due to complexities/Impairments listed. [] Progression has been slowed due to co-morbidities.   [] Plan just implemented, too soon to assess goals progression <30days   [] Goals require adjustment due to lack of progress  [] Patient is not progressing as expected and requires additional follow up with physician  [] Other    Persisting Functional Limitations/Impairments:  []Sitting []Standing   []Transfers  []Sleeping   [x]Reaching [x]Lifting   [x]ADLs [x]Housework  [x]Driving [x]Job related tasks  [x]Sports/Recreation []Other:    ASSESSMENT: Pt with fair exercise tolerance, no increase in pain or soreness. Did well with strengthening exercises. Remains tight into shoulder flexion. Treatment/Activity Tolerance:  [x] Patient tolerated treatment session well  [] Patient limited by fatique  [] Patient limited by pain  [] Patient limited by other medical complications  [] Other:     Prognosis:  [x] Good [] Fair  [] Poor    Patient Requires Follow-up: [x] Yes  [] No    Return to Play:    [x]  N/A     []  Stage 1: Intro to Strength   []  Stage 2: Dynamic Strength and Intro to Plyometrics   []  Stage 3: Advanced Plyometrics and Intro to Throwing   []  Stage 4: Sport specific Training/Return to Sport     []  Ready to Return to Play, [employer]   []  Not Ready for Return to Sports   Comments:      Plan for next treatment session:     PLAN: See eval. PT 2x / week for 8 weeks. [x] Continue per plan of care [] Alter current plan (see comments)  [] Plan of care initiated [] Hold pending MD visit [] Discharge    Electronically signed by: MISSY [id number]      Note: If patient does not return for scheduled/ recommended follow up visits, this note will serve as a discharge from care along with most recent update on progress.

## 2022-11-08 ENCOUNTER — HOSPITAL ENCOUNTER (OUTPATIENT)
Dept: PHYSICAL THERAPY | Age: 47
Setting detail: THERAPIES SERIES
Discharge: HOME | End: 2022-11-08
Payer: COMMERCIAL

## 2022-11-08 PROCEDURE — 97110 THERAPEUTIC EXERCISES: CPT

## 2022-11-08 PROCEDURE — 97140 MANUAL THERAPY 1/> REGIONS: CPT

## 2022-11-08 PROCEDURE — G0283 ELEC STIM OTHER THAN WOUND: HCPCS

## 2022-11-08 PROCEDURE — 97530 THERAPEUTIC ACTIVITIES: CPT

## 2022-11-08 NOTE — FLOWSHEET NOTE
[facility]  Phone: [phone]  Fax: [phone]    Physical Therapy Treatment Note/ Progress Report:     Date:  2022    Patient Name:  Karis Gallardo  \"WILL\" :  1975  MRN: [id number]  Restrictions/Precautions:    Medical/Treatment Diagnosis Information:  Adhesive capsulitis of right shoulder (M75.01)  Treatment Diagnosis: Decreased Right Shoulder AROM & Strength  Insurance/Certification information:  PT Insurance Information: [employer] (Med [employer]ec)  Physician Information:  Abel Mcfadden MD  Plan of care signed (Y/N): []  Yes  [x]  No     Date of Patient follow up with Physician:      Progress Report: [x]  Yes  []  No     Date Range for reporting period:  Beginnin22  Ending:     Progress report due (10 Rx/or 30 days whichever is less):  33    Recertification due (POC duration/ or 90 days whichever is less):  22    Visit # Insurance Allowable Auth required? Date Range   21 [place] []  Yes  []  No PCY     Latex Allergy:  [x]NO      []YES  Preferred Language for Healthcare:   [x]English       []other:    Functional Scale:        Date assessed:  FOTO physical FS primary measure score = 53; risk adjusted 53 22   FOTO = 59        10/20/22     Pain level: 4/10     SUBJECTIVE: Pt reports that he is doing alright, pain hasn't really changed that much. Is frustrated that pain has never gone away but thankful for improved ROM. Stretches daily but takes a few times to get arm to loosen up; does get relief for the rest of the day with estim but doesn't understand why pain doesn't completely go away. Most painful activity is reaching behind his back. OBJECTIVE:   10/19/22: Supine Right Shoulder AROM: Flexion: aBduction: IR: ER:   Strength: all 5/5   10/13:  R shldr AROM: flex 165, abd 155, IR 74, ER 59  R shldr MMT: 5 all!    10/10: R shldr ER 72  22: Supine R shoulder AROM Flexion: 160 degrees.    : R shldr PROM: 154 flex, 131 abd, 56 ER, 70 IR  R shldr MMT: 5 flex, 4+ abd, 5 ER/IR, 5 bic/tri    RESTRICTIONS/PRECAUTIONS:     Exercises/Interventions:   R shldr  Therapeutic Exercise (02296) Resistance / level Sets / Seconds  Reps Notes/Cues   UBE  2' for     Pulleys   10 flex, scap, IR behind back    Ball on table roll green     Wall wash flex  Ball on wall circles   red  20 flex  10 cw, ccw    Cane AAROM: IR behind back, ext, abd   HEP          3-way curls  B ER  Bent row 5#  5#  5#  20 ea  15  20 R           Doorway pec stretch  20\"x3     SA punches  SL ER  Sleeper stretch 4#  4#  20  20 R  10\"x5           Therapeutic Activities (89644)       CC: Mid row  Shldr ext  [employer] add  IR/ER  High row  PNF   15#  5#  5#  5#  15#  7.5#    20  20  20  20 R  20  15 R    Shoulder flex / scap to 90      Wall push up   2x10                  Neuromuscular Re-ed (54311)       Yara over SB green  2x10                                Manual Intervention (46460)       R shldr PROM  10'     [address] mobs / gentle distraction  3'     Manual STM to R delt  5'                              Pt. Education:  -pt educated on diagnosis, prognosis and expectations for rehab  -all pt questions were answered    Home Exercise Program:  11/8/22: sleeper stretch  9/9: shoulder flexion wall wash, tband mid row  8/8/22: The following exercises were performed and added to the pt's home program (educated on appropriate frequency, intensity and duration etc.):    Access Code: [email or web address]  URL: [email or web address]  Date: 08/12/2022  Prepared by: Fer Antunez     Exercises  Supine Shoulder Flexion with Dowel - 1 x daily - 7 x weekly - 3 sets - 10 reps  Standing Shoulder Abduction AAROM with Dowel - 1 x daily - 7 x weekly - 3 sets - 10 reps  Supine Shoulder External Rotation in 45 Degrees Abduction AAROM with Dowel - 1 x daily - 7 x weekly - 3 sets - 10 reps  Seated Upper Trapezius Stretch - 1 x daily - 7 x weekly - 3 sets - 10 reps    Therapeutic Exercise and NMR EXR  [x] (94421) Provided verbal/tactile cueing for activities related to strengthening, flexibility, endurance, ROM  for improvements in scapular, scapulothoracic and UE control with self care, reaching, carrying, lifting, house/yardwork, driving/computer work.    [] (98767) Provided verbal/tactile cueing for activities related to improving balance, coordination, kinesthetic sense, posture, motor skill, proprioception  to assist with  scapular, scapulothoracic and UE control with self care, reaching, carrying, lifting, house/yardwork, driving/computer work. Therapeutic Activities:    [] (01247 or 31784) Provided verbal/tactile cueing for activities related to improving balance, coordination, kinesthetic sense, posture, motor skill, proprioception and motor activation to allow for proper function of scapular, scapulothoracic and UE control with self care, carrying, lifting, driving/computer work.      Home Exercise Program:    [x] (75697) Reviewed/Progressed HEP activities related to strengthening, flexibility, endurance, ROM of scapular, scapulothoracic and UE control with self care, reaching, carrying, lifting, house/yardwork, driving/computer work  [] (27216) Reviewed/Progressed HEP activities related to improving balance, coordination, kinesthetic sense, posture, motor skill, proprioception of scapular, scapulothoracic and UE control with self care, reaching, carrying, lifting, house/yardwork, driving/computer work      Manual Treatments:  PROM / STM / Oscillations-Mobs:  G-I, II, III, IV (PA's, Inf., Post.)  [] (18765) Provided manual therapy to mobilize soft tissue/joints of cervical/CT, scapular GHJ and UE for the purpose of modulating pain, promoting relaxation,  increasing ROM, reducing/eliminating soft tissue swelling/inflammation/restriction, improving soft tissue extensibility and allowing for proper ROM for normal function with self care, reaching, carrying, lifting, house/yardwork, driving/computer work    Modalities: 11/8: IFC to R shldr x 10' - pt seated    Charges:  Timed Code Treatment Minutes: 55   Total Treatment Minutes: 55       [] EVAL - LOW (45181)   [] EVAL - MOD (05005)  [] EVAL - HIGH (36970)  [] RE-EVAL (50822)  [x] TE (97649) x 1     [] Ionto (95394)  [] NMR (85397) x      [] Vaso (32119)  [x] Manual (89017) x 1     [] Ultrasound  [x] TA (91679) x  1    [] Mech Traction (11504)  [] Gait Training x     [x] ES (un) (62968):   [] Aquatic therapy x   [] Other:   [] Group:     If BWC Please Indicate Time In/Out  CPT Code Time in Time out                                         GOALS:  Therapist goals for Patient:  Short Term Goals: To be achieved in: 2 weeks  1. Independent in HEP and progression per patient tolerance, in order to prevent re-injury. [] Progressing: [x] Met: [] Not Met: [] Adjusted  2. Patient will have a decrease in pain to facilitate improvement in movement, function, and ADLs as indicated by Functional Deficits. [] Progressing: [x] Met: [] Not Met: [] Adjusted     Long Term Goals: To be achieved in: 8 weeks  1. Disability index score of 10% or less for the UEFI or Quick DASH to assist with reaching prior level of function. [x] Progressing: [] Met: [] Not Met: [] Adjusted  2. Patient will demonstrate increased AROM to 180 to allow for proper joint functioning as indicated by patients Functional Deficits. [x] Progressing: [] Met: [] Not Met: [] Adjusted  3. Patient will demonstrate an increase in Strength to 5/5 to allow for proper functional mobility as indicated by patients Functional Deficits. [] Progressing: [x] Met: [] Not Met: [] Adjusted  4. Patient will return to functional activities including lifting overhead without increased symptoms or restriction.   [x] Progressing: [] Met: [] Not Met: [] Adjusted    Overall Progression Towards Functional goals/ Treatment Progress Update:  [x] Patient is progressing as expected towards functional goals listed. [] Progression is slowed due to complexities/Impairments listed. [] Progression has been slowed due to co-morbidities. [] Plan just implemented, too soon to assess goals progression <30days   [] Goals require adjustment due to lack of progress  [] Patient is not progressing as expected and requires additional follow up with physician  [] Other    Persisting Functional Limitations/Impairments:  []Sitting []Standing   []Transfers  []Sleeping   [x]Reaching [x]Lifting   [x]ADLs [x]Housework  [x]Driving [x]Job related tasks  [x]Sports/Recreation []Other:    ASSESSMENT: Reviewed with pt exercises that he performs at home and added sleeper stretch. Pt with fair exercise tolerance. Continues to be tight into shoulder flexion and abduction PROM. Discussed with pt about contacting MD to see if he can get in earlier or to figure out what next step is as he has gotten minimal relief from first injection and has daily pain. Treatment/Activity Tolerance:  [x] Patient tolerated treatment session well  [] Patient limited by fatique  [] Patient limited by pain  [] Patient limited by other medical complications  [] Other:     Prognosis:  [x] Good [] Fair  [] Poor    Patient Requires Follow-up: [x] Yes  [] No    Return to Play:    [x]  N/A     []  Stage 1: Intro to Strength   []  Stage 2: Dynamic Strength and Intro to Plyometrics   []  Stage 3: Advanced Plyometrics and Intro to Throwing   []  Stage 4: Sport specific Training/Return to Sport     []  Ready to Return to Play, [employer]   []  Not Ready for Return to Sports   Comments:      Plan for next treatment session:     PLAN: See eval. PT 2x / week for 8 weeks.   [x] Continue per plan of care [] Alter current plan (see comments)  [] Plan of care initiated [] Hold pending MD visit [] Discharge    Electronically signed by: Addy Mistry, [id number]      Note: If patient does not return for scheduled/ recommended follow up visits, this note will serve as a discharge from care along with most recent update on progress.

## 2022-11-11 ENCOUNTER — HOSPITAL ENCOUNTER (OUTPATIENT)
Dept: PHYSICAL THERAPY | Age: 47
Setting detail: THERAPIES SERIES
Discharge: HOME | End: 2022-11-11
Payer: COMMERCIAL

## 2022-11-11 PROCEDURE — 97530 THERAPEUTIC ACTIVITIES: CPT

## 2022-11-11 PROCEDURE — 97110 THERAPEUTIC EXERCISES: CPT

## 2022-11-11 PROCEDURE — 97140 MANUAL THERAPY 1/> REGIONS: CPT

## 2022-11-11 PROCEDURE — G0283 ELEC STIM OTHER THAN WOUND: HCPCS

## 2022-11-11 NOTE — FLOWSHEET NOTE
Rigoberto Cast  Phone: [phone]  Fax: [phone]    Physical Therapy Treatment Note/ Progress Report:     Date:  2022    Patient Name:  Katerina Martinez  \"WILL\" :  1975  MRN: [id number]  Restrictions/Precautions:    Medical/Treatment Diagnosis Information:  Adhesive capsulitis of right shoulder (M75.01)  Treatment Diagnosis: Decreased Right Shoulder AROM & Strength  Insurance/Certification information:  PT Insurance Information: [employer] ([employer])  Physician Information:  Jimi Ospina MD  Plan of care signed (Y/N): []  Yes  [x]  No     Date of Patient follow up with Physician:      Progress Report: [x]  Yes  []  No     Date Range for reporting period:  Beginnin22  Ending:     Progress report due (10 Rx/or 30 days whichever is less):      Recertification due (POC duration/ or 90 days whichever is less):  22    Visit # Insurance Allowable Auth required? Date Range   22 [place] []  Yes  []  No PCY     Latex Allergy:  [x]NO      []YES  Preferred Language for Healthcare:   [x]English       []other:    Functional Scale:        Date assessed:  FOTO physical FS primary measure score = 53; risk adjusted 53 22   FOTO = 59        10/20/22     Pain level: 1-7/10     SUBJECTIVE: Pt states he shoulder does not hurt much at rest but if he has to reach out quickly it hurts. OBJECTIVE:   10/19/22: Supine Right Shoulder AROM: Flexion: aBduction: IR: ER:   Strength: all 5/5   10/13:  R shldr AROM: flex 165, abd 155, IR 74, ER 59  R shldr MMT: 5 all!    10/10: R shldr ER 72  22: Supine R shoulder AROM Flexion: 160 degrees.    : R shldr PROM: 154 flex, 131 abd, 56 ER, 70 IR  R shldr MMT: 5 flex, 4+ abd, 5 ER/IR, 5 bic/tri    RESTRICTIONS/PRECAUTIONS:     Exercises/Interventions:   R shldr  Therapeutic Exercise (54201) Resistance / level Sets / Seconds  Reps Notes/Cues   UBE  2' for     Pulleys   10 flex, scap, IR behind back    Ball on table roll green     Wall wash flex  Ball on wall circles   red  20 flex  10 cw, ccw    Cane AAROM: IR behind back, ext, abd   HEP          3-way curls  B ER  Bent row 5#  5#  5#  20 ea  15  20 R           Doorway pec stretch  20\"x3     SA punches  SL ER  Sleeper stretch 4#  4#  20  20 R  10\"x5           Therapeutic Activities (67861)       CC: Mid row  Shldr ext  [employer] add  IR/ER  High row  PNF   15#  5#  5#  5#  15#  7.5#    20  20  20  20 R  20  15 R    Shoulder flex / scap to 90      Wall push up   2x10                  Neuromuscular Re-ed (04488)       [place] over SB green  2x10                                Manual Intervention (24288)       R shldr PROM  10'     [address] mobs / gentle distraction  3'     Manual STM to R delt  5'                              Pt. Education:  -pt educated on diagnosis, prognosis and expectations for rehab  -all pt questions were answered    Home Exercise Program:  11/8/22: sleeper stretch  9/9: shoulder flexion wall wash, tband mid row  8/8/22: The following exercises were performed and added to the pt's home program (educated on appropriate frequency, intensity and duration etc.):    Access Code: [email or web address]  URL: [email or web address]  Date: 08/12/2022  Prepared by: Ambar Sawyer     Exercises  Supine Shoulder Flexion with Dowel - 1 x daily - 7 x weekly - 3 sets - 10 reps  Standing Shoulder Abduction AAROM with Dowel - 1 x daily - 7 x weekly - 3 sets - 10 reps  Supine Shoulder External Rotation in 45 Degrees Abduction AAROM with Dowel - 1 x daily - 7 x weekly - 3 sets - 10 reps  Seated Upper Trapezius Stretch - 1 x daily - 7 x weekly - 3 sets - 10 reps    Therapeutic Exercise and NMR EXR  [x] (89496) Provided verbal/tactile cueing for activities related to strengthening, flexibility, endurance, ROM  for improvements in scapular, scapulothoracic and UE control with self care, reaching, carrying, lifting, house/yardwork, driving/computer work.    [] (12771) Provided verbal/tactile cueing for activities related to improving balance, coordination, kinesthetic sense, posture, motor skill, proprioception  to assist with  scapular, scapulothoracic and UE control with self care, reaching, carrying, lifting, house/yardwork, driving/computer work. Therapeutic Activities:    [] (13659 or 43721) Provided verbal/tactile cueing for activities related to improving balance, coordination, kinesthetic sense, posture, motor skill, proprioception and motor activation to allow for proper function of scapular, scapulothoracic and UE control with self care, carrying, lifting, driving/computer work.      Home Exercise Program:    [x] (59698) Reviewed/Progressed HEP activities related to strengthening, flexibility, endurance, ROM of scapular, scapulothoracic and UE control with self care, reaching, carrying, lifting, house/yardwork, driving/computer work  [] (07756) Reviewed/Progressed HEP activities related to improving balance, coordination, kinesthetic sense, posture, motor skill, proprioception of scapular, scapulothoracic and UE control with self care, reaching, carrying, lifting, house/yardwork, driving/computer work      Manual Treatments:  PROM / STM / Oscillations-Mobs:  G-I, II, III, IV (PA's, Inf., Post.)  [] (62609) Provided manual therapy to mobilize soft tissue/joints of cervical/CT, scapular GHJ and UE for the purpose of modulating pain, promoting relaxation,  increasing ROM, reducing/eliminating soft tissue swelling/inflammation/restriction, improving soft tissue extensibility and allowing for proper ROM for normal function with self care, reaching, carrying, lifting, house/yardwork, driving/computer work    Modalities: 11/11: IFC to R shldr x 10' - pt seated    Charges:  Timed Code Treatment Minutes: 55   Total Treatment Minutes: 55       [] EVAL - LOW (94465)   [] EVAL - MOD (85373)  [] EVAL - HIGH (43201)  [] RE-EVAL (28921)  [x] TE ([id number]) x 1     [] Ionto (86940)  [] NMR (07883) x      [] Vaso (35789)  [x] Manual (71352) x 1     [] Ultrasound  [x] TA (13575) x  1    [] Mech Traction (48969)  [] Gait Training x     [x] ES (un) (36222):   [] Aquatic therapy x   [] Other:   [] Group:     If BWC Please Indicate Time In/Out  CPT Code Time in Time out                                         GOALS:  Therapist goals for Patient:  Short Term Goals: To be achieved in: 2 weeks  1. Independent in HEP and progression per patient tolerance, in order to prevent re-injury. [] Progressing: [x] Met: [] Not Met: [] Adjusted  2. Patient will have a decrease in pain to facilitate improvement in movement, function, and ADLs as indicated by Functional Deficits. [] Progressing: [x] Met: [] Not Met: [] Adjusted     Long Term Goals: To be achieved in: 8 weeks  1. Disability index score of 10% or less for the UEFI or Quick DASH to assist with reaching prior level of function. [x] Progressing: [] Met: [] Not Met: [] Adjusted  2. Patient will demonstrate increased AROM to 180 to allow for proper joint functioning as indicated by patients Functional Deficits. [x] Progressing: [] Met: [] Not Met: [] Adjusted  3. Patient will demonstrate an increase in Strength to 5/5 to allow for proper functional mobility as indicated by patients Functional Deficits. [] Progressing: [x] Met: [] Not Met: [] Adjusted  4. Patient will return to functional activities including lifting overhead without increased symptoms or restriction. [x] Progressing: [] Met: [] Not Met: [] Adjusted    Overall Progression Towards Functional goals/ Treatment Progress Update:  [x] Patient is progressing as expected towards functional goals listed. [] Progression is slowed due to complexities/Impairments listed. [] Progression has been slowed due to co-morbidities.   [] Plan just implemented, too soon to assess goals progression <30days   [] Goals require adjustment due to lack of progress  [] Patient is not progressing as expected and requires additional follow up with physician  [] Other    Persisting Functional Limitations/Impairments:  []Sitting []Standing   []Transfers  []Sleeping   [x]Reaching [x]Lifting   [x]ADLs [x]Housework  [x]Driving [x]Job related tasks  [x]Sports/Recreation []Other:    ASSESSMENT: Patient tolerated today's treatment session well. Treatment/Activity Tolerance:  [x] Patient tolerated treatment session well  [] Patient limited by fatique  [] Patient limited by pain  [] Patient limited by other medical complications  [] Other:     Prognosis:  [x] Good [] Fair  [] Poor    Patient Requires Follow-up: [x] Yes  [] No    Return to Play:    [x]  N/A     []  Stage 1: Intro to Strength   []  Stage 2: Dynamic Strength and Intro to Plyometrics   []  Stage 3: Advanced Plyometrics and Intro to Throwing   []  Stage 4: Sport specific Training/Return to Sport     []  Ready to Return to Play, [employer]   []  Not Ready for Return to Sports   Comments:      Plan for next treatment session:     PLAN: See eval. PT 2x / week for 8 weeks. [x] Continue per plan of care [] Alter current plan (see comments)  [] Plan of care initiated [] Hold pending MD visit [] Discharge    Electronically signed by: Berna Rosado PT      Note: If patient does not return for scheduled/ recommended follow up visits, this note will serve as a discharge from care along with most recent update on progress.

## 2022-11-14 ENCOUNTER — HOSPITAL ENCOUNTER (OUTPATIENT)
Dept: PHYSICAL THERAPY | Age: 47
Setting detail: THERAPIES SERIES
Discharge: HOME | End: 2022-11-14
Payer: COMMERCIAL

## 2022-11-14 PROCEDURE — 97530 THERAPEUTIC ACTIVITIES: CPT | Performed by: PHYSICAL THERAPIST

## 2022-11-14 PROCEDURE — 97110 THERAPEUTIC EXERCISES: CPT | Performed by: PHYSICAL THERAPIST

## 2022-11-14 PROCEDURE — 97140 MANUAL THERAPY 1/> REGIONS: CPT | Performed by: PHYSICAL THERAPIST

## 2022-11-14 NOTE — FLOWSHEET NOTE
[facility] Cast  Phone: [phone]  Fax: [phone]    Physical Therapy Treatment Note/ Progress Report:     Date:  2022    Patient Name:  Sienna Saleh  \"WILL\" :  1975  MRN: [id number]  Restrictions/Precautions:    Medical/Treatment Diagnosis Information:  Adhesive capsulitis of right shoulder (M75.01)  Treatment Diagnosis: Decreased Right Shoulder AROM & Strength  Insurance/Certification information:  PT Insurance Information: [employer] ([employer])  Physician Information:  Loco العلي MD  Plan of care signed (Y/N): []  Yes  [x]  No     Date of Patient follow up with Physician:      Progress Report: [x]  Yes  []  No     Date Range for reporting period:  Beginnin22  Ending:     Progress report due (10 Rx/or 30 days whichever is less):      Recertification due (POC duration/ or 90 days whichever is less):  22    Visit # Insurance Allowable Auth required? Date Range   23 [place] []  Yes  []  No PCY     Latex Allergy:  [x]NO      []YES  Preferred Language for Healthcare:   [x]English       []other:    Functional Scale:        Date assessed:  FOTO physical FS primary measure score = 53; risk adjusted 53 22   FOTO = 59        10/20/22     Pain level: 4/10     SUBJECTIVE: Pt states he shoulder is improved, however still has tightness and soreness. OBJECTIVE:   10/19/22: Supine Right Shoulder AROM: Flexion: aBduction: IR: ER:   Strength: all 5/5   10/13:  R shldr AROM: flex 165, abd 155, IR 74, ER 59  R shldr MMT: 5 all!    10/10: R shldr ER 72  22: Supine R shoulder AROM Flexion: 160 degrees.    : R shldr PROM: 154 flex, 131 abd, 56 ER, 70 IR  R shldr MMT: 5 flex, 4+ abd, 5 ER/IR, 5 bic/tri    RESTRICTIONS/PRECAUTIONS:     Exercises/Interventions:   R shldr  Therapeutic Exercise (91517) Resistance / level Sets / Seconds  Reps Notes/Cues   UBE  2' for     Pulleys   10 flex, scap, IR behind back 11/14: recommended pt get home pulley   Ball on table roll green     Wall wash flex  Ball on wall circles   red  20 flex  10 cw, ccw    Cane AAROM: IR behind back, ext, abd   HEP          3-way curls  B ER  Bent row 5#  5#  5#  20 ea  15  20 R           Doorway pec stretch  20\"x3     SA punches  SL ER  Sleeper stretch 4#  4#  20  20 R  10\"x5           Therapeutic Activities (43580)       CC: Mid row  Shldr ext  [employer] add  IR/ER  High row  PNF  D1,D2   15#  7.5#  5#  5#  17.5#  7.5#    20  20  20  20 R  20  15 R ea    Shoulder flex / scap to 90      Wall push up   2x10                  Neuromuscular Re-ed (78127)       [place] over SB green  2x10                                Manual Intervention (71418)       R shldr PROM  10'     [address] mobs / gentle distraction  3'     Manual STM to R delt  5'                              Pt. Education:  -pt educated on diagnosis, prognosis and expectations for rehab  -all pt questions were answered    Home Exercise Program:  11/8/22: sleeper stretch  9/9: shoulder flexion wall wash, tband mid row  8/8/22: The following exercises were performed and added to the pt's home program (educated on appropriate frequency, intensity and duration etc.):    Access Code: [email or web address]  URL: [email or web address]  Date: 08/12/2022  Prepared by: Armando Lucas     Exercises  Supine Shoulder Flexion with Dowel - 1 x daily - 7 x weekly - 3 sets - 10 reps  Standing Shoulder Abduction AAROM with Dowel - 1 x daily - 7 x weekly - 3 sets - 10 reps  Supine Shoulder External Rotation in 45 Degrees Abduction AAROM with Dowel - 1 x daily - 7 x weekly - 3 sets - 10 reps  Seated Upper Trapezius Stretch - 1 x daily - 7 x weekly - 3 sets - 10 reps    Therapeutic Exercise and NMR EXR  [x] (37432) Provided verbal/tactile cueing for activities related to strengthening, flexibility, endurance, ROM  for improvements in scapular, scapulothoracic and UE control with self care, reaching, carrying, lifting, house/yardwork, driving/computer work.    [] (34008) Provided verbal/tactile cueing for activities related to improving balance, coordination, kinesthetic sense, posture, motor skill, proprioception  to assist with  scapular, scapulothoracic and UE control with self care, reaching, carrying, lifting, house/yardwork, driving/computer work. Therapeutic Activities:    [] (57425 or 87116) Provided verbal/tactile cueing for activities related to improving balance, coordination, kinesthetic sense, posture, motor skill, proprioception and motor activation to allow for proper function of scapular, scapulothoracic and UE control with self care, carrying, lifting, driving/computer work.      Home Exercise Program:    [x] (83370) Reviewed/Progressed HEP activities related to strengthening, flexibility, endurance, ROM of scapular, scapulothoracic and UE control with self care, reaching, carrying, lifting, house/yardwork, driving/computer work  [] (80009) Reviewed/Progressed HEP activities related to improving balance, coordination, kinesthetic sense, posture, motor skill, proprioception of scapular, scapulothoracic and UE control with self care, reaching, carrying, lifting, house/yardwork, driving/computer work      Manual Treatments:  PROM / STM / Oscillations-Mobs:  G-I, II, III, IV (PA's, Inf., Post.)  [] (81542) Provided manual therapy to mobilize soft tissue/joints of cervical/CT, scapular GHJ and UE for the purpose of modulating pain, promoting relaxation,  increasing ROM, reducing/eliminating soft tissue swelling/inflammation/restriction, improving soft tissue extensibility and allowing for proper ROM for normal function with self care, reaching, carrying, lifting, house/yardwork, driving/computer work    Modalities:    trial without this date    Charges:  Timed Code Treatment Minutes: 47   Total Treatment Minutes: 47       [] EVAL - LOW (58568)   [] EVAL - MOD (92861)  [] EVAL - HIGH (31849)  [] RE-EVAL (34991)  [x] TE ([id number]) x 1     [] Ionto (85091)  [] NMR (30264) x      [] Vaso (51823)  [x] Manual (33670) x 1     [] Ultrasound  [x] TA (74622) x  1    [] Mech Traction (48459)  [] Gait Training x     [x] ES (un) (13762):   [] Aquatic therapy x   [] Other:   [] Group:     GOALS:  Therapist goals for Patient:  Short Term Goals: To be achieved in: 2 weeks  1. Independent in HEP and progression per patient tolerance, in order to prevent re-injury. [] Progressing: [x] Met: [] Not Met: [] Adjusted  2. Patient will have a decrease in pain to facilitate improvement in movement, function, and ADLs as indicated by Functional Deficits. [] Progressing: [x] Met: [] Not Met: [] Adjusted     Long Term Goals: To be achieved in: 8 weeks  1. Disability index score of 10% or less for the UEFI or Quick DASH to assist with reaching prior level of function. [x] Progressing: [] Met: [] Not Met: [] Adjusted  2. Patient will demonstrate increased AROM to 180 to allow for proper joint functioning as indicated by patients Functional Deficits. [x] Progressing: [] Met: [] Not Met: [] Adjusted  3. Patient will demonstrate an increase in Strength to 5/5 to allow for proper functional mobility as indicated by patients Functional Deficits. [] Progressing: [x] Met: [] Not Met: [] Adjusted  4. Patient will return to functional activities including lifting overhead without increased symptoms or restriction. [x] Progressing: [] Met: [] Not Met: [] Adjusted    Overall Progression Towards Functional goals/ Treatment Progress Update:  [x] Patient is progressing as expected towards functional goals listed. [] Progression is slowed due to complexities/Impairments listed. [] Progression has been slowed due to co-morbidities.   [] Plan just implemented, too soon to assess goals progression <30days   [] Goals require adjustment due to lack of progress  [] Patient is not progressing as expected and requires additional follow up with physician  [] Other    Persisting Functional Limitations/Impairments:  []Sitting []Standing   []Transfers  []Sleeping   [x]Reaching [x]Lifting   [x]ADLs [x]Housework  [x]Driving [x]Job related tasks  [x]Sports/Recreation []Other:    ASSESSMENT: Patient tolerated today's treatment session well[facility] without ESTIM this date. PT recommend pt get home pulleys. Treatment/Activity Tolerance:  [x] Patient tolerated treatment session well  [] Patient limited by fatique  [] Patient limited by pain  [] Patient limited by other medical complications  [] Other:     Prognosis:  [x] Good [] Fair  [] Poor    Patient Requires Follow-up: [x] Yes  [] No    Return to Play:    [x]  N/A     []  Stage 1: Intro to Strength   []  Stage 2: Dynamic Strength and Intro to Plyometrics   []  Stage 3: Advanced Plyometrics and Intro to Throwing   []  Stage 4: Sport specific Training/Return to Sport     []  Ready to Return to Play, [employer]   []  Not Ready for Return to Sports   Comments:      Plan for next treatment session:     PLAN: See eval. PT 2x / week for 8 weeks. [x] Continue per plan of care [] Alter current plan (see comments)  [] Plan of care initiated [] Hold pending MD visit [] Discharge    Electronically signed by: LAN Becker 1000      Note: If patient does not return for scheduled/ recommended follow up visits, this note will serve as a discharge from care along with most recent update on progress.

## 2022-11-17 ENCOUNTER — APPOINTMENT (OUTPATIENT)
Dept: PHYSICAL THERAPY | Age: 47
End: 2022-11-17
Payer: COMMERCIAL

## 2022-11-21 ENCOUNTER — HOSPITAL ENCOUNTER (OUTPATIENT)
Dept: PHYSICAL THERAPY | Age: 47
Setting detail: THERAPIES SERIES
Discharge: HOME | End: 2022-11-21
Payer: COMMERCIAL

## 2022-11-21 PROCEDURE — 97530 THERAPEUTIC ACTIVITIES: CPT

## 2022-11-21 PROCEDURE — 97140 MANUAL THERAPY 1/> REGIONS: CPT

## 2022-11-21 PROCEDURE — 97110 THERAPEUTIC EXERCISES: CPT

## 2022-11-21 NOTE — FLOWSHEET NOTE
[facility] Cast  Phone: [phone]  Fax: [phone]    Physical Therapy Treatment Note/ Progress Report:     Date:  2022    Patient Name:  Karis Gallardo  \"WILL\" :  1975  MRN: [id number]  Restrictions/Precautions:    Medical/Treatment Diagnosis Information:  Adhesive capsulitis of right shoulder (M75.01)  Treatment Diagnosis: Decreased Right Shoulder AROM & Strength  Insurance/Certification information:  PT Insurance Information: [employer] ([employer])  Physician Information:  Abel Mcfadden MD  Plan of care signed (Y/N): []  Yes  [x]  No     Date of Patient follow up with Physician:      Progress Report: [x]  Yes  []  No     Date Range for reporting period:  Beginnin22  Ending:     Progress report due (10 Rx/or 30 days whichever is less):  58    Recertification due (POC duration/ or 90 days whichever is less):  22    Visit # Insurance Allowable Auth required? Date Range   24 [place] []  Yes  []  No PCY     Latex Allergy:  [x]NO      []YES  Preferred Language for Healthcare:   [x]English       []other:    Functional Scale:        Date assessed:  FOTO physical FS primary measure score = 53; risk adjusted 53 22   FOTO = 59        10/20/22     Pain level: 4/10     SUBJECTIVE: Pt has week off work, did buy pulleys to use at home and feels they are helping. OBJECTIVE:   10/19/22: Supine Right Shoulder AROM: Flexion: aBduction: IR: ER:   Strength: all 5/5   10/13:  R shldr AROM: flex 165, abd 155, IR 74, ER 59  R shldr MMT: 5 all!    10/10: R shldr ER 72  22: Supine R shoulder AROM Flexion: 160 degrees.    : R shldr PROM: 154 flex, 131 abd, 56 ER, 70 IR  R shldr MMT: 5 flex, 4+ abd, 5 ER/IR, 5 bic/tri    RESTRICTIONS/PRECAUTIONS:     Exercises/Interventions:   R shldr  Therapeutic Exercise (55026) Resistance / level Sets / Seconds  Reps Notes/Cues   UBE  2' for     Pulleys   10 flex, scap, IR behind back 11/14: recommended pt get home pulley   Ball on table roll green     Wall wash flex  Ball on wall circles   red  20 flex  10 cw, ccw    Cane AAROM: IR behind back, ext, abd   HEP          3-way curls  B ER  Bent row 5#  5#  5#  20 ea  15  20 R           Doorway pec stretch  20\"x3     SA punches  SL ER  Sleeper stretch 4#  4#            Therapeutic Activities (06560)       CC: Mid row  Shldr ext  [employer] add  IR/ER  High row  PNF  D1,D2   15#  7.5#  7.5#  5#  17.5#  7.5#    20  20  20  20 R  20  15 R ea    Shoulder flex / scap to 90      Wall push up   2x10                  Neuromuscular Re-ed (34225)       Yara over SB green  2x10                                Manual Intervention (91946)       R shldr PROM  10'     [facility] mobs / gentle distraction      Manual STM to R delt                               Pt. Education:  -pt educated on diagnosis, prognosis and expectations for rehab  -all pt questions were answered    Home Exercise Program:  11/8/22: sleeper stretch  9/9: shoulder flexion wall wash, tband mid row  8/8/22: The following exercises were performed and added to the pt's home program (educated on appropriate frequency, intensity and duration etc.):    Access Code: [email or web address]  URL: [email or web address]  Date: 08/12/2022  Prepared by: Raquel Rivas     Exercises  Supine Shoulder Flexion with Dowel - 1 x daily - 7 x weekly - 3 sets - 10 reps  Standing Shoulder Abduction AAROM with Dowel - 1 x daily - 7 x weekly - 3 sets - 10 reps  Supine Shoulder External Rotation in 45 Degrees Abduction AAROM with Dowel - 1 x daily - 7 x weekly - 3 sets - 10 reps  Seated Upper Trapezius Stretch - 1 x daily - 7 x weekly - 3 sets - 10 reps    Therapeutic Exercise and NMR EXR  [x] (95933) Provided verbal/tactile cueing for activities related to strengthening, flexibility, endurance, ROM  for improvements in scapular, scapulothoracic and UE control with self care, reaching, carrying, lifting, house/yardwork, driving/computer work.    [] (99289) Provided verbal/tactile cueing for activities related to improving balance, coordination, kinesthetic sense, posture, motor skill, proprioception  to assist with  scapular, scapulothoracic and UE control with self care, reaching, carrying, lifting, house/yardwork, driving/computer work. Therapeutic Activities:    [] (98560 or 15677) Provided verbal/tactile cueing for activities related to improving balance, coordination, kinesthetic sense, posture, motor skill, proprioception and motor activation to allow for proper function of scapular, scapulothoracic and UE control with self care, carrying, lifting, driving/computer work.      Home Exercise Program:    [x] (25844) Reviewed/Progressed HEP activities related to strengthening, flexibility, endurance, ROM of scapular, scapulothoracic and UE control with self care, reaching, carrying, lifting, house/yardwork, driving/computer work  [] (59125) Reviewed/Progressed HEP activities related to improving balance, coordination, kinesthetic sense, posture, motor skill, proprioception of scapular, scapulothoracic and UE control with self care, reaching, carrying, lifting, house/yardwork, driving/computer work      Manual Treatments:  PROM / STM / Oscillations-Mobs:  G-I, II, III, IV (PA's, Inf., Post.)  [] (59535) Provided manual therapy to mobilize soft tissue/joints of cervical/CT, scapular GHJ and UE for the purpose of modulating pain, promoting relaxation,  increasing ROM, reducing/eliminating soft tissue swelling/inflammation/restriction, improving soft tissue extensibility and allowing for proper ROM for normal function with self care, reaching, carrying, lifting, house/yardwork, driving/computer work    Modalities: 11/21: IFC to R shldr x 10' - pt seated    Charges:  Timed Code Treatment Minutes: 50   Total Treatment Minutes: 50       [] EVAL - LOW (75243)   [] EVAL - MOD (33600)  [] EVAL - HIGH (38597)  [] RE-EVAL (08577)  [x] TE ([id number]) x 1     [] Ionto (09512)  [] NMR (14228) x      [] Vaso (51312)  [x] Manual (13731) x 1     [] Ultrasound  [x] TA (24816) x  1    [] Mech Traction (33581)  [] Gait Training x     [x] ES (un) (34218):   [] Aquatic therapy x   [] Other:   [] Group:     GOALS:  Therapist goals for Patient:  Short Term Goals: To be achieved in: 2 weeks  1. Independent in HEP and progression per patient tolerance, in order to prevent re-injury. [] Progressing: [x] Met: [] Not Met: [] Adjusted  2. Patient will have a decrease in pain to facilitate improvement in movement, function, and ADLs as indicated by Functional Deficits. [] Progressing: [x] Met: [] Not Met: [] Adjusted     Long Term Goals: To be achieved in: 8 weeks  1. Disability index score of 10% or less for the UEFI or Quick DASH to assist with reaching prior level of function. [x] Progressing: [] Met: [] Not Met: [] Adjusted  2. Patient will demonstrate increased AROM to 180 to allow for proper joint functioning as indicated by patients Functional Deficits. [x] Progressing: [] Met: [] Not Met: [] Adjusted  3. Patient will demonstrate an increase in Strength to 5/5 to allow for proper functional mobility as indicated by patients Functional Deficits. [] Progressing: [x] Met: [] Not Met: [] Adjusted  4. Patient will return to functional activities including lifting overhead without increased symptoms or restriction. [x] Progressing: [] Met: [] Not Met: [] Adjusted    Overall Progression Towards Functional goals/ Treatment Progress Update:  [x] Patient is progressing as expected towards functional goals listed. [] Progression is slowed due to complexities/Impairments listed. [] Progression has been slowed due to co-morbidities.   [] Plan just implemented, too soon to assess goals progression <30days   [] Goals require adjustment due to lack of progress  [] Patient is not progressing as expected and requires additional follow up with physician  [] Other    Persisting Functional Limitations/Impairments:  []Sitting []Standing   []Transfers  []Sleeping   [x]Reaching [x]Lifting   [x]ADLs [x]Housework  [x]Driving [x]Job related tasks  [x]Sports/Recreation []Other:    ASSESSMENT: Patient tolerated today's treatment session well. No issues with strengthening exercises. Better tolerance to PROM today. Did request estim at end of session as he has found this to be helpful in reducing muscle soreness; will continue to use pulleys at home. Treatment/Activity Tolerance:  [x] Patient tolerated treatment session well  [] Patient limited by fatique  [] Patient limited by pain  [] Patient limited by other medical complications  [] Other:     Prognosis:  [x] Good [] Fair  [] Poor    Patient Requires Follow-up: [x] Yes  [] No    Return to Play:    [x]  N/A     []  Stage 1: Intro to Strength   []  Stage 2: Dynamic Strength and Intro to Plyometrics   []  Stage 3: Advanced Plyometrics and Intro to Throwing   []  Stage 4: Sport specific Training/Return to Sport     []  Ready to Return to Play, [employer]   []  Not Ready for Return to Sports   Comments:      Plan for next treatment session:     PLAN: See eval. PT 2x / week for 8 weeks. [x] Continue per plan of care [] Alter current plan (see comments)  [] Plan of care initiated [] Hold pending MD visit [] Discharge    Electronically signed by: SHANA [id number]      Note: If patient does not return for scheduled/ recommended follow up visits, this note will serve as a discharge from care along with most recent update on progress.

## 2022-11-23 ENCOUNTER — HOSPITAL ENCOUNTER (OUTPATIENT)
Dept: PHYSICAL THERAPY | Age: 47
Setting detail: THERAPIES SERIES
Discharge: HOME | End: 2022-11-23
Payer: COMMERCIAL

## 2022-11-23 PROCEDURE — 97140 MANUAL THERAPY 1/> REGIONS: CPT

## 2022-11-23 PROCEDURE — 97110 THERAPEUTIC EXERCISES: CPT

## 2022-11-23 PROCEDURE — 97530 THERAPEUTIC ACTIVITIES: CPT

## 2022-11-23 NOTE — FLOWSHEET NOTE
[facility]  Phone: [phone]  Fax: [phone]    Physical Therapy Treatment Note/ Progress Report:     Date:  2022    Patient Name:  Lizette Ruth  \"WILL\" :  1975  MRN: [id number]  Restrictions/Precautions:    Medical/Treatment Diagnosis Information:  Adhesive capsulitis of right shoulder (M75.01)  Treatment Diagnosis: Decreased Right Shoulder AROM & Strength  Insurance/Certification information:  PT Insurance Information: [employer] (Med [employer])  Physician Information:  Angelina Hernandez MD  Plan of care signed (Y/N): []  Yes  [x]  No     Date of Patient follow up with Physician:      Progress Report: [x]  Yes  []  No     Date Range for reporting period:  Beginnin22  Ending:     Progress report due (10 Rx/or 30 days whichever is less):  3/98/27    Recertification due (POC duration/ or 90 days whichever is less):  22    Visit # Insurance Allowable Auth required? Date Range   25 [place] []  Yes  []  No PCY     Latex Allergy:  [x]NO      []YES  Preferred Language for Healthcare:   [x]English       []other:    Functional Scale:        Date assessed:  FOTO physical FS primary measure score = 53; risk adjusted 53 22   FOTO = 59        10/20/22     Pain level: 4/10     SUBJECTIVE: Pt has week off work, did buy pulleys to use at home and thinks they are helping. Shoulder feels about the same today. OBJECTIVE:   10/19/22: Supine Right Shoulder AROM: Flexion: aBduction: IR: ER:   Strength: all 5/5   10/13:  R shldr AROM: flex 165, abd 155, IR 74, ER 59  R shldr MMT: 5 all!    10/10: R shldr ER 72  22: Supine R shoulder AROM Flexion: 160 degrees.    : R shldr PROM: 154 flex, 131 abd, 56 ER, 70 IR  R shldr MMT: 5 flex, 4+ abd, 5 ER/IR, 5 bic/tri    RESTRICTIONS/PRECAUTIONS:     Exercises/Interventions:   R shldr  Therapeutic Exercise (38581) Resistance / level Sets / Seconds  Reps Notes/Cues   UBE  2' for Pulleys   10 flex, scap, IR behind back 11/14: recommended pt get home pulley   Ball on table roll green     Wall wash flex  Ball on wall circles   red  20 flex  10 cw, ccw    Cane AAROM: IR behind back, ext, abd   HEP          3-way curls  B ER  Bent row 5#  5#  5#            Doorway pec stretch  20\"x3     SA punches  SL ER  Sleeper stretch 4#  4#  20  20 R  10\"x5           Therapeutic Activities (01297)       CC: Mid row  Shldr ext  [employer] add  IR/ER  High row  PNF  D1,D2   15#  7.5#  7.5#  5#  17.5#  7.5#    20  20  20  20 R  20  15 R ea    Shoulder flex / scap to 90      Wall push up   2x10                  Neuromuscular Re-ed (23707)       Yara over SB green  2x10                                Manual Intervention (31978)       R shldr PROM  10'     [address] mobs / gentle distraction      Manual STM to R delt  5'                              Pt. Education:  -pt educated on diagnosis, prognosis and expectations for rehab  -all pt questions were answered    Home Exercise Program:  11/8/22: sleeper stretch  9/9: shoulder flexion wall wash, tband mid row  8/8/22: The following exercises were performed and added to the pt's home program (educated on appropriate frequency, intensity and duration etc.):    Access Code: [email or web address]  URL: [email or web address]  Date: 08/12/2022  Prepared by: Tamika Abraham     Exercises  Supine Shoulder Flexion with Dowel - 1 x daily - 7 x weekly - 3 sets - 10 reps  Standing Shoulder Abduction AAROM with Dowel - 1 x daily - 7 x weekly - 3 sets - 10 reps  Supine Shoulder External Rotation in 45 Degrees Abduction AAROM with Dowel - 1 x daily - 7 x weekly - 3 sets - 10 reps  Seated Upper Trapezius Stretch - 1 x daily - 7 x weekly - 3 sets - 10 reps    Therapeutic Exercise and NMR EXR  [x] (17899) Provided verbal/tactile cueing for activities related to strengthening, flexibility, endurance, ROM  for improvements in scapular, scapulothoracic and UE control with self care, reaching, carrying, lifting, house/yardwork, driving/computer work.    [] (17312) Provided verbal/tactile cueing for activities related to improving balance, coordination, kinesthetic sense, posture, motor skill, proprioception  to assist with  scapular, scapulothoracic and UE control with self care, reaching, carrying, lifting, house/yardwork, driving/computer work. Therapeutic Activities:    [] (88534 or 15987) Provided verbal/tactile cueing for activities related to improving balance, coordination, kinesthetic sense, posture, motor skill, proprioception and motor activation to allow for proper function of scapular, scapulothoracic and UE control with self care, carrying, lifting, driving/computer work.      Home Exercise Program:    [x] (60900) Reviewed/Progressed HEP activities related to strengthening, flexibility, endurance, ROM of scapular, scapulothoracic and UE control with self care, reaching, carrying, lifting, house/yardwork, driving/computer work  [] (30929) Reviewed/Progressed HEP activities related to improving balance, coordination, kinesthetic sense, posture, motor skill, proprioception of scapular, scapulothoracic and UE control with self care, reaching, carrying, lifting, house/yardwork, driving/computer work      Manual Treatments:  PROM / STM / Oscillations-Mobs:  G-I, II, III, IV (PA's, Inf., Post.)  [] (73624) Provided manual therapy to mobilize soft tissue/joints of cervical/CT, scapular GHJ and UE for the purpose of modulating pain, promoting relaxation,  increasing ROM, reducing/eliminating soft tissue swelling/inflammation/restriction, improving soft tissue extensibility and allowing for proper ROM for normal function with self care, reaching, carrying, lifting, house/yardwork, driving/computer work    Modalities: 11/23: IFC to R shldr x 10' - pt seated    Charges:  Timed Code Treatment Minutes: 50   Total Treatment Minutes: 50       [] EVAL - LOW (54266)   [] EVAL - MOD (41512)  [] EVAL - HIGH (03008)  [] RE-EVAL (94235)  [x] TE [phone]) x 1     [] Ionto (20790)  [] NMR (03137) x      [] Vaso (75596)  [x] Manual (08589) x 1     [] Ultrasound  [x] TA (85484) x  1    [] Mech Traction (59682)  [] Gait Training x     [x] ES (un) (44297):   [] Aquatic therapy x   [] Other:   [] Group:     GOALS:  Therapist goals for Patient:  Short Term Goals: To be achieved in: 2 weeks  1. Independent in HEP and progression per patient tolerance, in order to prevent re-injury. [] Progressing: [x] Met: [] Not Met: [] Adjusted  2. Patient will have a decrease in pain to facilitate improvement in movement, function, and ADLs as indicated by Functional Deficits. [] Progressing: [x] Met: [] Not Met: [] Adjusted     Long Term Goals: To be achieved in: 8 weeks  1. Disability index score of 10% or less for the UEFI or Quick DASH to assist with reaching prior level of function. [x] Progressing: [] Met: [] Not Met: [] Adjusted  2. Patient will demonstrate increased AROM to 180 to allow for proper joint functioning as indicated by patients Functional Deficits. [x] Progressing: [] Met: [] Not Met: [] Adjusted  3. Patient will demonstrate an increase in Strength to 5/5 to allow for proper functional mobility as indicated by patients Functional Deficits. [] Progressing: [x] Met: [] Not Met: [] Adjusted  4. Patient will return to functional activities including lifting overhead without increased symptoms or restriction. [x] Progressing: [] Met: [] Not Met: [] Adjusted    Overall Progression Towards Functional goals/ Treatment Progress Update:  [x] Patient is progressing as expected towards functional goals listed. [] Progression is slowed due to complexities/Impairments listed. [] Progression has been slowed due to co-morbidities.   [] Plan just implemented, too soon to assess goals progression <30days   [] Goals require adjustment due to lack of progress  [] Patient is not progressing as expected and requires additional follow up with physician  [] Other    Persisting Functional Limitations/Impairments:  []Sitting []Standing   []Transfers  []Sleeping   [x]Reaching [x]Lifting   [x]ADLs [x]Housework  [x]Driving [x]Job related tasks  [x]Sports/Recreation []Other:    ASSESSMENT: Pt with good exercise tolerance, minimal soreness noted with strengthening exercises. Cues for form with sleeper stretch. Noted that manual STM felt good with fair PROM today. Will take pulleys with him for his upcoming [employer] trip. Treatment/Activity Tolerance:  [x] Patient tolerated treatment session well  [] Patient limited by fatique  [] Patient limited by pain  [] Patient limited by other medical complications  [] Other:     Prognosis:  [x] Good [] Fair  [] Poor    Patient Requires Follow-up: [x] Yes  [] No    Return to Play:    [x]  N/A     []  Stage 1: Intro to Strength   []  Stage 2: Dynamic Strength and Intro to Plyometrics   []  Stage 3: Advanced Plyometrics and Intro to Throwing   []  Stage 4: Sport specific Training/Return to Sport     []  Ready to Return to Play, [employer]   []  Not Ready for Return to Sports   Comments:      Plan for next treatment session: PROGRESS NOTE    PLAN: See eval. PT 2x / week for 8 weeks. [x] Continue per plan of care [] Alter current plan (see comments)  [] Plan of care initiated [] Hold pending MD visit [] Discharge    Electronically signed by: Tiffany Kaur [id number]      Note: If patient does not return for scheduled/ recommended follow up visits, this note will serve as a discharge from care along with most recent update on progress.

## 2022-12-02 ENCOUNTER — HOSPITAL ENCOUNTER (OUTPATIENT)
Dept: PHYSICAL THERAPY | Age: 47
Setting detail: THERAPIES SERIES
Discharge: HOME | End: 2022-12-02
Payer: COMMERCIAL

## 2022-12-02 PROCEDURE — 97140 MANUAL THERAPY 1/> REGIONS: CPT

## 2022-12-02 PROCEDURE — 97110 THERAPEUTIC EXERCISES: CPT

## 2022-12-02 PROCEDURE — 97530 THERAPEUTIC ACTIVITIES: CPT

## 2022-12-02 NOTE — FLOWSHEET NOTE
[facility]ig[facility] Cast  Phone: [phone]  Fax: [phone]    Physical Therapy Treatment Note/ Progress Report:     Date:  2022    Patient Name:  Karan Riley  \"WILL\" :  1975  MRN: [id number]  Restrictions/Precautions:    Medical/Treatment Diagnosis Information:  Adhesive capsulitis of right shoulder (M75.01)  Treatment Diagnosis: Decreased Right Shoulder AROM & Strength  Insurance/Certification information:  PT Insurance Information: [employer] ([employer])  Physician Information:  Monserrat Varghese MD  Plan of care signed (Y/N): []  Yes  [x]  No     Date of Patient follow up with Physician:      Progress Report: [x]  Yes  []  No     Date Range for reporting period:  Beginnin22  Ending:     Progress report due (10 Rx/or 30 days whichever is less):      Recertification due (POC duration/ or 90 days whichever is less):  22    Visit # Insurance Allowable Auth required? Date Range   [place] []  Yes  []  No PCY     Latex Allergy:  [x]NO      []YES  Preferred Language for Healthcare:   [x]English       []other:    Functional Scale:        Date assessed:  FOTO physical FS primary measure score = 53; risk adjusted 53 22   FOTO = 59        10/20/22  FOTO = 54        22     Pain level: 4/10     SUBJECTIVE: Pt reports that his pain is the same, did have a good trip to [facility]. Is hoping to get his arm as strong as possible before seeing MD at the end of the month. OBJECTIVE:   10/19/22: Supine Right Shoulder AROM: Flexion: aBduction: IR: ER:   Strength: all 5/5   10/13:  R shldr AROM: flex 165, abd 155, IR 74, ER 59  R shldr MMT: 5 all!    10/10: R shldr ER 72  22: Supine R shoulder AROM Flexion: 160 degrees.    : R shldr PROM: 154 flex, 131 abd, 56 ER, 70 IR  R shldr MMT: 5 flex, 4+ abd, 5 ER/IR, 5 bic/tri    RESTRICTIONS/PRECAUTIONS:     Exercises/Interventions:   R shldr  Therapeutic Exercise (48233) Resistance / level Sets / Seconds  Reps Notes/Cues   UBE  2' for     Pulleys   10 flex, scap, IR behind back 11/14: recommended pt get home pulley   Ball on table roll green     Wall wash flex  Ball on wall circles   red  20 flex  10 cw, ccw    Cane AAROM: IR behind back, ext, abd   HEP          3-way curls  B ER  Bent row 5#  5#  5#            Doorway pec stretch  20\"x3     SA punches  SL ER  Sleeper stretch 5#  5#  20  20 R  10\"x5           Therapeutic Activities (08906)       CC: Mid row  Shldr ext  [employer] add  IR/ER  High row  PNF  D1,D2   15#  10#  10#  5#  17.5#  7.5#    20  20  20  20 R  20  15 R ea    Shoulder flex / scap to 90      Wall push up   2x10                  Neuromuscular Re-ed (31456)       Yara over SB green  2x10                                Manual Intervention (51956)       R shldr PROM  10'     [address] mobs / gentle distraction      Manual STM to R delt                               Pt. Education:  -pt educated on diagnosis, prognosis and expectations for rehab  -all pt questions were answered    Home Exercise Program:  11/8/22: sleeper stretch  9/9: shoulder flexion wall wash, tband mid row  8/8/22: The following exercises were performed and added to the pt's home program (educated on appropriate frequency, intensity and duration etc.):    Access Code: [email or web address]  URL: [email or web address]  Date: 08/12/2022  Prepared by: Bhumika Obregon     Exercises  Supine Shoulder Flexion with Dowel - 1 x daily - 7 x weekly - 3 sets - 10 reps  Standing Shoulder Abduction AAROM with Dowel - 1 x daily - 7 x weekly - 3 sets - 10 reps  Supine Shoulder External Rotation in 45 Degrees Abduction AAROM with Dowel - 1 x daily - 7 x weekly - 3 sets - 10 reps  Seated Upper Trapezius Stretch - 1 x daily - 7 x weekly - 3 sets - 10 reps    Therapeutic Exercise and NMR EXR  [x] (20896) Provided verbal/tactile cueing for activities related to strengthening, flexibility, endurance, ROM  for improvements in scapular, scapulothoracic and UE control with self care, reaching, carrying, lifting, house/yardwork, driving/computer work.    [] (61127) Provided verbal/tactile cueing for activities related to improving balance, coordination, kinesthetic sense, posture, motor skill, proprioception  to assist with  scapular, scapulothoracic and UE control with self care, reaching, carrying, lifting, house/yardwork, driving/computer work. Therapeutic Activities:    [] (67806 or 44252) Provided verbal/tactile cueing for activities related to improving balance, coordination, kinesthetic sense, posture, motor skill, proprioception and motor activation to allow for proper function of scapular, scapulothoracic and UE control with self care, carrying, lifting, driving/computer work.      Home Exercise Program:    [x] (80311) Reviewed/Progressed HEP activities related to strengthening, flexibility, endurance, ROM of scapular, scapulothoracic and UE control with self care, reaching, carrying, lifting, house/yardwork, driving/computer work  [] (03262) Reviewed/Progressed HEP activities related to improving balance, coordination, kinesthetic sense, posture, motor skill, proprioception of scapular, scapulothoracic and UE control with self care, reaching, carrying, lifting, house/yardwork, driving/computer work      Manual Treatments:  PROM / STM / Oscillations-Mobs:  G-I, II, III, IV (PA's, Inf., Post.)  [] (86505) Provided manual therapy to mobilize soft tissue/joints of cervical/CT, scapular GHJ and UE for the purpose of modulating pain, promoting relaxation,  increasing ROM, reducing/eliminating soft tissue swelling/inflammation/restriction, improving soft tissue extensibility and allowing for proper ROM for normal function with self care, reaching, carrying, lifting, house/yardwork, driving/computer work    Modalities: 12/2/22: IFC to R shldr x 10' - pt seated    Charges:  Timed Code Treatment Minutes: 50   Total Treatment Minutes: 50 [] EVAL - LOW (92079)   [] EVAL - MOD (07223)  [] EVAL - HIGH (68367)  [] RE-EVAL (47824)  [x] TE (34904) x 1     [] Ionto (75693)  [] NMR (18445) x      [] Vaso (71242)  [x] Manual (96138) x 1     [] Ultrasound  [x] TA (16523) x  1    [] Mech Traction (37786)  [] Gait Training x     [x] ES (un) (70834):   [] Aquatic therapy x   [] Other:   [] Group:     GOALS:  Therapist goals for Patient:  Short Term Goals: To be achieved in: 2 weeks  1. Independent in HEP and progression per patient tolerance, in order to prevent re-injury. [] Progressing: [x] Met: [] Not Met: [] Adjusted  2. Patient will have a decrease in pain to facilitate improvement in movement, function, and ADLs as indicated by Functional Deficits. [] Progressing: [x] Met: [] Not Met: [] Adjusted     Long Term Goals: To be achieved in: 8 weeks  1. Disability index score of 10% or less for the UEFI or Quick DASH to assist with reaching prior level of function. [x] Progressing: [] Met: [] Not Met: [] Adjusted  2. Patient will demonstrate increased AROM to 180 to allow for proper joint functioning as indicated by patients Functional Deficits. [x] Progressing: [] Met: [] Not Met: [] Adjusted  3. Patient will demonstrate an increase in Strength to 5/5 to allow for proper functional mobility as indicated by patients Functional Deficits. [] Progressing: [x] Met: [] Not Met: [] Adjusted  4. Patient will return to functional activities including lifting overhead without increased symptoms or restriction. [x] Progressing: [] Met: [] Not Met: [] Adjusted    Overall Progression Towards Functional goals/ Treatment Progress Update:  [x] Patient is progressing as expected towards functional goals listed. [] Progression is slowed due to complexities/Impairments listed. [] Progression has been slowed due to co-morbidities.   [] Plan just implemented, too soon to assess goals progression <30days   [] Goals require adjustment due to lack of progress  [] Patient is not progressing as expected and requires additional follow up with physician  [] Other    Persisting Functional Limitations/Impairments:  []Sitting []Standing   []Transfers  []Sleeping   [x]Reaching [x]Lifting   [x]ADLs [x]Housework  [x]Driving [x]Job related tasks  [x]Sports/Recreation []Other:    ASSESSMENT: Pt tolerated today's session well. Mild soreness in R shoulder after CC strengthening exercises. PROM within same ranges as previous visit. Pt likes to end session with estim. Pt with decreased FOTO score today, stated that he has seen significant improvement in his AROM, just still painful in his shoulder. Treatment/Activity Tolerance:  [x] Patient tolerated treatment session well  [] Patient limited by fatique  [] Patient limited by pain  [] Patient limited by other medical complications  [] Other:     Prognosis:  [x] Good [] Fair  [] Poor    Patient Requires Follow-up: [x] Yes  [] No    Return to Play:    [x]  N/A     []  Stage 1: Intro to Strength   []  Stage 2: Dynamic Strength and Intro to Plyometrics   []  Stage 3: Advanced Plyometrics and Intro to Throwing   []  Stage 4: Sport specific Training/Return to Sport     []  Ready to Return to Play, [employer]   []  Not Ready for Return to Sports   Comments:      Plan for next treatment session:     PLAN: See nimo. PT 2x / week for 8 weeks. [x] Continue per plan of care [] Alter current plan (see comments)  [] Plan of care initiated [] Hold pending MD visit [] Discharge    Electronically signed by: Jan Tello, [id number]      Note: If patient does not return for scheduled/ recommended follow up visits, this note will serve as a discharge from care along with most recent update on progress.

## 2022-12-06 ENCOUNTER — APPOINTMENT (OUTPATIENT)
Dept: PHYSICAL THERAPY | Age: 47
End: 2022-12-06
Payer: COMMERCIAL

## 2022-12-08 ENCOUNTER — HOSPITAL ENCOUNTER (OUTPATIENT)
Dept: PHYSICAL THERAPY | Age: 47
Setting detail: THERAPIES SERIES
Discharge: HOME | End: 2022-12-08
Payer: COMMERCIAL

## 2022-12-08 PROCEDURE — 97530 THERAPEUTIC ACTIVITIES: CPT

## 2022-12-08 PROCEDURE — 97110 THERAPEUTIC EXERCISES: CPT

## 2022-12-08 PROCEDURE — 97140 MANUAL THERAPY 1/> REGIONS: CPT

## 2022-12-08 PROCEDURE — G0283 ELEC STIM OTHER THAN WOUND: HCPCS

## 2022-12-08 NOTE — FLOWSHEET NOTE
[facility]  Phone: [phone]  Fax: [phone]    Physical Therapy Treatment Note/ Progress Report:     Date:  2022    Patient Name:  Jeffrey Starr  \"WILL\" :  1975  MRN: [id number]  Restrictions/Precautions:    Medical/Treatment Diagnosis Information:  Adhesive capsulitis of right shoulder (M75.01)  Treatment Diagnosis: Decreased Right Shoulder AROM & Strength  Insurance/Certification information:  PT Insurance Information: [employer] ([employer])  Physician Information:  Liana Michael MD  Plan of care signed (Y/N): []  Yes  [x]  No     Date of Patient follow up with Physician:      Progress Report: [x]  Yes  []  No     Date Range for reporting period:  Beginnin22  Ending:     Progress report due (10 Rx/or 30 days whichever is less):  3/78/51    Recertification due (POC duration/ or 90 days whichever is less):  22    Visit # Insurance Allowable Auth required? Date Range   27 [place] []  Yes  []  No PCY     Latex Allergy:  [x]NO      []YES  Preferred Language for Healthcare:   [x]English       []other:    Functional Scale:        Date assessed:  FOTO physical FS primary measure score = 53; risk adjusted 53 22   FOTO = 59        10/20/22  FOTO = 54        22     Pain level: 4/10     SUBJECTIVE: Pt reports same pain, is excited at possibility of another injection at end of month. Concerned about [address] not reaching an agreement for next year. OBJECTIVE:   22:  Supine R shldr AROM: flex 171 *pain, abd 144, IR 77, ER 70    10/19/22: Strength: all /5   10/13:  R shldr AROM: flex 165, abd 155, IR 74, ER 59  R shldr MMT: 5 all!    10/10: R shldr ER 72  22: Supine R shoulder AROM Flexion: 160 degrees.    : R shldr PROM: 154 flex, 131 abd, 56 ER, 70 IR  R shldr MMT: 5 flex, 4+ abd, 5 ER/IR, 5 bic/tri    RESTRICTIONS/PRECAUTIONS:     Exercises/Interventions:   R shldr  Therapeutic Exercise (95264) Resistance / level Sets / Seconds  Reps Notes/Cues   UBE  2' for     Pulleys   10 flex, scap, IR behind back 11/14: recommended pt get home pulley   Ball on table roll green     Wall wash flex  Ball on wall circles   red  20 cw, ccw    Cane AAROM: IR behind back, ext, abd   HEP          3-way curls  B ER  Bent row 5#  5#  5#  20 ea  15  20 R           Doorway pec stretch  20\"x3     SA punches  SL ER  Sleeper stretch 5#  5#  20  20 R  10\"x5           Therapeutic Activities (16884)       CC: Mid row  Shldr ext  [employer] add  IR/ER  High row  PNF  D1,D2   15#  10#  10#  5#  17.5#  7.5#    20  20  20  20 R  20  15 R ea    Shoulder flex / scap to 90      Wall push up   2x10                  Neuromuscular Re-ed (05207)       Yara over SB green  2x10                                Manual Intervention (70773)       R shldr PROM  10'     [address] mobs / gentle distraction      Manual STM to R delt  5'                              Pt. Education:  -pt educated on diagnosis, prognosis and expectations for rehab  -all pt questions were answered    Home Exercise Program:  11/8/22: sleeper stretch  9/9: shoulder flexion wall wash, tband mid row  8/8/22: The following exercises were performed and added to the pt's home program (educated on appropriate frequency, intensity and duration etc.):    Access Code: [email or web address]  URL: [email or web address]  Date: 08/12/2022  Prepared by: Sara Toledo     Exercises  Supine Shoulder Flexion with Dowel - 1 x daily - 7 x weekly - 3 sets - 10 reps  Standing Shoulder Abduction AAROM with Dowel - 1 x daily - 7 x weekly - 3 sets - 10 reps  Supine Shoulder External Rotation in 45 Degrees Abduction AAROM with Dowel - 1 x daily - 7 x weekly - 3 sets - 10 reps  Seated Upper Trapezius Stretch - 1 x daily - 7 x weekly - 3 sets - 10 reps    Therapeutic Exercise and NMR EXR  [x] (73863) Provided verbal/tactile cueing for activities related to strengthening, flexibility, endurance, ROM for improvements in scapular, scapulothoracic and UE control with self care, reaching, carrying, lifting, house/yardwork, driving/computer work.    [] (84227) Provided verbal/tactile cueing for activities related to improving balance, coordination, kinesthetic sense, posture, motor skill, proprioception  to assist with  scapular, scapulothoracic and UE control with self care, reaching, carrying, lifting, house/yardwork, driving/computer work. Therapeutic Activities:    [] (45977 or 21261) Provided verbal/tactile cueing for activities related to improving balance, coordination, kinesthetic sense, posture, motor skill, proprioception and motor activation to allow for proper function of scapular, scapulothoracic and UE control with self care, carrying, lifting, driving/computer work.      Home Exercise Program:    [x] (72889) Reviewed/Progressed HEP activities related to strengthening, flexibility, endurance, ROM of scapular, scapulothoracic and UE control with self care, reaching, carrying, lifting, house/yardwork, driving/computer work  [] (57414) Reviewed/Progressed HEP activities related to improving balance, coordination, kinesthetic sense, posture, motor skill, proprioception of scapular, scapulothoracic and UE control with self care, reaching, carrying, lifting, house/yardwork, driving/computer work      Manual Treatments:  PROM / STM / Oscillations-Mobs:  G-I, II, III, IV (PA's, Inf., Post.)  [] (07279) Provided manual therapy to mobilize soft tissue/joints of cervical/CT, scapular GHJ and UE for the purpose of modulating pain, promoting relaxation,  increasing ROM, reducing/eliminating soft tissue swelling/inflammation/restriction, improving soft tissue extensibility and allowing for proper ROM for normal function with self care, reaching, carrying, lifting, house/yardwork, driving/computer work    Modalities: 12/8/22: IFC to R shldr x 10' - pt seated    Charges:  Timed Code Treatment Minutes: 54   Total Treatment Minutes: 54       [] EVAL - LOW (85687)   [] EVAL - MOD (58829)  [] EVAL - HIGH (38793)  [] RE-EVAL (11843)  [x] TE (09508) x 1     [] Ionto (17601)  [] NMR (97336) x      [] Vaso (75477)  [x] Manual (62782) x 1     [] Ultrasound  [x] TA (69167) x  1    [] Mech Traction (39018)  [] Gait Training x     [x] ES (un) (12926):   [] Aquatic therapy x   [] Other:   [] Group:     GOALS:  Therapist goals for Patient:  Short Term Goals: To be achieved in: 2 weeks  1. Independent in HEP and progression per patient tolerance, in order to prevent re-injury. [] Progressing: [x] Met: [] Not Met: [] Adjusted  2. Patient will have a decrease in pain to facilitate improvement in movement, function, and ADLs as indicated by Functional Deficits. [] Progressing: [x] Met: [] Not Met: [] Adjusted     Long Term Goals: To be achieved in: 8 weeks  1. Disability index score of 10% or less for the UEFI or Quick DASH to assist with reaching prior level of function. [x] Progressing: [] Met: [] Not Met: [] Adjusted  2. Patient will demonstrate increased AROM to 180 to allow for proper joint functioning as indicated by patients Functional Deficits. [x] Progressing: [] Met: [] Not Met: [] Adjusted  3. Patient will demonstrate an increase in Strength to 5/5 to allow for proper functional mobility as indicated by patients Functional Deficits. [] Progressing: [x] Met: [] Not Met: [] Adjusted  4. Patient will return to functional activities including lifting overhead without increased symptoms or restriction. [x] Progressing: [] Met: [] Not Met: [] Adjusted    Overall Progression Towards Functional goals/ Treatment Progress Update:  [x] Patient is progressing as expected towards functional goals listed. [] Progression is slowed due to complexities/Impairments listed. [] Progression has been slowed due to co-morbidities.   [] Plan just implemented, too soon to assess goals progression <30days   [] Goals require adjustment due to lack of progress  [] Patient is not progressing as expected and requires additional follow up with physician  [] Other    Persisting Functional Limitations/Impairments:  []Sitting []Standing   []Transfers  []Sleeping   [x]Reaching [x]Lifting   [x]ADLs [x]Housework  [x]Driving [x]Job related tasks  [x]Sports/Recreation []Other:    ASSESSMENT: Pt with good exercise tolerance. Demonstrated slightly improved R shldr AROM this date, remains painful into flexion but much better with ER. Did well with strengthening exercises; did better with ball on wall circles with shldr at 90 flex. Treatment/Activity Tolerance:  [x] Patient tolerated treatment session well  [] Patient limited by fatique  [] Patient limited by pain  [] Patient limited by other medical complications  [] Other:     Prognosis:  [x] Good [] Fair  [] Poor    Patient Requires Follow-up: [x] Yes  [] No    Return to Play:    [x]  N/A     []  Stage 1: Intro to Strength   []  Stage 2: Dynamic Strength and Intro to Plyometrics   []  Stage 3: Advanced Plyometrics and Intro to Throwing   []  Stage 4: Sport specific Training/Return to Sport     []  Ready to Return to Play, [employer]   []  Not Ready for Return to Sports   Comments:      Plan for next treatment session:     PLAN: See eval. PT 2x / week for 8 weeks. [x] Continue per plan of care [] Alter current plan (see comments)  [] Plan of care initiated [] Hold pending MD visit [] Discharge    Electronically signed by: Brandt Mars, [id number]      Note: If patient does not return for scheduled/ recommended follow up visits, this note will serve as a discharge from care along with most recent update on progress.

## 2022-12-09 DIAGNOSIS — M19.072 PRIMARY OSTEOARTHRITIS OF LEFT ANKLE: ICD-10-CM

## 2022-12-09 DIAGNOSIS — G40.209 PARTIAL EPILEPSY WITH IMPAIRMENT OF CONSCIOUSNESS (HCC): ICD-10-CM

## 2022-12-09 DIAGNOSIS — K21.9 GASTRO-ESOPHAGEAL REFLUX DISEASE WITHOUT ESOPHAGITIS: ICD-10-CM

## 2022-12-09 DIAGNOSIS — F51.01 PRIMARY INSOMNIA: ICD-10-CM

## 2022-12-09 RX ORDER — OMEPRAZOLE 40 MG/1
CAPSULE, DELAYED RELEASE ORAL
Qty: 90 CAPSULE | Refills: 2 | Status: SHIPPED | OUTPATIENT
Start: 2022-12-09

## 2022-12-09 RX ORDER — MELOXICAM 15 MG/1
TABLET ORAL
Qty: 90 TABLET | Refills: 2 | Status: SHIPPED | OUTPATIENT
Start: 2022-12-09

## 2022-12-09 RX ORDER — TRAZODONE HYDROCHLORIDE 50 MG/1
TABLET ORAL
Qty: 180 TABLET | Refills: 2 | Status: SHIPPED | OUTPATIENT
Start: 2022-12-09

## 2022-12-09 RX ORDER — LEVETIRACETAM 1000 MG/1
TABLET ORAL
Qty: 180 TABLET | Refills: 2 | Status: SHIPPED | OUTPATIENT
Start: 2022-12-09

## 2022-12-09 NOTE — TELEPHONE ENCOUNTER
Medication:   Requested Prescriptions     Pending Prescriptions Disp Refills    omeprazole (PRILOSEC) 40 MG delayed release capsule [Pharmacy Med Name: OMEPRAZOLE DR CAPS 40MG] 90 capsule 3     Sig: TAKE 1 CAPSULE DAILY    traZODone (DESYREL) 50 MG tablet [Pharmacy Med Name: TRAZODONE HCL TABS 50MG] 180 tablet 3     Sig: TAKE 1 TO 2 TABLETS AT BEDTIME AS NEEDED FOR INSOMNIA    levETIRAcetam (KEPPRA) 1000 MG tablet [Pharmacy Med Name: LEVETIRACETAM TABS 1000MG] 180 tablet 3     Sig: TAKE 1 TABLET TWICE A DAY    meloxicam (MOBIC) 15 MG tablet [Pharmacy Med Name: MELOXICAM TABS 15MG] 90 tablet 3     Sig: TAKE 1 TABLET DAILY        Last Filled:  7/22/2 90 tabs 3 refills     Patient Phone Number: [phone] (home) [phone] (work)    Last appt: 8/15/2022   Next appt: Visit date not found    Last OARRS: No flowsheet data found.

## 2022-12-15 ENCOUNTER — HOSPITAL ENCOUNTER (OUTPATIENT)
Dept: PHYSICAL THERAPY | Age: 47
Setting detail: THERAPIES SERIES
Discharge: HOME | End: 2022-12-15
Payer: COMMERCIAL

## 2022-12-15 PROCEDURE — 97110 THERAPEUTIC EXERCISES: CPT

## 2022-12-15 PROCEDURE — 97140 MANUAL THERAPY 1/> REGIONS: CPT

## 2022-12-15 PROCEDURE — G0283 ELEC STIM OTHER THAN WOUND: HCPCS

## 2022-12-15 PROCEDURE — 97530 THERAPEUTIC ACTIVITIES: CPT

## 2022-12-15 NOTE — FLOWSHEET NOTE
[facility]  Phone: [phone]  Fax: [phone]    Physical Therapy Treatment Note/ Progress Report:     Date:  12/15/2022    Patient Name:  Fabiola Santos  \"WILL\" :  1975  MRN: [id number]  Restrictions/Precautions:    Medical/Treatment Diagnosis Information:  Adhesive capsulitis of right shoulder (M75.01)  Treatment Diagnosis: Decreased Right Shoulder AROM & Strength  Insurance/Certification information:  PT Insurance Information: [employer] ([employer])  Physician Information:  Ena Gorman MD  Plan of care signed (Y/N): [x]  Yes  []  No     Date of Patient follow up with Physician:      Progress Report: [x]  Yes  []  No     Date Range for reporting period:  Beginnin22  Ending:     Progress report due (10 Rx/or 30 days whichever is less):  1/44/10    Recertification due (POC duration/ or 90 days whichever is less):  22    Visit # Insurance Allowable Auth required? Date Range   [place] []  Yes  []  No PCY     Latex Allergy:  [x]NO      []YES  Preferred Language for Healthcare:   [x]English       []other:    Functional Scale:        Date assessed:  FOTO physical FS primary measure score = 53; risk adjusted 53 22   FOTO = 59        10/20/22  FOTO = 54        22     Pain level: 4/10 R shldr     SUBJECTIVE: Pt reports that his shoulder is about the same. Can reach into top shelf of cabinet but is painful. Nora Lara has been going well. OBJECTIVE:   22:  Supine R shldr AROM: flex 171 *pain, abd 144, IR 77, ER 70    10/19/22: Strength: all 5/5   10/13:  R shldr AROM: flex 165, abd 155, IR 74, ER 59  R shldr MMT: 5 all!    10/10: R shldr ER 72  22: Supine R shoulder AROM Flexion: 160 degrees.    : R shldr PROM: 154 flex, 131 abd, 56 ER, 70 IR  R shldr MMT: 5 flex, 4+ abd, 5 ER/IR, 5 bic/tri    RESTRICTIONS/PRECAUTIONS:     Exercises/Interventions:   R shldr  Therapeutic Exercise (52194) Resistance / level Sets / Seconds  Reps Notes/Cues   UBE  2' for     Pulleys   10 flex, scap, IR behind back 11/14: recommended pt get home pulley   Ball on table roll green     Wall wash flex  Ball on wall circles   red  20 cw, ccw    Cane AAROM: IR behind back, ext, abd   HEP          3-way curls  B ER  Bent row 5#  5#  5#  20 ea  15  20 R           Doorway pec stretch  20\"x3     SA punches  SL ER  Sleeper stretch 5#  5#  20  20 R  10\"x5           Therapeutic Activities (82363)       CC: Mid row  Shldr ext  [employer] add  IR/ER  High row  PNF  D1,D2   15#  10#  10#  5#  17.5#  7.5#    20  20  20  20 R  20  15 R ea    Shoulder flex / scap to 90      Wall push up   2x10                  Neuromuscular Re-ed (89964)       [place] over SB green  2x10    Shoulder BOSU   X 10 f/b, s/s, circles    Body blade: ER yellow  3x15\"                  Manual Intervention (97710)       R shldr PROM  10'     [address] mobs / gentle distraction      Manual STM to R delt                               Pt. Education:  -pt educated on diagnosis, prognosis and expectations for rehab  -all pt questions were answered    Home Exercise Program:  11/8/22: sleeper stretch  9/9: shoulder flexion wall wash, tband mid row  8/8/22: The following exercises were performed and added to the pt's home program (educated on appropriate frequency, intensity and duration etc.):    Access Code: [email or web address]  URL: [email or web address]  Date: 08/12/2022  Prepared by: Ambar Sawyer     Exercises  Supine Shoulder Flexion with Dowel - 1 x daily - 7 x weekly - 3 sets - 10 reps  Standing Shoulder Abduction AAROM with Dowel - 1 x daily - 7 x weekly - 3 sets - 10 reps  Supine Shoulder External Rotation in 45 Degrees Abduction AAROM with Dowel - 1 x daily - 7 x weekly - 3 sets - 10 reps  Seated Upper Trapezius Stretch - 1 x daily - 7 x weekly - 3 sets - 10 reps    Therapeutic Exercise and NMR EXR  [x] (55174) Provided verbal/tactile cueing for activities related to strengthening, flexibility, endurance, ROM  for improvements in scapular, scapulothoracic and UE control with self care, reaching, carrying, lifting, house/yardwork, driving/computer work.    [] (27987) Provided verbal/tactile cueing for activities related to improving balance, coordination, kinesthetic sense, posture, motor skill, proprioception  to assist with  scapular, scapulothoracic and UE control with self care, reaching, carrying, lifting, house/yardwork, driving/computer work. Therapeutic Activities:    [] (46616 or 06880) Provided verbal/tactile cueing for activities related to improving balance, coordination, kinesthetic sense, posture, motor skill, proprioception and motor activation to allow for proper function of scapular, scapulothoracic and UE control with self care, carrying, lifting, driving/computer work.      Home Exercise Program:    [x] (34764) Reviewed/Progressed HEP activities related to strengthening, flexibility, endurance, ROM of scapular, scapulothoracic and UE control with self care, reaching, carrying, lifting, house/yardwork, driving/computer work  [] (62170) Reviewed/Progressed HEP activities related to improving balance, coordination, kinesthetic sense, posture, motor skill, proprioception of scapular, scapulothoracic and UE control with self care, reaching, carrying, lifting, house/yardwork, driving/computer work      Manual Treatments:  PROM / STM / Oscillations-Mobs:  G-I, II, III, IV (PA's, Inf., Post.)  [] (19035) Provided manual therapy to mobilize soft tissue/joints of cervical/CT, scapular GHJ and UE for the purpose of modulating pain, promoting relaxation,  increasing ROM, reducing/eliminating soft tissue swelling/inflammation/restriction, improving soft tissue extensibility and allowing for proper ROM for normal function with self care, reaching, carrying, lifting, house/yardwork, driving/computer work    Modalities: 12/15/22: IFC to [place] x 10' - pt seated    Charges:  Timed Code Treatment Minutes: 55   Total Treatment Minutes: 55       [] EVAL - LOW (48292)   [] EVAL - MOD (89686)  [] EVAL - HIGH (76975)  [] RE-EVAL (20259)  [x] TE (88773) x 1     [] Ionto (52418)  [] NMR (71005) x      [] Vaso (62800)  [x] Manual (04874) x 1     [] Ultrasound  [x] TA (59902) x  1    [] Mech Traction (16546)  [] Gait Training x     [x] ES (un) (49917):   [] Aquatic therapy x   [] Other:   [] Group:     GOALS:  Therapist goals for Patient:  Short Term Goals: To be achieved in: 2 weeks  1. Independent in HEP and progression per patient tolerance, in order to prevent re-injury. [] Progressing: [x] Met: [] Not Met: [] Adjusted  2. Patient will have a decrease in pain to facilitate improvement in movement, function, and ADLs as indicated by Functional Deficits. [] Progressing: [x] Met: [] Not Met: [] Adjusted     Long Term Goals: To be achieved in: 8 weeks  1. Disability index score of 10% or less for the UEFI or Quick DASH to assist with reaching prior level of function. [x] Progressing: [] Met: [] Not Met: [] Adjusted  2. Patient will demonstrate increased AROM to 180 to allow for proper joint functioning as indicated by patients Functional Deficits. [x] Progressing: [] Met: [] Not Met: [] Adjusted  3. Patient will demonstrate an increase in Strength to 5/5 to allow for proper functional mobility as indicated by patients Functional Deficits. [] Progressing: [x] Met: [] Not Met: [] Adjusted  4. Patient will return to functional activities including lifting overhead without increased symptoms or restriction. [x] Progressing: [] Met: [] Not Met: [] Adjusted    Overall Progression Towards Functional goals/ Treatment Progress Update:  [x] Patient is progressing as expected towards functional goals listed. [] Progression is slowed due to complexities/Impairments listed. [] Progression has been slowed due to co-morbidities.   [] Plan just implemented, too soon to assess goals progression <30days   [] Goals require adjustment due to lack of progress  [] Patient is not progressing as expected and requires additional follow up with physician  [] Other    Persisting Functional Limitations/Impairments:  []Sitting []Standing   []Transfers  []Sleeping   [x]Reaching [x]Lifting   [x]ADLs [x]Housework  [x]Driving [x]Job related tasks  [x]Sports/Recreation []Other:    ASSESSMENT: Pt tolerated today's session well. Increased tightness and pain into shoulder flexion PROM. Challenged by body blade ER. Has demonstrated improved AROM and strength but pain remains. Treatment/Activity Tolerance:  [x] Patient tolerated treatment session well  [] Patient limited by fatique  [] Patient limited by pain  [] Patient limited by other medical complications  [] Other:     Prognosis:  [x] Good [] Fair  [] Poor    Patient Requires Follow-up: [x] Yes  [] No    Return to Play:    [x]  N/A     []  Stage 1: Intro to Strength   []  Stage 2: Dynamic Strength and Intro to Plyometrics   []  Stage 3: Advanced Plyometrics and Intro to Throwing   []  Stage 4: Sport specific Training/Return to Sport     []  Ready to Return to Play, [employer]   []  Not Ready for Return to Sports   Comments:      Plan for next treatment session:     PLAN: See eval. PT 2x / week for 8 weeks. [x] Continue per plan of care [] Alter current plan (see comments)  [] Plan of care initiated [] Hold pending MD visit [] Discharge    Electronically signed by: Joel De Jesus, [id number]      Note: If patient does not return for scheduled/ recommended follow up visits, this note will serve as a discharge from care along with most recent update on progress.

## 2022-12-21 ENCOUNTER — HOSPITAL ENCOUNTER (OUTPATIENT)
Dept: PHYSICAL THERAPY | Age: 47
Setting detail: THERAPIES SERIES
Discharge: HOME | End: 2022-12-21
Payer: COMMERCIAL

## 2022-12-21 PROCEDURE — 97110 THERAPEUTIC EXERCISES: CPT

## 2022-12-21 PROCEDURE — 97530 THERAPEUTIC ACTIVITIES: CPT

## 2022-12-21 PROCEDURE — 97140 MANUAL THERAPY 1/> REGIONS: CPT

## 2022-12-21 PROCEDURE — G0283 ELEC STIM OTHER THAN WOUND: HCPCS

## 2022-12-21 NOTE — FLOWSHEET NOTE
[facility]  Phone: [phone]  Fax: [phone]    Physical Therapy Treatment Note/ Progress Report:     Date:  2022    Patient Name:  Lisandra Salinas  \"WILL\" :  1975  MRN: [id number]  Restrictions/Precautions:    Medical/Treatment Diagnosis Information:  Adhesive capsulitis of right shoulder (M75.01)  Treatment Diagnosis: Decreased Right Shoulder AROM & Strength  Insurance/Certification information:  PT Insurance Information: [employer] ([employer])  Physician Information:  Coco Sandy MD  Plan of care signed (Y/N): [x]  Yes  []  No     Date of Patient follow up with Physician:      Progress Report: [x]  Yes  []  No     Date Range for reporting period:  Beginnin22  Ending:     Progress report due (10 Rx/or 30 days whichever is less):      Recertification due (POC duration/ or 90 days whichever is less):  22    Visit # Insurance Allowable Auth required? Date Range   [place] []  Yes  []  No PCY     Latex Allergy:  [x]NO      []YES  Preferred Language for Healthcare:   [x]English       []other:    Functional Scale:        Date assessed:  FOTO physical FS primary measure score = 53; risk adjusted 53 22   FOTO = 59        10/20/22  FOTO = 54        22     Pain level: 10 R shldr     SUBJECTIVE: Pt states he is having more pain today    OBJECTIVE:   22:  Supine R shldr AROM: flex 171 *pain, abd 144, IR 77, ER 70    10/19/22: Strength: all 5/5   10/13:  R shldr AROM: flex 165, abd 155, IR 74, ER 59  R shldr MMT: 5 all!    10/10: R shldr ER 72  22: Supine R shoulder AROM Flexion: 160 degrees.    : R shldr PROM: 154 flex, 131 abd, 56 ER, 70 IR  R shldr MMT: 5 flex, 4+ abd, 5 ER/IR, 5 bic/tri    RESTRICTIONS/PRECAUTIONS:     Exercises/Interventions:   R shldr  Therapeutic Exercise (26088) Resistance / level Sets / Seconds  Reps Notes/Cues   UBE  2' for     Pulleys   10 flex, scap, IR behind back 11/14: recommended pt get home pulley   Ball on table roll green     Wall wash flex  Ball on wall circles   red  20 cw, ccw    Cane AAROM: IR behind back, ext, abd   HEP          3-way curls  B ER  Bent row 5#  5#  5#  20 ea  15  20 R           Doorway pec stretch  20\"x3     SA punches  SL ER  Sleeper stretch 5#  5#  20  20 R  10\"x5           Therapeutic Activities (73760)       CC: Mid row  Shldr ext  [employer] add  IR/ER  High row  PNF  D1,D2   15#  10#  10#  5#  17.5#  7.5#    20  20  20  20 R  20  15 R ea    Shoulder flex / scap to 90      Wall push up   2x10                  Neuromuscular Re-ed (26597)       [place] over SB green  2x10    Shoulder BOSU   X 10 f/b, s/s, circles    Body blade: ER yellow  3x15\"                  Manual Intervention (44451)       R shldr PROM  10'     [address] mobs / gentle distraction      Manual STM to R delt  5'                              Pt. Education:  -pt educated on diagnosis, prognosis and expectations for rehab  -all pt questions were answered    Home Exercise Program:  11/8/22: sleeper stretch  9/9: shoulder flexion wall wash, tband mid row  8/8/22: The following exercises were performed and added to the pt's home program (educated on appropriate frequency, intensity and duration etc.):    Access Code: [email or web address]  URL: [email or web address]  Date: 08/12/2022  Prepared by: Mars Edouard     Exercises  Supine Shoulder Flexion with Dowel - 1 x daily - 7 x weekly - 3 sets - 10 reps  Standing Shoulder Abduction AAROM with Dowel - 1 x daily - 7 x weekly - 3 sets - 10 reps  Supine Shoulder External Rotation in 45 Degrees Abduction AAROM with Dowel - 1 x daily - 7 x weekly - 3 sets - 10 reps  Seated Upper Trapezius Stretch - 1 x daily - 7 x weekly - 3 sets - 10 reps    Therapeutic Exercise and NMR EXR  [x] (62233) Provided verbal/tactile cueing for activities related to strengthening, flexibility, endurance, ROM  for improvements in scapular, scapulothoracic and UE control with self care, reaching, carrying, lifting, house/yardwork, driving/computer work.    [] (86229) Provided verbal/tactile cueing for activities related to improving balance, coordination, kinesthetic sense, posture, motor skill, proprioception  to assist with  scapular, scapulothoracic and UE control with self care, reaching, carrying, lifting, house/yardwork, driving/computer work. Therapeutic Activities:    [] (00021 or 13299) Provided verbal/tactile cueing for activities related to improving balance, coordination, kinesthetic sense, posture, motor skill, proprioception and motor activation to allow for proper function of scapular, scapulothoracic and UE control with self care, carrying, lifting, driving/computer work.      Home Exercise Program:    [x] (78315) Reviewed/Progressed HEP activities related to strengthening, flexibility, endurance, ROM of scapular, scapulothoracic and UE control with self care, reaching, carrying, lifting, house/yardwork, driving/computer work  [] (72865) Reviewed/Progressed HEP activities related to improving balance, coordination, kinesthetic sense, posture, motor skill, proprioception of scapular, scapulothoracic and UE control with self care, reaching, carrying, lifting, house/yardwork, driving/computer work      Manual Treatments:  PROM / STM / Oscillations-Mobs:  G-I, II, III, IV (PA's, Inf., Post.)  [] (62814) Provided manual therapy to mobilize soft tissue/joints of cervical/CT, scapular GHJ and UE for the purpose of modulating pain, promoting relaxation,  increasing ROM, reducing/eliminating soft tissue swelling/inflammation/restriction, improving soft tissue extensibility and allowing for proper ROM for normal function with self care, reaching, carrying, lifting, house/yardwork, driving/computer work    Modalities: 12/21/22: IFC to R shldr x 10' - pt seated    Charges:  Timed Code Treatment Minutes: 55   Total Treatment Minutes: 55       [] EVAL - LOW (39443)   [] EVAL - MOD (53349)  [] EVAL - HIGH (05275)  [] RE-EVAL (10219)  [x] TE (10491) x 1     [] Ionto (90590)  [] NMR (84429) x      [] Vaso (24515)  [x] Manual (00127) x 1     [] Ultrasound  [x] TA (57906) x  1    [] Mech Traction (65324)  [] Gait Training x     [x] ES (un) (71490):   [] Aquatic therapy x   [] Other:   [] Group:     GOALS:  Therapist goals for Patient:  Short Term Goals: To be achieved in: 2 weeks  1. Independent in HEP and progression per patient tolerance, in order to prevent re-injury. [] Progressing: [x] Met: [] Not Met: [] Adjusted  2. Patient will have a decrease in pain to facilitate improvement in movement, function, and ADLs as indicated by Functional Deficits. [] Progressing: [x] Met: [] Not Met: [] Adjusted     Long Term Goals: To be achieved in: 8 weeks  1. Disability index score of 10% or less for the UEFI or Quick DASH to assist with reaching prior level of function. [x] Progressing: [] Met: [] Not Met: [] Adjusted  2. Patient will demonstrate increased AROM to 180 to allow for proper joint functioning as indicated by patients Functional Deficits. [x] Progressing: [] Met: [] Not Met: [] Adjusted  3. Patient will demonstrate an increase in Strength to 5/5 to allow for proper functional mobility as indicated by patients Functional Deficits. [] Progressing: [x] Met: [] Not Met: [] Adjusted  4. Patient will return to functional activities including lifting overhead without increased symptoms or restriction. [x] Progressing: [] Met: [] Not Met: [] Adjusted    Overall Progression Towards Functional goals/ Treatment Progress Update:  [x] Patient is progressing as expected towards functional goals listed. [] Progression is slowed due to complexities/Impairments listed. [] Progression has been slowed due to co-morbidities.   [] Plan just implemented, too soon to assess goals progression <30days   [] Goals require adjustment due to lack of progress  [] Patient is not progressing as expected and requires additional follow up with physician  [] Other    Persisting Functional Limitations/Impairments:  []Sitting []Standing   []Transfers  []Sleeping   [x]Reaching [x]Lifting   [x]ADLs [x]Housework  [x]Driving [x]Job related tasks  [x]Sports/Recreation []Other:    ASSESSMENT: Pt with increased pain coming into today but tolerated session well. Noted that he felt better after PROM, less painful and felt good to get his arm moving. Demonstrated good form with shoulder strengthening exercises. Treatment/Activity Tolerance:  [x] Patient tolerated treatment session well  [] Patient limited by fatique  [] Patient limited by pain  [] Patient limited by other medical complications  [] Other:     Prognosis:  [x] Good [] Fair  [] Poor    Patient Requires Follow-up: [x] Yes  [] No    Return to Play:    [x]  N/A     []  Stage 1: Intro to Strength   []  Stage 2: Dynamic Strength and Intro to Plyometrics   []  Stage 3: Advanced Plyometrics and Intro to Throwing   []  Stage 4: Sport specific Training/Return to Sport     []  Ready to Return to Play, [employer]   []  Not Ready for Return to Sports   Comments:      Plan for next treatment session:     PLAN: See eval. PT 2x / week for 8 weeks. [x] Continue per plan of care [] Alter current plan (see comments)  [] Plan of care initiated [] Hold pending MD visit [] Discharge    Electronically signed by: Da Trejo [id number]      Note: If patient does not return for scheduled/ recommended follow up visits, this note will serve as a discharge from care along with most recent update on progress.

## 2022-12-29 ENCOUNTER — OFFICE VISIT (OUTPATIENT)
Dept: ORTHOPEDIC SURGERY | Age: 47
End: 2022-12-29
Payer: COMMERCIAL

## 2022-12-29 VITALS — HEIGHT: 68 IN | BODY MASS INDEX: 39.86 KG/M2 | WEIGHT: 263 LBS

## 2022-12-29 DIAGNOSIS — M75.01 ADHESIVE CAPSULITIS OF RIGHT SHOULDER: Primary | ICD-10-CM

## 2022-12-29 PROCEDURE — 99213 OFFICE O/P EST LOW 20 MIN: CPT | Performed by: ORTHOPAEDIC SURGERY

## 2022-12-29 RX ORDER — ACETAMINOPHEN 500 MG
500 TABLET ORAL EVERY 6 HOURS PRN
COMMUNITY

## 2022-12-29 RX ORDER — IBUPROFEN 200 MG
TABLET ORAL EVERY 6 HOURS PRN
COMMUNITY

## 2022-12-29 NOTE — PROGRESS NOTES
CHIEF COMPLAINT: Right shoulder pain    History:    Karan Riley is a 50 y.o. right handed male here for right shoulder follow-up. Initial history: referred by Humble Lockhart MD  for Sports Medicine consultation for evaluation and treatment of Right shoulder pain. This is evaluated as a personal injury. The pain began 1 year ago. Pain is rated as a 5/10. There was not an injury. Dates he started having some pain after sleeping on a twin bed for 3 days while on vacation. Pain is located laterally and posteriorly. Symptoms are worse with overhead activity, reaching behind his back, laying on side. The patient has had PT at [facility] for diagnosis of adhesive capsulitis. The patient has not had an injection. The patient has tried NSAIDs, which he uses at baseline for his ankle. . The patient has not tried ice. Patient's occupation is     Interval History: He feels like he is doing better. However he complains of continued sharp pain at certain points. He has been doing physical therapy. They have noted improved range of motion. She rates pain 7/10.         Past Medical History:   Diagnosis Date    Asthma     SEASONAL    Bronchitis, acute     Disorder of skin and subcutaneous tissue     Hydrocephalus, congenital (HCC)     Hypertension     Need for prophylactic vaccination against Streptococcus pneumoniae (pneumococcus)     Need for prophylactic vaccination and inoculation against influenza     Need for prophylactic vaccination or inoculation against diphtheria and tetanus     Osteoarthrosis, unspecified whether generalized or localized, unspecified site     foot and ankle    Pharyngitis     PONV (postoperative nausea and vomiting)     Seizure ([address]     started as infant, then none x 30 yrs, none since 2009    Seizures ([address] 06/05/2018    LAST SEIZURE     Sinusitis, acute     Upper respiratory infection        Past Surgical History:   Procedure Laterality Date    FOOT SURGERY Right     gastrocnemius recession    HYDROCELE EXCISION Right 06/2017    OTHER SURGICAL HISTORY      URETHROPLASTY X 2    URETHRAL STRICTURE DILATATION      x2    VENTRICULOPERITONEAL SHUNT         Current Outpatient Medications on File Prior to Visit   Medication Sig Dispense Refill    ibuprofen (ADVIL;MOTRIN) 200 MG tablet Take by mouth every 6 hours as needed for Pain      acetaminophen (TYLENOL) 500 MG tablet Take 500 mg by mouth every 6 hours as needed for Pain      omeprazole (PRILOSEC) 40 MG delayed release capsule TAKE 1 CAPSULE DAILY 90 capsule 2    traZODone (DESYREL) 50 MG tablet TAKE 1 TO 2 TABLETS AT BEDTIME AS NEEDED FOR INSOMNIA 180 tablet 2    levETIRAcetam (KEPPRA) 1000 MG tablet TAKE 1 TABLET TWICE A  tablet 2    meloxicam (MOBIC) 15 MG tablet TAKE 1 TABLET DAILY 90 tablet 2    albuterol sulfate HFA (PROVENTIL;VENTOLIN;PROAIR) 108 (90 Base) MCG/ACT inhaler INHALE 2 PUFFS EVERY SIX HOURS AS NEEDED FOR WHEEZING 20.1 each 3    melatonin 3 MG TABS tablet Take 2 tablets by mouth nightly as needed (insomnia) Take 6 mg by mouth nightly as needed 180 tablet 3    cetirizine (ZYRTEC) 10 MG tablet Take 1 tablet by mouth in the morning. Take 10 mg by mouth dailyTake 10 mg by mouth daily. 90 tablet 3    lisinopril-hydroCHLOROthiazide (PRINZIDE;ZESTORETIC) 20-12.5 MG per tablet Take 1 tablet by mouth in the morning. 90 tablet 3    amLODIPine (NORVASC) 5 MG tablet TAKE 1 TABLET BY MOUTH EVERY DAY 90 tablet 3     No current facility-administered medications on file prior to visit.        Allergies   Allergen Reactions    Penicillins Rash    Phenobarbital Sodium Diarrhea and Nausea And Vomiting       Social History     Socioeconomic History    Marital status:      Spouse name: Not on file    Number of children: Not on file    Years of education: Not on file    Highest education level: Not on file   Occupational History    Occupation:      [employer]er: [employer]   Tobacco Use Smoking status: Former     Types: Cigarettes     Quit date: 5/10/1996     Years since quittin.6    Smokeless tobacco: Never   Vaping Use    Vaping Use: Never used   Substance and Sexual Activity    Alcohol use: Yes     Alcohol/week: 5.0 standard drinks     Types: 5 Cans of beer per week     Comment: 5 BEERS WEEKLY    Drug use: No    Sexual activity: Yes     Partners: Female   Other Topics Concern    Not on file   Social History Narrative    Not on file     Social Determinants of Health     Financial Resource Strain: Low Risk     Difficulty of Paying Living Expenses: Not hard at all   Food Insecurity: No Food Insecurity    Worried About [address] in the Last Year: Never true    [facility]  [employer] in the Last Year: Never true   Transportation Needs: No Transportation Needs    Lack of Transportation (Medical): No    Lack of Transportation (Non-Medical): No   Physical Activity: Inactive    Days of Exercise per Week: 0 days    Minutes of Exercise per Session: 0 min   Stress: No Stress Concern Present    Feeling of Stress : Only a little   Social Connections: Socially Integrated    Frequency of Communication with Friends and Family: More than three times a week    Frequency of Social Gatherings with Friends and Family:  Three times a week    Attends Latter-day Services: More than 4 times per year    Active Member of Clubs or Organizations: Yes    Attends Club or Organization Meetings: More than 4 times per year    Marital Status:    Intimate Partner Violence: Not At Risk    Fear of Current or Ex-Partner: No    Emotionally Abused: No    Physically Abused: No    Sexually Abused: No   Housing Stability: Low Risk     Unable to Pay for Housing in the Last Year: No    Number of Places Lived in the Last Year: 2    Unstable Housing in the Last Year: No       Family History   Problem Relation Age of Onset    Rheum Arthritis Mother     High Blood Pressure Mother     Cancer Maternal Grandmother         lung Other Maternal Grandfather         parkinsons disease    Heart Disease Father            Physical Examination:      Vital signs:  Ht 5' 8\" (1.727 m)   Wt 263 lb (119.3 kg)   BMI 39.99 kg/m²     General:   alert, appears stated age, cooperative, and no distress   Right Shoulder   Active ROM:   forward flexion 170, external rotation 80, internal rotation L4. Left shoulder: forward flexion 180, external rotation 80, internal rotation T10. Passive ROM:  forward flexion 180 though tight at the extreme range, abduction 90, ER with arm abducted 90, IR with arm abducted 90 though tight at the extreme range   Neer:  Positive   Benz:  Positive   Strength:   5/5 Supraspinatus, External rotation    Bilateral shoulders       Imaging   Right Shoulder X-Ray: 3 views obtained and reviewed  AC Joint: narrowing moderate  Glenohumeral joint: no abnormalities noted  Elevation humeral head: absent      Assessment:      Right shoulder adhesive capsulitis  Class II obesity  Congenital hydrocephalus  Hypertension      Plan:      Discussed that his range of motion has improved, and passively I can achieve full range of motion in all planes. There is no indication for surgical intervention at this time. Continue/finish physical therapy. Continue HEP. Ice/heat as needed. Continue NSAIDs as needed. Follow-up in 2 months. Mary Corado. Anthony Shahid MD  Orthopaedic Surgery and Sports Medicine     Disclaimer: This note was generated with use of a verbal recognition program and an attempt was made to check for errors. It is possible that there are still dictated errors within this office note. If so, please bring any significant errors to my attention for an addendum. All efforts were made to ensure that this office note is accurate.

## 2023-03-22 DIAGNOSIS — J45.909 MILD ASTHMA WITHOUT COMPLICATION, UNSPECIFIED WHETHER PERSISTENT: ICD-10-CM

## 2023-03-22 RX ORDER — ALBUTEROL SULFATE 90 UG/1
AEROSOL, METERED RESPIRATORY (INHALATION)
Qty: 20.1 EACH | Refills: 3 | Status: SHIPPED | OUTPATIENT
Start: 2023-03-22

## 2023-03-22 NOTE — TELEPHONE ENCOUNTER
Medication:   Requested Prescriptions     Pending Prescriptions Disp Refills    albuterol sulfate HFA (PROVENTIL;VENTOLIN;PROAIR) 108 (90 Base) MCG/ACT inhaler 20.1 each 3     Sig: INHALE 2 PUFFS EVERY SIX HOURS AS NEEDED FOR WHEEZING        Last Filled:  07/22/2022 #1 3rf    Patient Phone Number: [phone] (home) [phone] (work)    Last appt: 8/15/2022   Next appt: Visit date not found    Last OARRS: No flowsheet data found.

## 2023-07-24 ENCOUNTER — PATIENT MESSAGE (OUTPATIENT)
Dept: FAMILY MEDICINE CLINIC | Age: 48
End: 2023-07-24
Payer: COMMERCIAL

## 2023-07-24 DIAGNOSIS — N39.0 COMPLICATED UTI (URINARY TRACT INFECTION): Primary | ICD-10-CM

## 2023-07-24 PROCEDURE — 81002 URINALYSIS NONAUTO W/O SCOPE: CPT | Performed by: FAMILY MEDICINE

## 2023-07-24 RX ORDER — SULFAMETHOXAZOLE AND TRIMETHOPRIM 800; 160 MG/1; MG/1
1 TABLET ORAL 2 TIMES DAILY
Qty: 14 TABLET | Refills: 0 | Status: SHIPPED | OUTPATIENT
Start: 2023-07-24 | End: 2023-07-31

## 2023-07-24 RX ORDER — SULFAMETHOXAZOLE AND TRIMETHOPRIM 800; 160 MG/1; MG/1
1 TABLET ORAL 2 TIMES DAILY
Qty: 10 TABLET | Refills: 0 | Status: SHIPPED | OUTPATIENT
Start: 2023-07-24 | End: 2023-07-24

## 2023-07-24 NOTE — TELEPHONE ENCOUNTER
From: Roly Traore  To: Dr. Espinoza Gent: 7/24/2023 8:40 AM EDT  Subject: Possible UTI or bladder infection     Hi Dr. Ml Edouard    I have a history of UTI and or bladder infections due to prior problems and surgeries (urethral structure) I am having some of the common symptoms particularly with urgency and a slight discharge. Are you able to prescribe an antibiotic? I am Cipro resistant and allergic to penicillin. I have a physical scheduled for Friday but wondering if we could start an antibiotic course now based on medical hx? If so, could you send to [facility]?

## 2023-07-25 ENCOUNTER — NURSE ONLY (OUTPATIENT)
Dept: FAMILY MEDICINE CLINIC | Age: 48
End: 2023-07-25
Payer: COMMERCIAL

## 2023-07-25 DIAGNOSIS — N39.0 COMPLICATED UTI (URINARY TRACT INFECTION): ICD-10-CM

## 2023-07-25 DIAGNOSIS — R30.0 DYSURIA: Primary | ICD-10-CM

## 2023-07-25 LAB
BILIRUBIN, POC: NORMAL
BLOOD URINE, POC: NORMAL
CLARITY, POC: CLEAR
COLOR, POC: YELLOW
GLUCOSE URINE, POC: NORMAL
KETONES, POC: NORMAL
LEUKOCYTE EST, POC: NORMAL
NITRITE, POC: NORMAL
PH, POC: 6.5
PROTEIN, POC: NORMAL
SPECIFIC GRAVITY, POC: 1.01
UROBILINOGEN, POC: 0.2

## 2023-07-25 PROCEDURE — 81002 URINALYSIS NONAUTO W/O SCOPE: CPT | Performed by: FAMILY MEDICINE

## 2023-07-26 LAB — BACTERIA UR CULT: NORMAL

## 2023-07-28 ENCOUNTER — OFFICE VISIT (OUTPATIENT)
Dept: FAMILY MEDICINE CLINIC | Age: 48
End: 2023-07-28
Payer: COMMERCIAL

## 2023-07-28 VITALS
WEIGHT: 267 LBS | SYSTOLIC BLOOD PRESSURE: 126 MMHG | OXYGEN SATURATION: 98 % | HEIGHT: 68 IN | BODY MASS INDEX: 40.47 KG/M2 | HEART RATE: 76 BPM | TEMPERATURE: 98.1 F | DIASTOLIC BLOOD PRESSURE: 81 MMHG

## 2023-07-28 DIAGNOSIS — J45.909 MILD ASTHMA WITHOUT COMPLICATION, UNSPECIFIED WHETHER PERSISTENT: ICD-10-CM

## 2023-07-28 DIAGNOSIS — F51.01 PRIMARY INSOMNIA: ICD-10-CM

## 2023-07-28 DIAGNOSIS — Z00.00 HEALTHCARE MAINTENANCE: ICD-10-CM

## 2023-07-28 DIAGNOSIS — Z91.09 ENVIRONMENTAL ALLERGIES: ICD-10-CM

## 2023-07-28 DIAGNOSIS — Z00.00 HEALTHCARE MAINTENANCE: Primary | ICD-10-CM

## 2023-07-28 DIAGNOSIS — K21.9 GASTRO-ESOPHAGEAL REFLUX DISEASE WITHOUT ESOPHAGITIS: ICD-10-CM

## 2023-07-28 DIAGNOSIS — G40.209 PARTIAL EPILEPSY WITH IMPAIRMENT OF CONSCIOUSNESS (HCC): ICD-10-CM

## 2023-07-28 DIAGNOSIS — I10 ESSENTIAL HYPERTENSION: ICD-10-CM

## 2023-07-28 DIAGNOSIS — N39.0 COMPLICATED UTI (URINARY TRACT INFECTION): ICD-10-CM

## 2023-07-28 LAB
BASOPHILS # BLD: 0 K/UL (ref 0–0.2)
BASOPHILS NFR BLD: 0.5 %
DEPRECATED RDW RBC AUTO: 14.1 % (ref 12.4–15.4)
EOSINOPHIL # BLD: 0.3 K/UL (ref 0–0.6)
EOSINOPHIL NFR BLD: 6.9 %
HCT VFR BLD AUTO: 46.1 % (ref 40.5–52.5)
HGB BLD-MCNC: 15.5 G/DL (ref 13.5–17.5)
LYMPHOCYTES # BLD: 1 K/UL (ref 1–5.1)
LYMPHOCYTES NFR BLD: 20.7 %
MCH RBC QN AUTO: 29 PG (ref 26–34)
MCHC RBC AUTO-ENTMCNC: 33.7 G/DL (ref 31–36)
MCV RBC AUTO: 86.1 FL (ref 80–100)
MONOCYTES # BLD: 0.6 K/UL (ref 0–1.3)
MONOCYTES NFR BLD: 11.5 %
NEUTROPHILS # BLD: 3 K/UL (ref 1.7–7.7)
NEUTROPHILS NFR BLD: 60.4 %
PLATELET # BLD AUTO: 209 K/UL (ref 135–450)
PMV BLD AUTO: 8.2 FL (ref 5–10.5)
PSA SERPL DL<=0.01 NG/ML-MCNC: 0.52 NG/ML (ref 0–4)
RBC # BLD AUTO: 5.35 M/UL (ref 4.2–5.9)
WBC # BLD AUTO: 5 K/UL (ref 4–11)

## 2023-07-28 PROCEDURE — 99396 PREV VISIT EST AGE 40-64: CPT | Performed by: FAMILY MEDICINE

## 2023-07-28 PROCEDURE — 3074F SYST BP LT 130 MM HG: CPT | Performed by: FAMILY MEDICINE

## 2023-07-28 PROCEDURE — 3079F DIAST BP 80-89 MM HG: CPT | Performed by: FAMILY MEDICINE

## 2023-07-28 RX ORDER — LEVETIRACETAM 1000 MG/1
TABLET ORAL
Qty: 180 TABLET | Refills: 1 | Status: SHIPPED | OUTPATIENT
Start: 2023-07-28

## 2023-07-28 RX ORDER — AMLODIPINE BESYLATE 5 MG/1
TABLET ORAL
Qty: 90 TABLET | Refills: 1 | Status: SHIPPED | OUTPATIENT
Start: 2023-07-28

## 2023-07-28 RX ORDER — ALBUTEROL SULFATE 90 UG/1
AEROSOL, METERED RESPIRATORY (INHALATION)
Qty: 20.1 EACH | Refills: 3 | Status: SHIPPED | OUTPATIENT
Start: 2023-07-28

## 2023-07-28 RX ORDER — CETIRIZINE HYDROCHLORIDE 10 MG/1
10 TABLET ORAL DAILY
Qty: 90 TABLET | Refills: 3 | Status: CANCELLED | OUTPATIENT
Start: 2023-07-28

## 2023-07-28 RX ORDER — LISINOPRIL AND HYDROCHLOROTHIAZIDE 20; 12.5 MG/1; MG/1
1 TABLET ORAL DAILY
Qty: 90 TABLET | Refills: 1 | Status: SHIPPED | OUTPATIENT
Start: 2023-07-28

## 2023-07-28 RX ORDER — TRAZODONE HYDROCHLORIDE 100 MG/1
TABLET ORAL
Qty: 90 TABLET | Refills: 1 | Status: SHIPPED | OUTPATIENT
Start: 2023-07-28

## 2023-07-28 RX ORDER — OMEPRAZOLE 40 MG/1
CAPSULE, DELAYED RELEASE ORAL
Qty: 90 CAPSULE | Refills: 3 | Status: SHIPPED | OUTPATIENT
Start: 2023-07-28

## 2023-07-28 RX ORDER — OMEPRAZOLE 40 MG/1
CAPSULE, DELAYED RELEASE ORAL
Qty: 90 CAPSULE | Refills: 2 | Status: CANCELLED | OUTPATIENT
Start: 2023-07-28

## 2023-07-28 ASSESSMENT — PATIENT HEALTH QUESTIONNAIRE - PHQ9
SUM OF ALL RESPONSES TO PHQ QUESTIONS 1-9: 0
SUM OF ALL RESPONSES TO PHQ QUESTIONS 1-9: 0
2. FEELING DOWN, DEPRESSED OR HOPELESS: 0
SUM OF ALL RESPONSES TO PHQ QUESTIONS 1-9: 0
SUM OF ALL RESPONSES TO PHQ QUESTIONS 1-9: 0
1. LITTLE INTEREST OR PLEASURE IN DOING THINGS: 0
SUM OF ALL RESPONSES TO PHQ9 QUESTIONS 1 & 2: 0

## 2023-07-28 NOTE — PROGRESS NOTES
Osteoarthrosis, unspecified whether generalized or localized, unspecified site     foot and ankle    Pharyngitis     PONV (postoperative nausea and vomiting)     Seizure ([address])     started as infant, then none x 30 yrs, none since     Seizures ([address]) 2018    LAST SEIZURE     Sinusitis, acute     Upper respiratory infection      Past Surgical History:   Procedure Laterality Date    FOOT SURGERY Right     gastrocnemius recession    HYDROCELE EXCISION Right 2017    OTHER SURGICAL HISTORY      URETHROPLASTY X 2    URETHRAL STRICTURE DILATATION      x2    VENTRICULOPERITONEAL SHUNT       Family History   Problem Relation Age of Onset    Rheum Arthritis Mother     High Blood Pressure Mother     Cancer Maternal Grandmother         lung    Other Maternal Grandfather         parkinsons disease    Heart Disease Father      Social History     Socioeconomic History    Marital status:      Spouse name: Not on file    Number of children: Not on file    Years of education: Not on file    Highest education level: Not on file   Occupational History    Occupation:      Employer: [employer]   Tobacco Use    Smoking status: Former     Types: Cigarettes     Quit date: 5/10/1996     Years since quittin.2    Smokeless tobacco: Never   Vaping Use    Vaping Use: Never used   Substance and Sexual Activity    Alcohol use:  Yes     Alcohol/week: 5.0 standard drinks     Types: 5 Cans of beer per week     Comment: 5 BEERS WEEKLY    Drug use: No    Sexual activity: Yes     Partners: Female   Other Topics Concern    Not on file   Social History Narrative    Not on file     Social Determinants of Health     Financial Resource Strain: Not on file   Food Insecurity: Not on file   Transportation Needs: Not on file   Physical Activity: Unknown    Days of Exercise per Week: 0 days    Minutes of Exercise per Session: Not on file   Stress: Not on file   Social Connections: Not on file   Intimate Partner

## 2023-07-29 LAB
ALBUMIN SERPL-MCNC: 4.4 G/DL (ref 3.4–5)
ALBUMIN/GLOB SERPL: 1.8 {RATIO} (ref 1.1–2.2)
ALP SERPL-CCNC: 64 U/L (ref 40–129)
ALT SERPL-CCNC: 25 U/L (ref 10–40)
ANION GAP SERPL CALCULATED.3IONS-SCNC: 11 MMOL/L (ref 3–16)
AST SERPL-CCNC: 17 U/L (ref 15–37)
BILIRUB SERPL-MCNC: 0.6 MG/DL (ref 0–1)
BUN SERPL-MCNC: 17 MG/DL (ref 7–20)
CALCIUM SERPL-MCNC: 9.2 MG/DL (ref 8.3–10.6)
CHLORIDE SERPL-SCNC: 97 MMOL/L (ref 99–110)
CHOLEST SERPL-MCNC: 119 MG/DL (ref 0–199)
CO2 SERPL-SCNC: 25 MMOL/L (ref 21–32)
CREAT SERPL-MCNC: 1.2 MG/DL (ref 0.9–1.3)
EST. AVERAGE GLUCOSE BLD GHB EST-MCNC: 105.4 MG/DL
GFR SERPLBLD CREATININE-BSD FMLA CKD-EPI: >60 ML/MIN/{1.73_M2}
GLUCOSE SERPL-MCNC: 99 MG/DL (ref 70–99)
HBA1C MFR BLD: 5.3 %
HDLC SERPL-MCNC: 32 MG/DL (ref 40–60)
LDLC SERPL CALC-MCNC: 64 MG/DL
POTASSIUM SERPL-SCNC: 4.3 MMOL/L (ref 3.5–5.1)
PROT SERPL-MCNC: 6.9 G/DL (ref 6.4–8.2)
SODIUM SERPL-SCNC: 133 MMOL/L (ref 136–145)
TRIGL SERPL-MCNC: 117 MG/DL (ref 0–150)
VLDLC SERPL CALC-MCNC: 23 MG/DL

## 2023-10-31 DIAGNOSIS — K21.9 GASTRO-ESOPHAGEAL REFLUX DISEASE WITHOUT ESOPHAGITIS: ICD-10-CM

## 2023-10-31 DIAGNOSIS — I10 ESSENTIAL HYPERTENSION: ICD-10-CM

## 2023-10-31 DIAGNOSIS — M19.072 PRIMARY OSTEOARTHRITIS OF LEFT ANKLE: ICD-10-CM

## 2023-10-31 DIAGNOSIS — G40.209 PARTIAL EPILEPSY WITH IMPAIRMENT OF CONSCIOUSNESS (HCC): ICD-10-CM

## 2023-10-31 RX ORDER — OMEPRAZOLE 40 MG/1
CAPSULE, DELAYED RELEASE ORAL
Qty: 90 CAPSULE | Refills: 3 | Status: SHIPPED | OUTPATIENT
Start: 2023-10-31

## 2023-10-31 RX ORDER — MELOXICAM 15 MG/1
TABLET ORAL
Qty: 90 TABLET | Refills: 3 | Status: SHIPPED | OUTPATIENT
Start: 2023-10-31

## 2023-10-31 RX ORDER — LEVETIRACETAM 1000 MG/1
TABLET ORAL
Qty: 180 TABLET | Refills: 3 | Status: SHIPPED | OUTPATIENT
Start: 2023-10-31

## 2023-10-31 RX ORDER — AMLODIPINE BESYLATE 5 MG/1
TABLET ORAL
Qty: 90 TABLET | Refills: 3 | Status: SHIPPED | OUTPATIENT
Start: 2023-10-31

## 2023-10-31 RX ORDER — TRAZODONE HYDROCHLORIDE 50 MG/1
TABLET ORAL
Qty: 180 TABLET | Refills: 3 | Status: SHIPPED | OUTPATIENT
Start: 2023-10-31

## 2023-10-31 NOTE — TELEPHONE ENCOUNTER
Medication:   Requested Prescriptions     Pending Prescriptions Disp Refills    levETIRAcetam (KEPPRA) 1000 MG tablet [Pharmacy Med Name: LEVETIRACETAM TABS 1000MG] 180 tablet 3     Sig: TAKE 1 TABLET TWICE A DAY    traZODone (DESYREL) 50 MG tablet [Pharmacy Med Name: TRAZODONE HCL TABS 50MG] 180 tablet 3     Sig: TAKE 1 TO 2 TABLETS AT BEDTIME AS NEEDED FOR INSOMNIA    omeprazole (PRILOSEC) 40 MG delayed release capsule [Pharmacy Med Name: OMEPRAZOLE DR CAPS 40MG] 90 capsule 3     Sig: TAKE 1 CAPSULE DAILY    meloxicam (MOBIC) 15 MG tablet [Pharmacy Med Name: MELOXICAM TABS 15MG] 90 tablet 3     Sig: TAKE 1 TABLET DAILY        Last Filled:  07/28/2023 #90 1rf    Patient Phone Number: [phone] (home) [phone] (work)    Last appt: 7/28/2023   Next appt: 01/29/2024    Last OARRS:        No data to display

## 2023-10-31 NOTE — TELEPHONE ENCOUNTER
Medication:   Requested Prescriptions     Pending Prescriptions Disp Refills    amLODIPine (NORVASC) 5 MG tablet [Pharmacy Med Name: AMLODIPINE BESYLATE TABS 5MG] 90 tablet 3     Sig: TAKE 1 TABLET DAILY       Last Filled:  07/28/2023 #90 1rf    Patient Phone Number: [phone] (home) [phone] (work)    Last appt: 7/28/2023   Next appt: 1/29/2024    Lab Results   Component Value Date     (L) 07/28/2023    K 4.3 07/28/2023    CL 97 (L) 07/28/2023    CO2 25 07/28/2023    BUN 17 07/28/2023    CREATININE 1.2 07/28/2023    GLUCOSE 99 07/28/2023    CALCIUM 9.2 07/28/2023    PROT 6.9 07/28/2023    LABALBU 4.4 07/28/2023    BILITOT 0.6 07/28/2023    ALKPHOS 64 07/28/2023    AST 17 07/28/2023    ALT 25 07/28/2023    LABGLOM >60 07/28/2023    GFRAA >60 07/22/2022    AGRATIO 1.8 07/28/2023    GLOB 3.0 10/30/2020

## 2024-01-08 DIAGNOSIS — K21.9 GASTRO-ESOPHAGEAL REFLUX DISEASE WITHOUT ESOPHAGITIS: ICD-10-CM

## 2024-01-08 DIAGNOSIS — I10 ESSENTIAL HYPERTENSION: ICD-10-CM

## 2024-01-08 DIAGNOSIS — G40.209 PARTIAL EPILEPSY WITH IMPAIRMENT OF CONSCIOUSNESS (HCC): ICD-10-CM

## 2024-01-08 DIAGNOSIS — M19.072 PRIMARY OSTEOARTHRITIS OF LEFT ANKLE: ICD-10-CM

## 2024-01-08 RX ORDER — OMEPRAZOLE 40 MG/1
40 CAPSULE, DELAYED RELEASE ORAL DAILY
Qty: 90 CAPSULE | Refills: 3 | Status: SHIPPED | OUTPATIENT
Start: 2024-01-08 | End: 2024-01-08

## 2024-01-08 RX ORDER — AMLODIPINE BESYLATE 5 MG/1
TABLET ORAL
Qty: 90 TABLET | Refills: 3 | Status: SHIPPED | OUTPATIENT
Start: 2024-01-08

## 2024-01-08 RX ORDER — MELOXICAM 15 MG/1
15 TABLET ORAL DAILY
Qty: 90 TABLET | Refills: 3 | Status: SHIPPED | OUTPATIENT
Start: 2024-01-08

## 2024-01-08 RX ORDER — LISINOPRIL AND HYDROCHLOROTHIAZIDE 20; 12.5 MG/1; MG/1
1 TABLET ORAL DAILY
Qty: 90 TABLET | Refills: 1 | Status: SHIPPED | OUTPATIENT
Start: 2024-01-08

## 2024-01-08 RX ORDER — OMEPRAZOLE 20 MG/1
20 CAPSULE, DELAYED RELEASE ORAL
Qty: 180 CAPSULE | Refills: 1 | Status: SHIPPED | OUTPATIENT
Start: 2024-01-08

## 2024-01-08 RX ORDER — LEVETIRACETAM 1000 MG/1
1000 TABLET ORAL 2 TIMES DAILY
Qty: 180 TABLET | Refills: 3 | Status: SHIPPED | OUTPATIENT
Start: 2024-01-08

## 2024-01-08 RX ORDER — TRAZODONE HYDROCHLORIDE 50 MG/1
TABLET ORAL
Qty: 90 TABLET | Refills: 3 | Status: SHIPPED | OUTPATIENT
Start: 2024-01-08

## 2024-01-29 DIAGNOSIS — Z00.00 HEALTHCARE MAINTENANCE: Primary | ICD-10-CM

## 2024-02-12 ENCOUNTER — OFFICE VISIT (OUTPATIENT)
Dept: FAMILY MEDICINE CLINIC | Age: 49
End: 2024-02-12
Payer: COMMERCIAL

## 2024-02-12 VITALS
SYSTOLIC BLOOD PRESSURE: 122 MMHG | DIASTOLIC BLOOD PRESSURE: 82 MMHG | HEIGHT: 68 IN | HEART RATE: 75 BPM | WEIGHT: 274 LBS | BODY MASS INDEX: 41.52 KG/M2 | OXYGEN SATURATION: 99 %

## 2024-02-12 DIAGNOSIS — G40.209 PARTIAL EPILEPSY WITH IMPAIRMENT OF CONSCIOUSNESS (HCC): ICD-10-CM

## 2024-02-12 DIAGNOSIS — Z00.00 HEALTHCARE MAINTENANCE: ICD-10-CM

## 2024-02-12 DIAGNOSIS — K21.9 GASTROESOPHAGEAL REFLUX DISEASE WITHOUT ESOPHAGITIS: ICD-10-CM

## 2024-02-12 DIAGNOSIS — Q03.9 CONGENITAL HYDROCEPHALUS (HCC): ICD-10-CM

## 2024-02-12 DIAGNOSIS — I10 ESSENTIAL HYPERTENSION: Primary | ICD-10-CM

## 2024-02-12 DIAGNOSIS — E66.01 CLASS 3 SEVERE OBESITY DUE TO EXCESS CALORIES WITH SERIOUS COMORBIDITY AND BODY MASS INDEX (BMI) OF 40.0 TO 44.9 IN ADULT (HCC): ICD-10-CM

## 2024-02-12 LAB
ANION GAP SERPL CALCULATED.3IONS-SCNC: 9 MMOL/L (ref 3–16)
BUN SERPL-MCNC: 17 MG/DL (ref 7–20)
CALCIUM SERPL-MCNC: 8.9 MG/DL (ref 8.3–10.6)
CHLORIDE SERPL-SCNC: 102 MMOL/L (ref 99–110)
CO2 SERPL-SCNC: 27 MMOL/L (ref 21–32)
CREAT SERPL-MCNC: 0.9 MG/DL (ref 0.9–1.3)
GFR SERPLBLD CREATININE-BSD FMLA CKD-EPI: >60 ML/MIN/{1.73_M2}
GLUCOSE SERPL-MCNC: 97 MG/DL (ref 70–99)
HBV SURFACE AB SERPL IA-ACNC: <3.5 MIU/ML
HBV SURFACE AG SERPL QL IA: NORMAL
POTASSIUM SERPL-SCNC: 4 MMOL/L (ref 3.5–5.1)
SODIUM SERPL-SCNC: 138 MMOL/L (ref 136–145)

## 2024-02-12 PROCEDURE — 3079F DIAST BP 80-89 MM HG: CPT | Performed by: FAMILY MEDICINE

## 2024-02-12 PROCEDURE — 90471 IMMUNIZATION ADMIN: CPT | Performed by: FAMILY MEDICINE

## 2024-02-12 PROCEDURE — 99214 OFFICE O/P EST MOD 30 MIN: CPT | Performed by: FAMILY MEDICINE

## 2024-02-12 PROCEDURE — 90750 HZV VACC RECOMBINANT IM: CPT | Performed by: FAMILY MEDICINE

## 2024-02-12 PROCEDURE — 3074F SYST BP LT 130 MM HG: CPT | Performed by: FAMILY MEDICINE

## 2024-02-12 NOTE — PROGRESS NOTES
Kain Ray   YOB: 1975    Date of Visit:  2/12/2024    Allergies   Allergen Reactions    Penicillins Rash    Phenobarbital Sodium Diarrhea and Nausea And Vomiting     Outpatient Medications Marked as Taking for the 2/12/24 encounter (Office Visit) with Tina Lemons MD   Medication Sig Dispense Refill    amLODIPine (NORVASC) 5 MG tablet TAKE 1 TABLET DAILY 90 tablet 3    levETIRAcetam (KEPPRA) 1000 MG tablet Take 1 tablet by mouth 2 times daily 180 tablet 3    lisinopril-hydroCHLOROthiazide (PRINZIDE;ZESTORETIC) 20-12.5 MG per tablet Take 1 tablet by mouth daily 90 tablet 1    meloxicam (MOBIC) 15 MG tablet Take 1 tablet by mouth daily 90 tablet 3    traZODone (DESYREL) 50 MG tablet TAKE 1 TABLET AT BEDTIME AS NEEDED FOR INSOMNIA 90 tablet 3    omeprazole (PRILOSEC) 20 MG delayed release capsule Take 1 capsule by mouth 2 times daily (before meals) 180 capsule 1    albuterol sulfate HFA (PROVENTIL;VENTOLIN;PROAIR) 108 (90 Base) MCG/ACT inhaler INHALE 2 PUFFS EVERY SIX HOURS AS NEEDED FOR WHEEZING 20.1 each 3    acetaminophen (TYLENOL) 500 MG tablet Take 1 tablet by mouth every 6 hours as needed for Pain      melatonin 3 MG TABS tablet Take 2 tablets by mouth nightly as needed (insomnia) Take 6 mg by mouth nightly as needed 180 tablet 3    cetirizine (ZYRTEC) 10 MG tablet Take 1 tablet by mouth in the morning. Take 10 mg by mouth dailyTake 10 mg by mouth daily. 90 tablet 3         Vitals:    02/12/24 0733   BP: 122/82   Pulse: 75   SpO2: 99%   Weight: 124.3 kg (274 lb)   Height: 1.727 m (5' 8\")     Body mass index is 41.66 kg/m².     Wt Readings from Last 3 Encounters:   02/12/24 124.3 kg (274 lb)   07/28/23 121.1 kg (267 lb)   12/29/22 119.3 kg (263 lb)     BP Readings from Last 3 Encounters:   02/12/24 122/82   07/28/23 126/81   07/22/22 122/74        Chief Complaint   Patient presents with    Medication Refill           Assessment/Plan     Kain was seen today for medication

## 2024-04-29 DIAGNOSIS — I10 ESSENTIAL HYPERTENSION: ICD-10-CM

## 2024-04-29 RX ORDER — LISINOPRIL AND HYDROCHLOROTHIAZIDE 20; 12.5 MG/1; MG/1
1 TABLET ORAL DAILY
Qty: 90 TABLET | Refills: 3 | Status: SHIPPED | OUTPATIENT
Start: 2024-04-29

## 2024-04-29 NOTE — TELEPHONE ENCOUNTER
Medication:   Requested Prescriptions     Pending Prescriptions Disp Refills    lisinopril-hydroCHLOROthiazide (PRINZIDE;ZESTORETIC) 20-12.5 MG per tablet [Pharmacy Med Name: LISINOPRIL/HCTZ TABS 20/12.5MG] 90 tablet 3     Sig: TAKE 1 TABLET DAILY       Last Filled:  01/08/2024 #90 1rf    Patient Phone Number: [phone] (home) [phone] (work)    Last appt: 2/12/2024   Next appt: 8/12/2024    Lab Results   Component Value Date     02/12/2024    K 4.0 02/12/2024     02/12/2024    CO2 27 02/12/2024    BUN 17 02/12/2024    CREATININE 0.9 02/12/2024    GLUCOSE 97 02/12/2024    CALCIUM 8.9 02/12/2024    PROT 6.9 07/28/2023    BILITOT 0.6 07/28/2023    ALKPHOS 64 07/28/2023    AST 17 07/28/2023    ALT 25 07/28/2023    LABGLOM >60 02/12/2024    GFRAA >60 07/22/2022    AGRATIO 1.8 07/28/2023    GLOB 3.0 10/30/2020

## 2024-08-26 DIAGNOSIS — J45.909 MILD ASTHMA WITHOUT COMPLICATION, UNSPECIFIED WHETHER PERSISTENT: ICD-10-CM

## 2024-08-26 NOTE — TELEPHONE ENCOUNTER
Medication:   Requested Prescriptions     Pending Prescriptions Disp Refills    albuterol sulfate HFA (PROVENTIL;VENTOLIN;PROAIR) 108 (90 Base) MCG/ACT inhaler [Pharmacy Med Name: ALBUTEROL(PA)HFA INH 8.5GM 90MCG] 17 g 4     Sig: USE 2 INHALATIONS EVERY 6 HOURS AS NEEDED FOR WHEEZING        Last Filled:  07/28/2023 #1 3rf    Patient Phone Number: [phone] (home) [phone] (work)    Last appt: 2/12/2024   Next appt: 9/9/2024    Last OARRS:        No data to display

## 2024-08-27 RX ORDER — ALBUTEROL SULFATE 90 UG/1
AEROSOL, METERED RESPIRATORY (INHALATION)
Qty: 17 G | Refills: 4 | Status: SHIPPED | OUTPATIENT
Start: 2024-08-27

## 2024-10-08 DIAGNOSIS — I10 ESSENTIAL HYPERTENSION: ICD-10-CM

## 2024-10-08 RX ORDER — AMLODIPINE BESYLATE 5 MG/1
TABLET ORAL
Qty: 90 TABLET | Refills: 0 | Status: SHIPPED | OUTPATIENT
Start: 2024-10-08

## 2024-10-08 NOTE — TELEPHONE ENCOUNTER
Medication:   Requested Prescriptions     Pending Prescriptions Disp Refills    amLODIPine (NORVASC) 5 MG tablet [Pharmacy Med Name: AMLODIPINE BESYLATE TABS 5MG] 90 tablet 3     Sig: TAKE 1 TABLET DAILY       Last Filled:  01/08/2024 #90 3rf     Patient Phone Number: [phone] (home) [phone] (work)    Last appt: 2/12/2024   Next appt: 10/17/2024    Last Labs DM:   Lab Results   Component Value Date/Time    LABA1C 5.3 07/28/2023 08:38 AM     Last Lipid:   Lab Results   Component Value Date/Time    CHOL 119 07/28/2023 08:38 AM    TRIG 117 07/28/2023 08:38 AM    HDL 32 07/28/2023 08:38 AM     Last PSA:   Lab Results   Component Value Date/Time    PSA 0.52 07/28/2023 08:38 AM     Last Thyroid: No results found for: \"TSH\", \"FT3\", \"K3KJDBW\", \"T4FREE\"

## 2024-10-16 RX ORDER — OMEPRAZOLE 40 MG/1
CAPSULE, DELAYED RELEASE ORAL
COMMUNITY
Start: 2024-09-20 | End: 2024-10-17

## 2024-10-17 ENCOUNTER — OFFICE VISIT (OUTPATIENT)
Dept: FAMILY MEDICINE CLINIC | Age: 49
End: 2024-10-17

## 2024-10-17 VITALS
OXYGEN SATURATION: 98 % | SYSTOLIC BLOOD PRESSURE: 124 MMHG | HEART RATE: 67 BPM | WEIGHT: 268 LBS | HEIGHT: 68 IN | DIASTOLIC BLOOD PRESSURE: 78 MMHG | BODY MASS INDEX: 40.62 KG/M2

## 2024-10-17 DIAGNOSIS — G40.209 PARTIAL EPILEPSY WITH IMPAIRMENT OF CONSCIOUSNESS (HCC): ICD-10-CM

## 2024-10-17 DIAGNOSIS — I10 ESSENTIAL HYPERTENSION: ICD-10-CM

## 2024-10-17 DIAGNOSIS — Z00.00 HEALTHCARE MAINTENANCE: Primary | ICD-10-CM

## 2024-10-17 DIAGNOSIS — M19.072 PRIMARY OSTEOARTHRITIS OF LEFT ANKLE: ICD-10-CM

## 2024-10-17 DIAGNOSIS — G47.00 INSOMNIA, UNSPECIFIED TYPE: ICD-10-CM

## 2024-10-17 DIAGNOSIS — K21.9 GASTROESOPHAGEAL REFLUX DISEASE WITHOUT ESOPHAGITIS: ICD-10-CM

## 2024-10-17 DIAGNOSIS — J45.909 MILD ASTHMA WITHOUT COMPLICATION, UNSPECIFIED WHETHER PERSISTENT: ICD-10-CM

## 2024-10-17 RX ORDER — ALBUTEROL SULFATE 90 UG/1
INHALANT RESPIRATORY (INHALATION)
Qty: 17 G | Refills: 4 | Status: SHIPPED | OUTPATIENT
Start: 2024-10-17

## 2024-10-17 RX ORDER — MELOXICAM 15 MG/1
15 TABLET ORAL DAILY
Qty: 90 TABLET | Refills: 3 | Status: SHIPPED | OUTPATIENT
Start: 2024-10-17

## 2024-10-17 RX ORDER — AMLODIPINE BESYLATE 5 MG/1
TABLET ORAL
Qty: 90 TABLET | Refills: 3 | Status: SHIPPED | OUTPATIENT
Start: 2024-10-17

## 2024-10-17 RX ORDER — LEVETIRACETAM 1000 MG/1
1000 TABLET ORAL 2 TIMES DAILY
Qty: 180 TABLET | Refills: 3 | Status: SHIPPED | OUTPATIENT
Start: 2024-10-17

## 2024-10-17 RX ORDER — TRAZODONE HYDROCHLORIDE 100 MG/1
TABLET ORAL
Qty: 90 TABLET | Refills: 3 | Status: SHIPPED | OUTPATIENT
Start: 2024-10-17

## 2024-10-17 RX ORDER — LISINOPRIL AND HYDROCHLOROTHIAZIDE 12.5; 2 MG/1; MG/1
1 TABLET ORAL DAILY
Qty: 90 TABLET | Refills: 3 | Status: SHIPPED | OUTPATIENT
Start: 2024-10-17

## 2024-10-17 NOTE — PROGRESS NOTES
Normocephalic and atraumatic.   Right Ear: Tympanic membrane, external ear and ear canal normal.   Left Ear: Tympanic membrane, external ear and ear canal normal.   Nose: Nose normal.   Mouth/Throat: Oropharynx is clear and moist and mucous membranes are normal. No oropharyngeal exudate or posterior oropharyngeal erythema. He has no cervical adenopathy.   Eyes: Conjunctivae and extraocular motions are normal. Pupils are equal, round, and reactive to light.   Neck: Full passive range of motion without pain. Neck supple. No mass and no thyromegaly present.   Cardiovascular: Normal rate, regular rhythm, normal heart sounds. No murmur heard.  Pulmonary/Chest: Effort normal and breath sounds normal. No respiratory distress. He has no wheezes, rhonchi or rales.   Abdominal: Soft, non-tender. Bowel sounds and aorta are normal. There is no noticeable organomegaly, mass or bruit.   Musculoskeletal: He exhibits no edema.   Neurological: He is alert and oriented to person, place, and time.  No cranial nerve deficit. Coordination normal.   Skin: Skin is warm, dry and intact.    Psychiatric: He has a normal mood and affect. His speech is normal and behavior is normal. Judgment, cognition and memory are normal.           Documentation was done using voice recognition dragon software. Efforts were made to ensure accuracy; however, inadvertent, unintentional computerized transcription errors may be present.     --Tina Lemons MD on 10/17/2024    An electronic signature was used to authenticate this note.

## 2024-11-11 ENCOUNTER — OFFICE VISIT (OUTPATIENT)
Dept: FAMILY MEDICINE CLINIC | Age: 49
End: 2024-11-11

## 2024-11-11 VITALS
HEIGHT: 68 IN | HEART RATE: 73 BPM | WEIGHT: 268.8 LBS | DIASTOLIC BLOOD PRESSURE: 84 MMHG | OXYGEN SATURATION: 96 % | TEMPERATURE: 97.5 F | SYSTOLIC BLOOD PRESSURE: 130 MMHG | BODY MASS INDEX: 40.74 KG/M2

## 2024-11-11 DIAGNOSIS — H00.012 HORDEOLUM EXTERNUM OF RIGHT LOWER EYELID: Primary | ICD-10-CM

## 2024-11-11 RX ORDER — ERYTHROMYCIN 5 MG/G
OINTMENT OPHTHALMIC
Qty: 3.5 G | Refills: 0 | Status: SHIPPED | OUTPATIENT
Start: 2024-11-11 | End: 2024-11-21

## 2024-11-11 NOTE — PROGRESS NOTES
Kain RIMA Ray   YOB: 1975    Date of Visit:  11/11/2024    Allergies   Allergen Reactions    Penicillins Rash    Phenobarbital Sodium Diarrhea and Nausea And Vomiting    Phenobarbital Sodium Diarrhea and Nausea And Vomiting     Outpatient Medications Marked as Taking for the 11/11/24 encounter (Office Visit) with Tina Lemons MD   Medication Sig Dispense Refill    erythromycin (ROMYCIN) 5 MG/GM ophthalmic ointment Apply topically to lower R eyelid BID for 1 week. 3.5 g 0    albuterol sulfate HFA (PROVENTIL;VENTOLIN;PROAIR) 108 (90 Base) MCG/ACT inhaler USE 2 INHALATIONS EVERY 6 HOURS AS NEEDED FOR WHEEZING 17 g 4    amLODIPine (NORVASC) 5 MG tablet TAKE 1 TABLET DAILY 90 tablet 3    levETIRAcetam (KEPPRA) 1000 MG tablet Take 1 tablet by mouth 2 times daily 180 tablet 3    lisinopril-hydroCHLOROthiazide (PRINZIDE;ZESTORETIC) 20-12.5 MG per tablet Take 1 tablet by mouth daily 90 tablet 3    meloxicam (MOBIC) 15 MG tablet Take 1 tablet by mouth daily 90 tablet 3    traZODone (DESYREL) 100 MG tablet TAKE 1 TABLET AT BEDTIME AS NEEDED FOR INSOMNIA 90 tablet 3    omeprazole (PRILOSEC) 20 MG delayed release capsule Take 1 capsule by mouth 2 times daily (before meals) 180 capsule 3    acetaminophen (TYLENOL) 500 MG tablet Take 1 tablet by mouth every 6 hours as needed for Pain      cetirizine (ZYRTEC) 10 MG tablet Take 1 tablet by mouth in the morning. Take 10 mg by mouth dailyTake 10 mg by mouth daily. 90 tablet 3         Vitals:    11/11/24 1049   BP: 130/84   Pulse: 73   Temp: 97.5 °F (36.4 °C)   TempSrc: Temporal   SpO2: 96%   Weight: 121.9 kg (268 lb 12.8 oz)   Height: 1.727 m (5' 8\")     Body mass index is 40.87 kg/m².     Wt Readings from Last 3 Encounters:   11/11/24 121.9 kg (268 lb 12.8 oz)   10/17/24 121.6 kg (268 lb)   02/12/24 124.3 kg (274 lb)     BP Readings from Last 3 Encounters:   11/11/24 130/84   10/17/24 124/78   02/12/24 122/82        Chief Complaint   Patient presents with

## 2024-12-10 DIAGNOSIS — E83.19 IRON EXCESS: Primary | ICD-10-CM

## 2024-12-23 DIAGNOSIS — G40.209 PARTIAL EPILEPSY WITH IMPAIRMENT OF CONSCIOUSNESS (HCC): ICD-10-CM

## 2024-12-23 DIAGNOSIS — M19.072 PRIMARY OSTEOARTHRITIS OF LEFT ANKLE: ICD-10-CM

## 2024-12-23 RX ORDER — LEVETIRACETAM 1000 MG/1
1000 TABLET ORAL 2 TIMES DAILY
Qty: 180 TABLET | Refills: 3 | Status: SHIPPED | OUTPATIENT
Start: 2024-12-23

## 2024-12-23 RX ORDER — MELOXICAM 15 MG/1
15 TABLET ORAL DAILY
Qty: 90 TABLET | Refills: 3 | Status: SHIPPED | OUTPATIENT
Start: 2024-12-23

## 2025-02-26 ENCOUNTER — NURSE ONLY (OUTPATIENT)
Dept: FAMILY MEDICINE CLINIC | Age: 50
End: 2025-02-26
Payer: COMMERCIAL

## 2025-02-26 DIAGNOSIS — Z23 ENCOUNTER FOR ADMINISTRATION OF VACCINE: Primary | ICD-10-CM

## 2025-02-26 PROCEDURE — 90746 HEPB VACCINE 3 DOSE ADULT IM: CPT | Performed by: FAMILY MEDICINE

## 2025-02-26 PROCEDURE — 90471 IMMUNIZATION ADMIN: CPT | Performed by: FAMILY MEDICINE

## 2025-02-26 SDOH — ECONOMIC STABILITY: FOOD INSECURITY: WITHIN THE PAST 12 MONTHS, YOU WORRIED THAT YOUR FOOD WOULD RUN OUT BEFORE YOU GOT MONEY TO BUY MORE.: NEVER TRUE

## 2025-02-26 SDOH — ECONOMIC STABILITY: FOOD INSECURITY: WITHIN THE PAST 12 MONTHS, THE FOOD YOU BOUGHT JUST DIDN'T LAST AND YOU DIDN'T HAVE MONEY TO GET MORE.: NEVER TRUE

## 2025-02-26 ASSESSMENT — PATIENT HEALTH QUESTIONNAIRE - PHQ9
SUM OF ALL RESPONSES TO PHQ QUESTIONS 1-9: 0
SUM OF ALL RESPONSES TO PHQ9 QUESTIONS 1 & 2: 0
2. FEELING DOWN, DEPRESSED OR HOPELESS: NOT AT ALL
SUM OF ALL RESPONSES TO PHQ QUESTIONS 1-9: 0
1. LITTLE INTEREST OR PLEASURE IN DOING THINGS: NOT AT ALL

## 2025-07-17 ENCOUNTER — PATIENT MESSAGE (OUTPATIENT)
Dept: FAMILY MEDICINE CLINIC | Age: 50
End: 2025-07-17

## 2025-07-17 DIAGNOSIS — Z12.5 SCREENING FOR PROSTATE CANCER: Primary | ICD-10-CM

## 2025-07-25 ENCOUNTER — OFFICE VISIT (OUTPATIENT)
Dept: FAMILY MEDICINE CLINIC | Age: 50
End: 2025-07-25

## 2025-07-25 VITALS
DIASTOLIC BLOOD PRESSURE: 86 MMHG | HEART RATE: 84 BPM | OXYGEN SATURATION: 97 % | HEIGHT: 68 IN | BODY MASS INDEX: 39.4 KG/M2 | WEIGHT: 260 LBS | SYSTOLIC BLOOD PRESSURE: 134 MMHG

## 2025-07-25 DIAGNOSIS — G47.00 INSOMNIA, UNSPECIFIED TYPE: ICD-10-CM

## 2025-07-25 DIAGNOSIS — Q03.9 CONGENITAL HYDROCEPHALUS (HCC): ICD-10-CM

## 2025-07-25 DIAGNOSIS — J45.909 MILD ASTHMA WITHOUT COMPLICATION, UNSPECIFIED WHETHER PERSISTENT: ICD-10-CM

## 2025-07-25 DIAGNOSIS — R73.9 ELEVATED BLOOD SUGAR: ICD-10-CM

## 2025-07-25 DIAGNOSIS — Z00.00 HEALTHCARE MAINTENANCE: Primary | ICD-10-CM

## 2025-07-25 DIAGNOSIS — I10 ESSENTIAL HYPERTENSION: ICD-10-CM

## 2025-07-25 DIAGNOSIS — G40.209 PARTIAL EPILEPSY WITH IMPAIRMENT OF CONSCIOUSNESS (HCC): ICD-10-CM

## 2025-07-25 DIAGNOSIS — Z12.5 SCREENING PSA (PROSTATE SPECIFIC ANTIGEN): ICD-10-CM

## 2025-07-25 RX ORDER — TRAZODONE HYDROCHLORIDE 100 MG/1
TABLET ORAL
Qty: 90 TABLET | Refills: 3 | Status: SHIPPED | OUTPATIENT
Start: 2025-07-25

## 2025-07-25 NOTE — PROGRESS NOTES
History and Physical      Kain Ray  YOB: 1975    Date of Service:  7/25/2025    Chief Complaint:   Kain Ray is 50 y.o. male who presents for complete physical examination.    Chief Complaint   Patient presents with    Hypertension       Assessment/Plan:    Kain was seen today for hypertension.    Diagnoses and all orders for this visit:    Healthcare maintenance    Insomnia, unspecified type  -     traZODone (DESYREL) 100 MG tablet; TAKE 1 TABLET AT BEDTIME AS NEEDED FOR INSOMNIA    Mild asthma without complication, unspecified whether persistent    Essential hypertension  -     Basic Metabolic Panel; Future    Congenital hydrocephalus (HCC)    Partial epilepsy with impairment of consciousness (HCC)    Screening PSA (prostate specific antigen)  -     PSA Screening; Future    Elevated blood sugar  -     Hemoglobin A1C; Future    Other orders  -     Hep B, ENGERIX-B, (age 20 yrs+), IM, 1mL, 3-dose  -     Pneumococcal, PCV20, PREVNAR 20, (age 6w+), IM, PF        Assessment & Plan  Health maintenance:  - Counseled on preventative care measures  - Due for a colonoscopy next year  - Will receive Prevnar 20 and third hepatitis B vaccines today  - PSA test to be conducted during next lab visit in about 6 months    Hypertension:  - Hypertension is stable  - Continue current medications: Prinzide (lisinopril/hydrochlorothiazide) and Norvasc (amlodipine)    Gastroesophageal reflux disease (GERD):  - GERD is stable on omeprazole 20 mg twice a day  - Experiences symptoms if dose is reduced    Insomnia:  - Insomnia is stable on trazodone 100 mg at bedtime  - Prescription for trazodone 100 mg will be sent to pharmacy    Congenital hydrocephalus:  - Condition is stable  - Annual consultations with neurosurgery    Seizure disorder:  - Seizure disorder is stable on Keppra  - Continue taking Keppra    Ankle pain:  - Experiences pain and numbness in ankle  - Managed well with meloxicam - has paint

## 2025-08-05 DIAGNOSIS — I10 ESSENTIAL HYPERTENSION: ICD-10-CM

## 2025-08-05 DIAGNOSIS — K21.9 GASTROESOPHAGEAL REFLUX DISEASE WITHOUT ESOPHAGITIS: ICD-10-CM

## 2025-08-05 RX ORDER — OMEPRAZOLE 20 MG/1
20 CAPSULE, DELAYED RELEASE ORAL
Qty: 180 CAPSULE | Refills: 3 | Status: SHIPPED | OUTPATIENT
Start: 2025-08-05

## 2025-08-05 RX ORDER — AMLODIPINE BESYLATE 5 MG/1
TABLET ORAL
Qty: 90 TABLET | Refills: 3 | Status: SHIPPED | OUTPATIENT
Start: 2025-08-05

## 2025-08-20 DIAGNOSIS — J45.909 MILD ASTHMA WITHOUT COMPLICATION, UNSPECIFIED WHETHER PERSISTENT: ICD-10-CM

## 2025-08-20 RX ORDER — ALBUTEROL SULFATE 90 UG/1
INHALANT RESPIRATORY (INHALATION)
Qty: 3 EACH | Refills: 1 | Status: SHIPPED | OUTPATIENT
Start: 2025-08-20